# Patient Record
Sex: MALE | Race: WHITE | Employment: FULL TIME | URBAN - METROPOLITAN AREA
[De-identification: names, ages, dates, MRNs, and addresses within clinical notes are randomized per-mention and may not be internally consistent; named-entity substitution may affect disease eponyms.]

---

## 2017-06-26 ENCOUNTER — OFFICE VISIT (OUTPATIENT)
Dept: INTERNAL MEDICINE | Age: 37
End: 2017-06-26

## 2017-06-26 VITALS
WEIGHT: 237 LBS | DIASTOLIC BLOOD PRESSURE: 82 MMHG | SYSTOLIC BLOOD PRESSURE: 120 MMHG | RESPIRATION RATE: 16 BRPM | HEART RATE: 88 BPM | BODY MASS INDEX: 30.42 KG/M2 | HEIGHT: 74 IN

## 2017-06-26 DIAGNOSIS — S76.211D GROIN STRAIN, RIGHT, SUBSEQUENT ENCOUNTER: Primary | ICD-10-CM

## 2017-06-26 PROCEDURE — 99213 OFFICE O/P EST LOW 20 MIN: CPT | Performed by: PHYSICIAN ASSISTANT

## 2017-06-26 RX ORDER — CYCLOBENZAPRINE HCL 10 MG
10 TABLET ORAL 3 TIMES DAILY PRN
Qty: 30 TABLET | Refills: 0 | Status: SHIPPED | OUTPATIENT
Start: 2017-06-26 | End: 2017-07-06

## 2017-06-26 RX ORDER — NAPROXEN 500 MG/1
TABLET ORAL
Refills: 0 | COMMUNITY
Start: 2017-06-19 | End: 2018-05-10

## 2017-11-09 ENCOUNTER — HOSPITAL ENCOUNTER (OUTPATIENT)
Age: 37
Setting detail: SPECIMEN
Discharge: HOME | End: 2017-11-09
Payer: COMMERCIAL

## 2017-11-09 ENCOUNTER — OFFICE VISIT (OUTPATIENT)
Dept: INTERNAL MEDICINE | Age: 37
End: 2017-11-09

## 2017-11-09 VITALS
HEIGHT: 74 IN | HEART RATE: 78 BPM | WEIGHT: 223.6 LBS | DIASTOLIC BLOOD PRESSURE: 84 MMHG | BODY MASS INDEX: 28.7 KG/M2 | SYSTOLIC BLOOD PRESSURE: 136 MMHG

## 2017-11-09 DIAGNOSIS — Z00.00 ANNUAL PHYSICAL EXAM: ICD-10-CM

## 2017-11-09 DIAGNOSIS — Z00.00 ANNUAL PHYSICAL EXAM: Primary | ICD-10-CM

## 2017-11-09 DIAGNOSIS — K21.9 GASTROESOPHAGEAL REFLUX DISEASE WITHOUT ESOPHAGITIS: ICD-10-CM

## 2017-11-09 LAB
ALBUMIN SERPL-MCNC: 4.5 G/DL (ref 3.9–4.9)
ALP BLD-CCNC: 89 U/L (ref 35–104)
ALT SERPL-CCNC: 24 U/L (ref 0–41)
ANION GAP SERPL CALCULATED.3IONS-SCNC: 18 MEQ/L (ref 7–13)
ANISOCYTOSIS: ABNORMAL
AST SERPL-CCNC: 34 U/L (ref 0–40)
BASOPHILS ABSOLUTE: 0 K/UL (ref 0–0.2)
BASOPHILS RELATIVE PERCENT: 0.4 %
BILIRUB SERPL-MCNC: 0.4 MG/DL (ref 0–1.2)
BUN BLDV-MCNC: 9 MG/DL (ref 6–20)
BURR CELLS: ABNORMAL
CALCIUM SERPL-MCNC: 10 MG/DL (ref 8.6–10.2)
CHLORIDE BLD-SCNC: 99 MEQ/L (ref 98–107)
CHOLESTEROL, TOTAL: 157 MG/DL (ref 0–199)
CO2: 24 MEQ/L (ref 22–29)
CREAT SERPL-MCNC: 0.6 MG/DL (ref 0.7–1.2)
EOSINOPHILS ABSOLUTE: 0 K/UL (ref 0–0.7)
EOSINOPHILS RELATIVE PERCENT: 0.4 %
GFR AFRICAN AMERICAN: >60
GFR NON-AFRICAN AMERICAN: >60
GLOBULIN: 3.8 G/DL (ref 2.3–3.5)
GLUCOSE BLD-MCNC: 75 MG/DL (ref 74–109)
HCT VFR BLD CALC: 49.8 % (ref 42–52)
HDLC SERPL-MCNC: 59 MG/DL (ref 40–59)
HEMOGLOBIN: 16.1 G/DL (ref 14–18)
HYPOCHROMIA: ABNORMAL
LDL CHOLESTEROL CALCULATED: 86 MG/DL (ref 0–129)
LYMPHOCYTES ABSOLUTE: 1.2 K/UL (ref 1–4.8)
LYMPHOCYTES RELATIVE PERCENT: 11 %
MCH RBC QN AUTO: 31.1 PG (ref 27–31.3)
MCHC RBC AUTO-ENTMCNC: 32.3 % (ref 33–37)
MCV RBC AUTO: 96.2 FL (ref 80–100)
MONOCYTES ABSOLUTE: 2.2 K/UL (ref 0.2–0.8)
MONOCYTES RELATIVE PERCENT: 19.8 %
NEUTROPHILS ABSOLUTE: 7.7 K/UL (ref 1.4–6.5)
NEUTROPHILS RELATIVE PERCENT: 69 %
PDW BLD-RTO: 16.1 % (ref 11.5–14.5)
PLATELET # BLD: 203 K/UL (ref 130–400)
PLATELET SLIDE REVIEW: ADEQUATE
POIKILOCYTES: ABNORMAL
POTASSIUM SERPL-SCNC: 4.7 MEQ/L (ref 3.5–5.1)
RBC # BLD: 5.18 M/UL (ref 4.7–6.1)
SMUDGE CELLS: 6.3
SODIUM BLD-SCNC: 141 MEQ/L (ref 132–144)
TARGET CELLS: ABNORMAL
TOTAL PROTEIN: 8.3 G/DL (ref 6.4–8.1)
TRIGL SERPL-MCNC: 62 MG/DL (ref 0–200)
WBC # BLD: 11.1 K/UL (ref 4.8–10.8)

## 2017-11-09 PROCEDURE — 99395 PREV VISIT EST AGE 18-39: CPT | Performed by: FAMILY MEDICINE

## 2017-11-09 PROCEDURE — 80061 LIPID PANEL: CPT

## 2017-11-09 PROCEDURE — 80053 COMPREHEN METABOLIC PANEL: CPT

## 2017-11-09 PROCEDURE — 85025 COMPLETE CBC W/AUTO DIFF WBC: CPT

## 2017-11-09 RX ORDER — RANITIDINE 150 MG/1
150 TABLET ORAL 2 TIMES DAILY
Qty: 60 TABLET | Refills: 11 | Status: SHIPPED | OUTPATIENT
Start: 2017-11-09 | End: 2019-09-18 | Stop reason: SDUPTHER

## 2017-11-09 ASSESSMENT — ENCOUNTER SYMPTOMS
EYE DISCHARGE: 0
EYE ITCHING: 0
EYE PAIN: 0
APNEA: 0
CHEST TIGHTNESS: 0
CHOKING: 0

## 2017-11-09 ASSESSMENT — PATIENT HEALTH QUESTIONNAIRE - PHQ9
SUM OF ALL RESPONSES TO PHQ QUESTIONS 1-9: 0
1. LITTLE INTEREST OR PLEASURE IN DOING THINGS: 0
SUM OF ALL RESPONSES TO PHQ9 QUESTIONS 1 & 2: 0
2. FEELING DOWN, DEPRESSED OR HOPELESS: 0

## 2017-11-09 NOTE — PROGRESS NOTES
Patient: Uzma Noe    YOB: 1980    Date: 11/9/17    Patient Active Problem List    Diagnosis Date Noted    Hodgkin lymphoma [facility]) 06/19/2015     Overview Note:     13 + years ago     Becki Watts H/O splenectomy 06/19/2015    Regular astigmatism 05/14/2014    Tear film insufficiency 05/14/2014     Past Medical History:   Diagnosis Date    Cancer ([address]     Hodgkins Lymphoma     Past Surgical History:   Procedure Laterality Date    LYMPH NODE BIOPSY      SPLENECTOMY  1988    VASECTOMY       Family History   Problem Relation Age of Onset    Depression Father      suicide    Depression Sister      suicide    Stroke Maternal Grandfather     Cancer Maternal Grandmother      lung    Heart Attack Maternal Grandmother      Social History     Social History    Marital status:      Spouse name: N/A    Number of children: 2    Years of education: N/A     Occupational History    maintenance       Social History Main Topics    Smoking status: Never Smoker    Smokeless tobacco: Former User     Types: Chew    Alcohol use Yes      Comment: 1 case of beer throughout the week     Drug use: No    Sexual activity: Not Currently     Partners: Female     Other Topics Concern    Not on file     Social History Narrative    No narrative on file     Current Outpatient Prescriptions on File Prior to Visit   Medication Sig Dispense Refill    naproxen (NAPROSYN) 500 MG tablet TAKE 1 TABLET BY MOUTH TWICE DAILY AS NEEDED  0    acetaminophen (TYLENOL) 325 MG tablet Take 325 mg by mouth as needed for Pain      ranitidine (ZANTAC) 150 MG tablet Take 1 tablet by mouth 2 times daily 60 tablet 3     No current facility-administered medications on file prior to visit.       No Known Allergies    BP Readings from Last 4 Encounters:   11/09/17 136/84   06/26/17 120/82   11/02/16 122/84   10/25/16 106/80   ]  Wt Readings from Last 4 Encounters:   11/09/17 223 lb 9.6 oz (101.4 kg)   06/26/17 237 lb (107.5 kg) 11/02/16 257 lb 12.8 oz (116.9 kg)   10/25/16 254 lb (115.2 kg)   ]    No components found for: HBA1C)]  No results found for: CHOL  No results found for: HDL  No components found for: LDL  No components found for: TG]    Chief Complaint   Patient presents with    Annual Exam     wellness for insurance        HPI - Annual Physical Exam      History   Sexual Activity    Sexual activity: Not Currently    Partners: Female     History   Alcohol Use    Yes     Comment: 1 case of beer throughout the week      History   Smoking Status    Never Smoker   Smokeless Tobacco    Former User    Types: Chew     History   Drug Use No         Review of Systems   Constitutional: Negative for activity change, appetite change and chills. HENT: Negative for congestion, dental problem and drooling. Eyes: Negative for pain, discharge and itching. Respiratory: Negative for apnea, choking and chest tightness. Physical Exam  Vitals:    11/09/17 1046   BP: 136/84   Pulse: 78   Body mass index is 28.71 kg/m². Physical Exam   Constitutional: He is oriented to person, place, and time. He appears well-developed and well-nourished. No distress. HENT:   Head: Normocephalic and atraumatic. Right Ear: Hearing, tympanic membrane, external ear and ear canal normal.   Left Ear: Hearing, tympanic membrane, external ear and ear canal normal.   Nose: Nose normal.   Mouth/Throat: Oropharynx is clear and moist. No oropharyngeal exudate. Eyes: Conjunctivae and EOM are normal. Right eye exhibits no discharge. Left eye exhibits no discharge. Neck: Normal range of motion. Neck supple. Cardiovascular: Normal rate, regular rhythm and normal heart sounds. No murmur heard. Pulmonary/Chest: Effort normal and breath sounds normal. No respiratory distress. He has no wheezes. He has no rales. Abdominal: Soft. He exhibits no distension. There is no tenderness. There is no rebound and no guarding.    Musculoskeletal: Normal range of motion. Lymphadenopathy:     He has no cervical adenopathy. Neurological: He is alert and oriented to person, place, and time. Skin: Skin is warm. He is not diaphoretic. Psychiatric: He has a normal mood and affect. His behavior is normal. Judgment and thought content normal.       Assessment:  Lamonte Sun was seen today for annual exam.    Diagnoses and all orders for this visit:    Annual physical exam  -     Lipid Panel; Future  -     Comprehensive Metabolic Panel; Future  -     CBC Auto Differential; Future    Gastroesophageal reflux disease without esophagitis  -     ranitidine (ZANTAC) 150 MG tablet;  Take 1 tablet by mouth 2 times daily          Orders Placed This Encounter   Procedures    Lipid Panel     Standing Status:   Future     Standing Expiration Date:   11/9/2018     Order Specific Question:   Is Patient Fasting?/# of Hours     Answer:   yes 8    Comprehensive Metabolic Panel     Standing Status:   Future     Standing Expiration Date:   11/9/2018    CBC Auto Differential     Standing Status:   Future     Standing Expiration Date:   1/9/2018         Return in about 1 year (around 11/9/2018) for Yearly Exam.    Dr. Valdez Kirkpatrick      11/9/17  11:26 AM

## 2017-11-10 DIAGNOSIS — D72.829 LEUKOCYTOSIS, UNSPECIFIED TYPE: Primary | ICD-10-CM

## 2017-12-04 ENCOUNTER — HOSPITAL ENCOUNTER (OUTPATIENT)
Dept: MRI IMAGING | Age: 37
Discharge: HOME | End: 2017-12-04
Payer: COMMERCIAL

## 2017-12-04 DIAGNOSIS — M54.16 LUMBAR RADICULOPATHY: ICD-10-CM

## 2017-12-04 DIAGNOSIS — G89.29 CHRONIC LOW BACK PAIN WITH SCIATICA, SCIATICA LATERALITY UNSPECIFIED, UNSPECIFIED BACK PAIN LATERALITY: ICD-10-CM

## 2017-12-04 DIAGNOSIS — M54.40 CHRONIC LOW BACK PAIN WITH SCIATICA, SCIATICA LATERALITY UNSPECIFIED, UNSPECIFIED BACK PAIN LATERALITY: ICD-10-CM

## 2017-12-04 PROCEDURE — A9579 GAD-BASE MR CONTRAST NOS,1ML: HCPCS | Performed by: INTERNAL MEDICINE

## 2017-12-04 PROCEDURE — 6360000004 HC RX CONTRAST MEDICATION: Performed by: INTERNAL MEDICINE

## 2017-12-04 PROCEDURE — 72158 MRI LUMBAR SPINE W/O & W/DYE: CPT

## 2017-12-04 RX ORDER — SODIUM CHLORIDE 0.9 % (FLUSH) 0.9 %
10 SYRINGE (ML) INJECTION 2 TIMES DAILY
Status: DISCONTINUED | OUTPATIENT
Start: 2017-12-04 | End: 2017-12-07 | Stop reason: HOSPADM

## 2017-12-04 RX ADMIN — GADOTERIDOL 21 ML: 279.3 INJECTION, SOLUTION INTRAVENOUS at 17:28

## 2017-12-18 ENCOUNTER — HOSPITAL ENCOUNTER (OUTPATIENT)
Dept: MRI IMAGING | Age: 37
Discharge: HOME | End: 2017-12-18
Payer: COMMERCIAL

## 2017-12-18 DIAGNOSIS — M89.8X5 PAIN IN RIGHT FEMUR: ICD-10-CM

## 2017-12-18 PROCEDURE — A9579 GAD-BASE MR CONTRAST NOS,1ML: HCPCS | Performed by: INTERNAL MEDICINE

## 2017-12-18 PROCEDURE — 6360000004 HC RX CONTRAST MEDICATION: Performed by: INTERNAL MEDICINE

## 2017-12-18 PROCEDURE — 73720 MRI LWR EXTREMITY W/O&W/DYE: CPT

## 2017-12-18 RX ADMIN — GADOTERIDOL 20 ML: 279.3 INJECTION, SOLUTION INTRAVENOUS at 18:45

## 2017-12-22 PROBLEM — M16.11 PRIMARY OSTEOARTHRITIS OF RIGHT HIP: Status: ACTIVE | Noted: 2017-12-22

## 2018-04-12 ENCOUNTER — HOSPITAL ENCOUNTER (OUTPATIENT)
Dept: GENERAL RADIOLOGY | Age: 38
Discharge: HOME | End: 2018-04-14
Payer: COMMERCIAL

## 2018-04-12 VITALS — SYSTOLIC BLOOD PRESSURE: 109 MMHG | HEART RATE: 59 BPM | DIASTOLIC BLOOD PRESSURE: 73 MMHG

## 2018-04-12 DIAGNOSIS — M16.11 OSTEOARTHRITIS OF RIGHT HIP, UNSPECIFIED OSTEOARTHRITIS TYPE: ICD-10-CM

## 2018-04-12 PROCEDURE — 6360000002 HC RX W HCPCS: Performed by: ORTHOPAEDIC SURGERY

## 2018-04-12 PROCEDURE — 6360000004 HC RX CONTRAST MEDICATION: Performed by: ORTHOPAEDIC SURGERY

## 2018-04-12 PROCEDURE — 20610 DRAIN/INJ JOINT/BURSA W/O US: CPT

## 2018-04-12 PROCEDURE — 2500000003 HC RX 250 WO HCPCS: Performed by: ORTHOPAEDIC SURGERY

## 2018-04-12 RX ORDER — BUPIVACAINE HYDROCHLORIDE 5 MG/ML
30 INJECTION, SOLUTION EPIDURAL; INTRACAUDAL ONCE
Status: COMPLETED | OUTPATIENT
Start: 2018-04-12 | End: 2018-04-12

## 2018-04-12 RX ORDER — LIDOCAINE HYDROCHLORIDE 20 MG/ML
20 INJECTION, SOLUTION INFILTRATION; PERINEURAL ONCE
Status: COMPLETED | OUTPATIENT
Start: 2018-04-12 | End: 2018-04-12

## 2018-04-12 RX ORDER — TRIAMCINOLONE ACETONIDE 40 MG/ML
80 INJECTION, SUSPENSION INTRA-ARTICULAR; INTRAMUSCULAR ONCE
Status: COMPLETED | OUTPATIENT
Start: 2018-04-12 | End: 2018-04-12

## 2018-04-12 RX ADMIN — LIDOCAINE HYDROCHLORIDE 13 ML: 20 INJECTION, SOLUTION INFILTRATION; PERINEURAL at 09:10

## 2018-04-12 RX ADMIN — IOVERSOL 1 ML: 678 INJECTION INTRA-ARTERIAL; INTRAVENOUS at 09:10

## 2018-04-12 RX ADMIN — BUPIVACAINE HYDROCHLORIDE 3 MG: 5 INJECTION, SOLUTION EPIDURAL; INTRACAUDAL at 09:10

## 2018-04-12 RX ADMIN — TRIAMCINOLONE ACETONIDE 80 MG: 40 INJECTION, SUSPENSION INTRA-ARTICULAR; INTRAMUSCULAR at 09:10

## 2018-05-04 ENCOUNTER — OFFICE VISIT (OUTPATIENT)
Dept: INTERNAL MEDICINE | Age: 38
End: 2018-05-04
Payer: COMMERCIAL

## 2018-05-04 VITALS
DIASTOLIC BLOOD PRESSURE: 78 MMHG | OXYGEN SATURATION: 98 % | WEIGHT: 214 LBS | SYSTOLIC BLOOD PRESSURE: 104 MMHG | HEART RATE: 86 BPM | BODY MASS INDEX: 27.48 KG/M2

## 2018-05-04 DIAGNOSIS — L30.9 DERMATITIS: Primary | ICD-10-CM

## 2018-05-04 PROCEDURE — 99212 OFFICE O/P EST SF 10 MIN: CPT | Performed by: PHYSICIAN ASSISTANT

## 2018-05-04 RX ORDER — GABAPENTIN 300 MG/1
CAPSULE ORAL
Refills: 3 | COMMUNITY
Start: 2018-04-25 | End: 2019-09-18

## 2018-05-04 RX ORDER — METHYLPREDNISOLONE 4 MG/1
TABLET ORAL
Qty: 1 KIT | Refills: 0 | Status: SHIPPED | OUTPATIENT
Start: 2018-05-04 | End: 2018-05-10

## 2018-05-04 RX ORDER — DIPHENHYDRAMINE HCL 50 MG
50 CAPSULE ORAL EVERY 6 HOURS PRN
Qty: 30 CAPSULE | Refills: 0 | Status: SHIPPED | OUTPATIENT
Start: 2018-05-04 | End: 2018-05-14

## 2018-05-04 RX ORDER — TRAMADOL HYDROCHLORIDE 50 MG/1
TABLET ORAL
Refills: 0 | COMMUNITY
Start: 2018-05-02 | End: 2018-05-10

## 2018-05-04 RX ORDER — IBUPROFEN 800 MG/1
TABLET ORAL
Refills: 0 | Status: ON HOLD | COMMUNITY
Start: 2018-04-20 | End: 2018-05-31 | Stop reason: HOSPADM

## 2018-05-09 ASSESSMENT — ENCOUNTER SYMPTOMS
SHORTNESS OF BREATH: 0
SORE THROAT: 0
COUGH: 0

## 2018-05-10 ENCOUNTER — HOSPITAL ENCOUNTER (OUTPATIENT)
Dept: LAB | Age: 38
Discharge: HOME | End: 2018-05-10
Payer: COMMERCIAL

## 2018-05-10 ENCOUNTER — OFFICE VISIT (OUTPATIENT)
Dept: INTERNAL MEDICINE | Age: 38
End: 2018-05-10
Payer: COMMERCIAL

## 2018-05-10 VITALS
HEART RATE: 82 BPM | BODY MASS INDEX: 27.8 KG/M2 | WEIGHT: 216.6 LBS | DIASTOLIC BLOOD PRESSURE: 82 MMHG | HEIGHT: 74 IN | SYSTOLIC BLOOD PRESSURE: 138 MMHG

## 2018-05-10 DIAGNOSIS — T50.905A: Primary | ICD-10-CM

## 2018-05-10 DIAGNOSIS — D69.0: ICD-10-CM

## 2018-05-10 DIAGNOSIS — T50.905A: ICD-10-CM

## 2018-05-10 DIAGNOSIS — D69.0: Primary | ICD-10-CM

## 2018-05-10 LAB
ALBUMIN SERPL-MCNC: 3.9 G/DL (ref 3.9–4.9)
ALP BLD-CCNC: 74 U/L (ref 35–104)
ALT SERPL-CCNC: 18 U/L (ref 0–41)
ANION GAP SERPL CALCULATED.3IONS-SCNC: 16 MEQ/L (ref 7–13)
AST SERPL-CCNC: 21 U/L (ref 0–40)
BASOPHILS ABSOLUTE: 0 K/UL (ref 0–0.2)
BASOPHILS RELATIVE PERCENT: 0.2 %
BILIRUB SERPL-MCNC: 0.3 MG/DL (ref 0–1.2)
BUN BLDV-MCNC: 10 MG/DL (ref 6–20)
C-REACTIVE PROTEIN: 94 MG/L (ref 0–5)
CALCIUM SERPL-MCNC: 10.5 MG/DL (ref 8.6–10.2)
CHLORIDE BLD-SCNC: 96 MEQ/L (ref 98–107)
CO2: 26 MEQ/L (ref 22–29)
CREAT SERPL-MCNC: 0.55 MG/DL (ref 0.7–1.2)
DAT POLYSPECIFIC: NORMAL
EOSINOPHILS ABSOLUTE: 0 K/UL (ref 0–0.7)
EOSINOPHILS RELATIVE PERCENT: 0.1 %
GFR AFRICAN AMERICAN: >60
GFR NON-AFRICAN AMERICAN: >60
GLOBULIN: 3.9 G/DL (ref 2.3–3.5)
GLUCOSE BLD-MCNC: 98 MG/DL (ref 74–109)
HCT VFR BLD CALC: 38.5 % (ref 42–52)
HEMOGLOBIN: 12.6 G/DL (ref 14–18)
LACTATE DEHYDROGENASE: 282 U/L (ref 135–225)
LYMPHOCYTES ABSOLUTE: 1.9 K/UL (ref 1–4.8)
LYMPHOCYTES RELATIVE PERCENT: 15.5 %
MCH RBC QN AUTO: 29.3 PG (ref 27–31.3)
MCHC RBC AUTO-ENTMCNC: 32.8 % (ref 33–37)
MCV RBC AUTO: 89.4 FL (ref 80–100)
MONOCYTES ABSOLUTE: 1.3 K/UL (ref 0.2–0.8)
MONOCYTES RELATIVE PERCENT: 10.4 %
NEUTROPHILS ABSOLUTE: 9.2 K/UL (ref 1.4–6.5)
NEUTROPHILS RELATIVE PERCENT: 73.8 %
PDW BLD-RTO: 15.3 % (ref 11.5–14.5)
PLATELET # BLD: 538 K/UL (ref 130–400)
POTASSIUM SERPL-SCNC: 4.6 MEQ/L (ref 3.5–5.1)
RBC # BLD: 4.31 M/UL (ref 4.7–6.1)
SEDIMENTATION RATE, ERYTHROCYTE: 23 MM (ref 0–10)
SODIUM BLD-SCNC: 138 MEQ/L (ref 132–144)
TOTAL PROTEIN: 7.8 G/DL (ref 6.4–8.1)
WBC # BLD: 12.5 K/UL (ref 4.8–10.8)

## 2018-05-10 PROCEDURE — 86140 C-REACTIVE PROTEIN: CPT

## 2018-05-10 PROCEDURE — 83615 LACTATE (LD) (LDH) ENZYME: CPT

## 2018-05-10 PROCEDURE — 99215 OFFICE O/P EST HI 40 MIN: CPT | Performed by: FAMILY MEDICINE

## 2018-05-10 PROCEDURE — 36415 COLL VENOUS BLD VENIPUNCTURE: CPT

## 2018-05-10 PROCEDURE — 86880 COOMBS TEST DIRECT: CPT

## 2018-05-10 PROCEDURE — 85025 COMPLETE CBC W/AUTO DIFF WBC: CPT

## 2018-05-10 PROCEDURE — 80053 COMPREHEN METABOLIC PANEL: CPT

## 2018-05-10 PROCEDURE — 85652 RBC SED RATE AUTOMATED: CPT

## 2018-05-10 RX ORDER — PREDNISONE 20 MG/1
TABLET ORAL
Qty: 30 TABLET | Refills: 0 | Status: ON HOLD | OUTPATIENT
Start: 2018-05-10 | End: 2018-05-31 | Stop reason: ALTCHOICE

## 2018-05-10 RX ORDER — CYCLOBENZAPRINE HCL 10 MG
TABLET ORAL
COMMUNITY
Start: 2018-05-08 | End: 2018-05-15

## 2018-05-10 RX ORDER — RANITIDINE 150 MG/1
150 TABLET ORAL 2 TIMES DAILY
Qty: 60 TABLET | Refills: 0 | Status: SHIPPED | OUTPATIENT
Start: 2018-05-10 | End: 2018-05-15

## 2018-05-10 RX ORDER — PREDNISONE 20 MG/1
TABLET ORAL
Refills: 0 | Status: ON HOLD | COMMUNITY
Start: 2018-05-07 | End: 2018-05-31 | Stop reason: ALTCHOICE

## 2018-05-15 ENCOUNTER — OFFICE VISIT (OUTPATIENT)
Dept: INTERNAL MEDICINE | Age: 38
End: 2018-05-15
Payer: COMMERCIAL

## 2018-05-15 ENCOUNTER — HOSPITAL ENCOUNTER (OUTPATIENT)
Age: 38
Setting detail: SPECIMEN
Discharge: HOME | End: 2018-05-15
Payer: COMMERCIAL

## 2018-05-15 VITALS
SYSTOLIC BLOOD PRESSURE: 110 MMHG | DIASTOLIC BLOOD PRESSURE: 64 MMHG | BODY MASS INDEX: 27.9 KG/M2 | HEART RATE: 80 BPM | WEIGHT: 217.4 LBS | HEIGHT: 74 IN

## 2018-05-15 DIAGNOSIS — Z01.818 PREOPERATIVE CLEARANCE: Primary | ICD-10-CM

## 2018-05-15 DIAGNOSIS — M16.11 PRIMARY OSTEOARTHRITIS OF RIGHT HIP: ICD-10-CM

## 2018-05-15 DIAGNOSIS — T50.905A: ICD-10-CM

## 2018-05-15 DIAGNOSIS — D69.0: ICD-10-CM

## 2018-05-15 LAB
ALBUMIN SERPL-MCNC: 4 G/DL (ref 3.9–4.9)
ALP BLD-CCNC: 69 U/L (ref 35–104)
ALT SERPL-CCNC: 34 U/L (ref 0–41)
ANION GAP SERPL CALCULATED.3IONS-SCNC: 17 MEQ/L (ref 7–13)
AST SERPL-CCNC: 24 U/L (ref 0–40)
BILIRUB SERPL-MCNC: <0.2 MG/DL (ref 0–1.2)
BUN BLDV-MCNC: 18 MG/DL (ref 6–20)
C-REACTIVE PROTEIN: 9 MG/L (ref 0–5)
CALCIUM SERPL-MCNC: 10 MG/DL (ref 8.6–10.2)
CHLORIDE BLD-SCNC: 96 MEQ/L (ref 98–107)
CO2: 23 MEQ/L (ref 22–29)
CREAT SERPL-MCNC: 0.69 MG/DL (ref 0.7–1.2)
GFR AFRICAN AMERICAN: >60
GFR NON-AFRICAN AMERICAN: >60
GLOBULIN: 3.7 G/DL (ref 2.3–3.5)
GLUCOSE BLD-MCNC: 69 MG/DL (ref 74–109)
LACTATE DEHYDROGENASE: 185 U/L (ref 135–225)
POTASSIUM SERPL-SCNC: 5.2 MEQ/L (ref 3.5–5.1)
SEDIMENTATION RATE, ERYTHROCYTE: 13 MM (ref 0–10)
SODIUM BLD-SCNC: 136 MEQ/L (ref 132–144)
TOTAL PROTEIN: 7.7 G/DL (ref 6.4–8.1)
TSH REFLEX: 1.91 UIU/ML (ref 0.27–4.2)

## 2018-05-15 PROCEDURE — 99214 OFFICE O/P EST MOD 30 MIN: CPT | Performed by: FAMILY MEDICINE

## 2018-05-15 PROCEDURE — 86140 C-REACTIVE PROTEIN: CPT

## 2018-05-15 PROCEDURE — 83615 LACTATE (LD) (LDH) ENZYME: CPT

## 2018-05-15 PROCEDURE — 80053 COMPREHEN METABOLIC PANEL: CPT

## 2018-05-15 PROCEDURE — 85652 RBC SED RATE AUTOMATED: CPT

## 2018-05-15 PROCEDURE — 84443 ASSAY THYROID STIM HORMONE: CPT

## 2018-05-15 RX ORDER — TIZANIDINE HYDROCHLORIDE 6 MG/1
6 CAPSULE, GELATIN COATED ORAL 3 TIMES DAILY
Qty: 30 CAPSULE | Refills: 0 | Status: ON HOLD | OUTPATIENT
Start: 2018-05-15 | End: 2018-05-31 | Stop reason: HOSPADM

## 2018-05-15 ASSESSMENT — ENCOUNTER SYMPTOMS: COLOR CHANGE: 1

## 2018-05-22 ENCOUNTER — HOSPITAL ENCOUNTER (OUTPATIENT)
Dept: PREADMISSION TESTING | Age: 38
Discharge: HOME | End: 2018-05-26
Payer: COMMERCIAL

## 2018-05-22 VITALS
HEIGHT: 74 IN | BODY MASS INDEX: 27.98 KG/M2 | SYSTOLIC BLOOD PRESSURE: 120 MMHG | OXYGEN SATURATION: 98 % | TEMPERATURE: 98.6 F | RESPIRATION RATE: 16 BRPM | HEART RATE: 82 BPM | WEIGHT: 218 LBS | DIASTOLIC BLOOD PRESSURE: 64 MMHG

## 2018-05-22 LAB
ABO/RH: NORMAL
ALBUMIN SERPL-MCNC: 4 G/DL (ref 3.9–4.9)
ALP BLD-CCNC: 93 U/L (ref 35–104)
ALT SERPL-CCNC: 17 U/L (ref 0–41)
ANION GAP SERPL CALCULATED.3IONS-SCNC: 16 MEQ/L (ref 7–13)
ANTIBODY SCREEN: NORMAL
AST SERPL-CCNC: 16 U/L (ref 0–40)
BILIRUB SERPL-MCNC: 0.4 MG/DL (ref 0–1.2)
BILIRUBIN URINE: NEGATIVE
BLOOD, URINE: NEGATIVE
BUN BLDV-MCNC: 16 MG/DL (ref 6–20)
C-REACTIVE PROTEIN, HIGH SENSITIVITY: 215.9 MG/L (ref 0–5)
CALCIUM SERPL-MCNC: 10.2 MG/DL (ref 8.6–10.2)
CHLORIDE BLD-SCNC: 96 MEQ/L (ref 98–107)
CLARITY: CLEAR
CO2: 26 MEQ/L (ref 22–29)
COLOR: YELLOW
CREAT SERPL-MCNC: 0.63 MG/DL (ref 0.7–1.2)
GFR AFRICAN AMERICAN: >60
GFR NON-AFRICAN AMERICAN: >60
GLOBULIN: 3.8 G/DL (ref 2.3–3.5)
GLUCOSE BLD-MCNC: 85 MG/DL (ref 74–109)
GLUCOSE URINE: NEGATIVE MG/DL
HCT VFR BLD CALC: 41.8 % (ref 42–52)
HEMOGLOBIN: 13.6 G/DL (ref 14–18)
KETONES, URINE: NEGATIVE MG/DL
LACTATE DEHYDROGENASE: 174 U/L (ref 135–225)
LEUKOCYTE ESTERASE, URINE: NEGATIVE
MCH RBC QN AUTO: 29.3 PG (ref 27–31.3)
MCHC RBC AUTO-ENTMCNC: 32.5 % (ref 33–37)
MCV RBC AUTO: 90.4 FL (ref 80–100)
NITRITE, URINE: NEGATIVE
PDW BLD-RTO: 16 % (ref 11.5–14.5)
PH UA: 6 (ref 5–9)
PLATELET # BLD: 416 K/UL (ref 130–400)
POTASSIUM SERPL-SCNC: 4.7 MEQ/L (ref 3.5–5.1)
PROTEIN UA: NEGATIVE MG/DL
RBC # BLD: 4.63 M/UL (ref 4.7–6.1)
SEDIMENTATION RATE, ERYTHROCYTE: 68 MM (ref 0–10)
SODIUM BLD-SCNC: 138 MEQ/L (ref 132–144)
SPECIFIC GRAVITY UA: 1.01 (ref 1–1.03)
TOTAL PROTEIN: 7.8 G/DL (ref 6.4–8.1)
TSH REFLEX: 3 UIU/ML (ref 0.27–4.2)
URINE REFLEX TO CULTURE: NORMAL
UROBILINOGEN, URINE: 0.2 E.U./DL
WBC # BLD: 14.2 K/UL (ref 4.8–10.8)

## 2018-05-22 PROCEDURE — 86850 RBC ANTIBODY SCREEN: CPT

## 2018-05-22 PROCEDURE — 84443 ASSAY THYROID STIM HORMONE: CPT

## 2018-05-22 PROCEDURE — 81003 URINALYSIS AUTO W/O SCOPE: CPT

## 2018-05-22 PROCEDURE — 86901 BLOOD TYPING SEROLOGIC RH(D): CPT

## 2018-05-22 PROCEDURE — 85652 RBC SED RATE AUTOMATED: CPT

## 2018-05-22 PROCEDURE — 86141 C-REACTIVE PROTEIN HS: CPT

## 2018-05-22 PROCEDURE — 86900 BLOOD TYPING SEROLOGIC ABO: CPT

## 2018-05-22 PROCEDURE — 83615 LACTATE (LD) (LDH) ENZYME: CPT

## 2018-05-22 PROCEDURE — 85027 COMPLETE CBC AUTOMATED: CPT

## 2018-05-22 PROCEDURE — 80053 COMPREHEN METABOLIC PANEL: CPT

## 2018-05-22 RX ORDER — SODIUM CHLORIDE 0.9 % (FLUSH) 0.9 %
10 SYRINGE (ML) INJECTION PRN
Status: CANCELLED | OUTPATIENT
Start: 2018-05-22

## 2018-05-22 RX ORDER — SODIUM CHLORIDE 0.9 % (FLUSH) 0.9 %
10 SYRINGE (ML) INJECTION EVERY 12 HOURS SCHEDULED
Status: CANCELLED | OUTPATIENT
Start: 2018-05-22

## 2018-05-22 RX ORDER — SODIUM CHLORIDE, SODIUM LACTATE, POTASSIUM CHLORIDE, CALCIUM CHLORIDE 600; 310; 30; 20 MG/100ML; MG/100ML; MG/100ML; MG/100ML
INJECTION, SOLUTION INTRAVENOUS CONTINUOUS
Status: CANCELLED | OUTPATIENT
Start: 2018-05-22

## 2018-05-22 RX ORDER — LIDOCAINE HYDROCHLORIDE 10 MG/ML
1 INJECTION, SOLUTION EPIDURAL; INFILTRATION; INTRACAUDAL; PERINEURAL
Status: CANCELLED | OUTPATIENT
Start: 2018-05-22 | End: 2018-05-22

## 2018-05-22 RX ORDER — HYDROCODONE BITARTRATE AND ACETAMINOPHEN 5; 325 MG/1; MG/1
1 TABLET ORAL EVERY 8 HOURS PRN
Status: ON HOLD | COMMUNITY
End: 2018-05-31 | Stop reason: HOSPADM

## 2018-05-31 ENCOUNTER — PREP FOR PROCEDURE (OUTPATIENT)
Dept: ORTHOPEDIC SURGERY | Age: 38
End: 2018-05-31

## 2018-05-31 ENCOUNTER — APPOINTMENT (OUTPATIENT)
Dept: GENERAL RADIOLOGY | Age: 38
DRG: 470 | End: 2018-05-31
Attending: ORTHOPAEDIC SURGERY
Payer: COMMERCIAL

## 2018-05-31 ENCOUNTER — HOSPITAL ENCOUNTER (INPATIENT)
Age: 38
LOS: 1 days | Discharge: HOME HEALTH | DRG: 470 | End: 2018-06-01
Attending: ORTHOPAEDIC SURGERY | Admitting: ORTHOPAEDIC SURGERY
Payer: COMMERCIAL

## 2018-05-31 ENCOUNTER — ANESTHESIA EVENT (OUTPATIENT)
Dept: OPERATING ROOM | Age: 38
DRG: 470 | End: 2018-05-31
Payer: COMMERCIAL

## 2018-05-31 ENCOUNTER — ANESTHESIA (OUTPATIENT)
Dept: OPERATING ROOM | Age: 38
DRG: 470 | End: 2018-05-31
Payer: COMMERCIAL

## 2018-05-31 VITALS
DIASTOLIC BLOOD PRESSURE: 50 MMHG | SYSTOLIC BLOOD PRESSURE: 81 MMHG | OXYGEN SATURATION: 98 % | RESPIRATION RATE: 14 BRPM | TEMPERATURE: 96.3 F

## 2018-05-31 DIAGNOSIS — M16.11 PRIMARY OSTEOARTHRITIS OF RIGHT HIP: ICD-10-CM

## 2018-05-31 DIAGNOSIS — Z96.641 STATUS POST TOTAL REPLACEMENT OF RIGHT HIP: Primary | ICD-10-CM

## 2018-05-31 LAB
APTT: 35.4 SEC (ref 21.6–35.4)
BASOPHILS ABSOLUTE: 0.1 K/UL (ref 0–0.2)
BASOPHILS RELATIVE PERCENT: 0.7 %
EOSINOPHILS ABSOLUTE: 0.1 K/UL (ref 0–0.7)
EOSINOPHILS RELATIVE PERCENT: 0.7 %
HCT VFR BLD CALC: 37.9 % (ref 42–52)
HEMOGLOBIN: 12.2 G/DL (ref 14–18)
INR BLD: 1.1
LYMPHOCYTES ABSOLUTE: 1.1 K/UL (ref 1–4.8)
LYMPHOCYTES RELATIVE PERCENT: 12.1 %
MCH RBC QN AUTO: 28.8 PG (ref 27–31.3)
MCHC RBC AUTO-ENTMCNC: 32.3 % (ref 33–37)
MCV RBC AUTO: 89.3 FL (ref 80–100)
MONOCYTES ABSOLUTE: 1.1 K/UL (ref 0.2–0.8)
MONOCYTES RELATIVE PERCENT: 11.7 %
NEUTROPHILS ABSOLUTE: 6.9 K/UL (ref 1.4–6.5)
NEUTROPHILS RELATIVE PERCENT: 74.8 %
PDW BLD-RTO: 15.6 % (ref 11.5–14.5)
PLATELET # BLD: 369 K/UL (ref 130–400)
PROTHROMBIN TIME: 11.5 SEC (ref 9.6–12.3)
RBC # BLD: 4.24 M/UL (ref 4.7–6.1)
WBC # BLD: 9.2 K/UL (ref 4.8–10.8)

## 2018-05-31 PROCEDURE — 97162 PT EVAL MOD COMPLEX 30 MIN: CPT

## 2018-05-31 PROCEDURE — 2500000003 HC RX 250 WO HCPCS: Performed by: NURSE PRACTITIONER

## 2018-05-31 PROCEDURE — 88305 TISSUE EXAM BY PATHOLOGIST: CPT

## 2018-05-31 PROCEDURE — 97116 GAIT TRAINING THERAPY: CPT

## 2018-05-31 PROCEDURE — 6370000000 HC RX 637 (ALT 250 FOR IP): Performed by: NURSE PRACTITIONER

## 2018-05-31 PROCEDURE — 2580000003 HC RX 258: Performed by: NURSE PRACTITIONER

## 2018-05-31 PROCEDURE — 73501 X-RAY EXAM HIP UNI 1 VIEW: CPT

## 2018-05-31 PROCEDURE — 86923 COMPATIBILITY TEST ELECTRIC: CPT

## 2018-05-31 PROCEDURE — 2580000003 HC RX 258: Performed by: ORTHOPAEDIC SURGERY

## 2018-05-31 PROCEDURE — 1210000000 HC MED SURG R&B

## 2018-05-31 PROCEDURE — 3700000001 HC ADD 15 MINUTES (ANESTHESIA): Performed by: ORTHOPAEDIC SURGERY

## 2018-05-31 PROCEDURE — 2500000003 HC RX 250 WO HCPCS

## 2018-05-31 PROCEDURE — 85610 PROTHROMBIN TIME: CPT

## 2018-05-31 PROCEDURE — C1713 ANCHOR/SCREW BN/BN,TIS/BN: HCPCS | Performed by: ORTHOPAEDIC SURGERY

## 2018-05-31 PROCEDURE — 3600000014 HC SURGERY LEVEL 4 ADDTL 15MIN: Performed by: ORTHOPAEDIC SURGERY

## 2018-05-31 PROCEDURE — 6370000000 HC RX 637 (ALT 250 FOR IP): Performed by: ORTHOPAEDIC SURGERY

## 2018-05-31 PROCEDURE — G8987 SELF CARE CURRENT STATUS: HCPCS

## 2018-05-31 PROCEDURE — 2580000003 HC RX 258

## 2018-05-31 PROCEDURE — 2500000003 HC RX 250 WO HCPCS: Performed by: ORTHOPAEDIC SURGERY

## 2018-05-31 PROCEDURE — 7100000001 HC PACU RECOVERY - ADDTL 15 MIN: Performed by: ORTHOPAEDIC SURGERY

## 2018-05-31 PROCEDURE — 2500000003 HC RX 250 WO HCPCS: Performed by: NURSE ANESTHETIST, CERTIFIED REGISTERED

## 2018-05-31 PROCEDURE — 88311 DECALCIFY TISSUE: CPT

## 2018-05-31 PROCEDURE — 6370000000 HC RX 637 (ALT 250 FOR IP): Performed by: INTERNAL MEDICINE

## 2018-05-31 PROCEDURE — 85025 COMPLETE CBC W/AUTO DIFF WBC: CPT

## 2018-05-31 PROCEDURE — C1773 RET DEV, INSERTABLE: HCPCS | Performed by: ORTHOPAEDIC SURGERY

## 2018-05-31 PROCEDURE — 85730 THROMBOPLASTIN TIME PARTIAL: CPT

## 2018-05-31 PROCEDURE — G8988 SELF CARE GOAL STATUS: HCPCS

## 2018-05-31 PROCEDURE — 0SR90JA REPLACEMENT OF RIGHT HIP JOINT WITH SYNTHETIC SUBSTITUTE, UNCEMENTED, OPEN APPROACH: ICD-10-PCS | Performed by: ORTHOPAEDIC SURGERY

## 2018-05-31 PROCEDURE — 6360000002 HC RX W HCPCS

## 2018-05-31 PROCEDURE — C1776 JOINT DEVICE (IMPLANTABLE): HCPCS | Performed by: ORTHOPAEDIC SURGERY

## 2018-05-31 PROCEDURE — 7100000000 HC PACU RECOVERY - FIRST 15 MIN: Performed by: ORTHOPAEDIC SURGERY

## 2018-05-31 PROCEDURE — 6360000002 HC RX W HCPCS: Performed by: NURSE ANESTHETIST, CERTIFIED REGISTERED

## 2018-05-31 PROCEDURE — 6360000002 HC RX W HCPCS: Performed by: ORTHOPAEDIC SURGERY

## 2018-05-31 PROCEDURE — 3600000004 HC SURGERY LEVEL 4 BASE: Performed by: ORTHOPAEDIC SURGERY

## 2018-05-31 PROCEDURE — 3700000000 HC ANESTHESIA ATTENDED CARE: Performed by: ORTHOPAEDIC SURGERY

## 2018-05-31 PROCEDURE — 97165 OT EVAL LOW COMPLEX 30 MIN: CPT

## 2018-05-31 DEVICE — SHELL ACET SZ F DIA58MM HIP TRIDENT HA CLUS H HMSPHR MOD 2: Type: IMPLANTABLE DEVICE | Site: HIP | Status: FUNCTIONAL

## 2018-05-31 DEVICE — LINER ACET SZ F ID46MM HIP X3 CO CHROM CEMENTLESS MOD 2: Type: IMPLANTABLE DEVICE | Site: HIP | Status: FUNCTIONAL

## 2018-05-31 DEVICE — HEAD FEM DIA28MM +8MM OFFSET HIP CO CHROM POLYETH TAPR LO: Type: IMPLANTABLE DEVICE | Site: HIP | Status: FUNCTIONAL

## 2018-05-31 DEVICE — SCREW BNE L40MM DIA6.5MM STD CANC HIP TI HMSPHR THRD DRVR: Type: IMPLANTABLE DEVICE | Site: HIP | Status: FUNCTIONAL

## 2018-05-31 DEVICE — COMPONENT TOT HIP CAPPED STD: Type: IMPLANTABLE DEVICE | Site: HIP | Status: FUNCTIONAL

## 2018-05-31 DEVICE — INSERT ACET OD52MM ID28MM HIP X3 2 MOBILITY RESTR MOB BEAR: Type: IMPLANTABLE DEVICE | Site: HIP | Status: FUNCTIONAL

## 2018-05-31 DEVICE — STEM FEM SZ 8 L117MM NK L37MM 47MM OFFSET 132DEG HIP TI: Type: IMPLANTABLE DEVICE | Site: HIP | Status: FUNCTIONAL

## 2018-05-31 RX ORDER — MORPHINE SULFATE 4 MG/ML
4 INJECTION, SOLUTION INTRAMUSCULAR; INTRAVENOUS
Status: DISCONTINUED | OUTPATIENT
Start: 2018-05-31 | End: 2018-05-31 | Stop reason: CLARIF

## 2018-05-31 RX ORDER — SODIUM CHLORIDE 0.9 % (FLUSH) 0.9 %
10 SYRINGE (ML) INJECTION EVERY 12 HOURS SCHEDULED
Status: DISCONTINUED | OUTPATIENT
Start: 2018-05-31 | End: 2018-05-31 | Stop reason: HOSPADM

## 2018-05-31 RX ORDER — DEXAMETHASONE SODIUM PHOSPHATE 10 MG/ML
INJECTION INTRAMUSCULAR; INTRAVENOUS PRN
Status: DISCONTINUED | OUTPATIENT
Start: 2018-05-31 | End: 2018-05-31 | Stop reason: SDUPTHER

## 2018-05-31 RX ORDER — KETOROLAC TROMETHAMINE 30 MG/ML
30 INJECTION, SOLUTION INTRAMUSCULAR; INTRAVENOUS EVERY 6 HOURS
Status: CANCELLED | OUTPATIENT
Start: 2018-05-31 | End: 2018-05-31

## 2018-05-31 RX ORDER — DOCUSATE SODIUM 100 MG/1
100 CAPSULE, LIQUID FILLED ORAL 2 TIMES DAILY
Status: CANCELLED | OUTPATIENT
Start: 2018-05-31

## 2018-05-31 RX ORDER — MORPHINE SULFATE 2 MG/ML
2 INJECTION, SOLUTION INTRAMUSCULAR; INTRAVENOUS
Status: DISCONTINUED | OUTPATIENT
Start: 2018-05-31 | End: 2018-05-31 | Stop reason: CLARIF

## 2018-05-31 RX ORDER — CEFAZOLIN SODIUM 1 G/50ML
INJECTION, SOLUTION INTRAVENOUS PRN
Status: DISCONTINUED | OUTPATIENT
Start: 2018-05-31 | End: 2018-05-31 | Stop reason: SDUPTHER

## 2018-05-31 RX ORDER — LIDOCAINE HYDROCHLORIDE 10 MG/ML
INJECTION, SOLUTION EPIDURAL; INFILTRATION; INTRACAUDAL; PERINEURAL
Status: COMPLETED
Start: 2018-05-31 | End: 2018-05-31

## 2018-05-31 RX ORDER — SENNA AND DOCUSATE SODIUM 50; 8.6 MG/1; MG/1
1 TABLET, FILM COATED ORAL DAILY
Status: CANCELLED | OUTPATIENT
Start: 2018-05-31

## 2018-05-31 RX ORDER — BUPIVACAINE HYDROCHLORIDE 5 MG/ML
INJECTION, SOLUTION EPIDURAL; INTRACAUDAL PRN
Status: DISCONTINUED | OUTPATIENT
Start: 2018-05-31 | End: 2018-05-31 | Stop reason: SDUPTHER

## 2018-05-31 RX ORDER — KETOROLAC TROMETHAMINE 30 MG/ML
30 INJECTION, SOLUTION INTRAMUSCULAR; INTRAVENOUS EVERY 6 HOURS
Status: COMPLETED | OUTPATIENT
Start: 2018-05-31 | End: 2018-05-31

## 2018-05-31 RX ORDER — DIAZEPAM 5 MG/1
5 TABLET ORAL EVERY 8 HOURS PRN
Status: DISCONTINUED | OUTPATIENT
Start: 2018-05-31 | End: 2018-06-01 | Stop reason: HOSPADM

## 2018-05-31 RX ORDER — PROPOFOL 10 MG/ML
INJECTION, EMULSION INTRAVENOUS CONTINUOUS PRN
Status: DISCONTINUED | OUTPATIENT
Start: 2018-05-31 | End: 2018-05-31 | Stop reason: SDUPTHER

## 2018-05-31 RX ORDER — DIAZEPAM 5 MG/1
5 TABLET ORAL EVERY 8 HOURS PRN
Qty: 20 TABLET | Refills: 0 | Status: SHIPPED | OUTPATIENT
Start: 2018-05-31 | End: 2018-06-07

## 2018-05-31 RX ORDER — HYDROCODONE BITARTRATE AND ACETAMINOPHEN 5; 325 MG/1; MG/1
1 TABLET ORAL EVERY 4 HOURS PRN
Status: DISCONTINUED | OUTPATIENT
Start: 2018-05-31 | End: 2018-06-01 | Stop reason: HOSPADM

## 2018-05-31 RX ORDER — LIDOCAINE HYDROCHLORIDE 10 MG/ML
1 INJECTION, SOLUTION EPIDURAL; INFILTRATION; INTRACAUDAL; PERINEURAL
Status: COMPLETED | OUTPATIENT
Start: 2018-05-31 | End: 2018-05-31

## 2018-05-31 RX ORDER — ONDANSETRON 2 MG/ML
4 INJECTION INTRAMUSCULAR; INTRAVENOUS
Status: DISCONTINUED | OUTPATIENT
Start: 2018-05-31 | End: 2018-05-31 | Stop reason: HOSPADM

## 2018-05-31 RX ORDER — GABAPENTIN 300 MG/1
300 CAPSULE ORAL 2 TIMES DAILY
Status: DISCONTINUED | OUTPATIENT
Start: 2018-05-31 | End: 2018-06-01 | Stop reason: HOSPADM

## 2018-05-31 RX ORDER — SODIUM CHLORIDE 9 MG/ML
INJECTION, SOLUTION INTRAVENOUS CONTINUOUS
Status: CANCELLED | OUTPATIENT
Start: 2018-05-31

## 2018-05-31 RX ORDER — ASPIRIN 81 MG/1
81 TABLET ORAL 2 TIMES DAILY
Qty: 60 TABLET | Refills: 0 | Status: SHIPPED | OUTPATIENT
Start: 2018-06-01 | End: 2019-09-18 | Stop reason: ALTCHOICE

## 2018-05-31 RX ORDER — ACETAMINOPHEN 325 MG/1
650 TABLET ORAL EVERY 6 HOURS
Status: DISCONTINUED | OUTPATIENT
Start: 2018-05-31 | End: 2018-06-01 | Stop reason: HOSPADM

## 2018-05-31 RX ORDER — SODIUM CHLORIDE 0.9 % (FLUSH) 0.9 %
10 SYRINGE (ML) INJECTION PRN
Status: CANCELLED | OUTPATIENT
Start: 2018-05-31

## 2018-05-31 RX ORDER — SODIUM CHLORIDE 0.9 % (FLUSH) 0.9 %
10 SYRINGE (ML) INJECTION EVERY 12 HOURS SCHEDULED
Status: CANCELLED | OUTPATIENT
Start: 2018-05-31

## 2018-05-31 RX ORDER — ONDANSETRON 2 MG/ML
4 INJECTION INTRAMUSCULAR; INTRAVENOUS EVERY 6 HOURS PRN
Status: DISCONTINUED | OUTPATIENT
Start: 2018-05-31 | End: 2018-06-01 | Stop reason: HOSPADM

## 2018-05-31 RX ORDER — DOCUSATE SODIUM 100 MG/1
100 CAPSULE, LIQUID FILLED ORAL 2 TIMES DAILY
Status: DISCONTINUED | OUTPATIENT
Start: 2018-05-31 | End: 2018-06-01 | Stop reason: HOSPADM

## 2018-05-31 RX ORDER — SODIUM CHLORIDE, SODIUM LACTATE, POTASSIUM CHLORIDE, CALCIUM CHLORIDE 600; 310; 30; 20 MG/100ML; MG/100ML; MG/100ML; MG/100ML
INJECTION, SOLUTION INTRAVENOUS
Status: COMPLETED
Start: 2018-05-31 | End: 2018-05-31

## 2018-05-31 RX ORDER — CYCLOBENZAPRINE HCL 10 MG
10 TABLET ORAL 3 TIMES DAILY PRN
Status: DISCONTINUED | OUTPATIENT
Start: 2018-05-31 | End: 2018-06-01 | Stop reason: HOSPADM

## 2018-05-31 RX ORDER — CELECOXIB 200 MG/1
400 CAPSULE ORAL ONCE
Status: COMPLETED | OUTPATIENT
Start: 2018-05-31 | End: 2018-05-31

## 2018-05-31 RX ORDER — SODIUM CHLORIDE 0.9 % (FLUSH) 0.9 %
10 SYRINGE (ML) INJECTION PRN
Status: DISCONTINUED | OUTPATIENT
Start: 2018-05-31 | End: 2018-06-01 | Stop reason: HOSPADM

## 2018-05-31 RX ORDER — DIPHENHYDRAMINE HYDROCHLORIDE 50 MG/ML
12.5 INJECTION INTRAMUSCULAR; INTRAVENOUS
Status: DISCONTINUED | OUTPATIENT
Start: 2018-05-31 | End: 2018-05-31 | Stop reason: HOSPADM

## 2018-05-31 RX ORDER — ASPIRIN 81 MG/1
81 TABLET ORAL 2 TIMES DAILY
Status: DISCONTINUED | OUTPATIENT
Start: 2018-06-01 | End: 2018-06-01 | Stop reason: HOSPADM

## 2018-05-31 RX ORDER — METOCLOPRAMIDE HYDROCHLORIDE 5 MG/ML
10 INJECTION INTRAMUSCULAR; INTRAVENOUS
Status: DISCONTINUED | OUTPATIENT
Start: 2018-05-31 | End: 2018-05-31 | Stop reason: HOSPADM

## 2018-05-31 RX ORDER — LIDOCAINE HYDROCHLORIDE 10 MG/ML
INJECTION, SOLUTION INFILTRATION; PERINEURAL PRN
Status: DISCONTINUED | OUTPATIENT
Start: 2018-05-31 | End: 2018-05-31 | Stop reason: SDUPTHER

## 2018-05-31 RX ORDER — ONDANSETRON 2 MG/ML
4 INJECTION INTRAMUSCULAR; INTRAVENOUS EVERY 6 HOURS PRN
Status: CANCELLED | OUTPATIENT
Start: 2018-05-31

## 2018-05-31 RX ORDER — OXYCODONE HYDROCHLORIDE 5 MG/1
5 TABLET ORAL EVERY 4 HOURS PRN
Status: DISCONTINUED | OUTPATIENT
Start: 2018-05-31 | End: 2018-05-31

## 2018-05-31 RX ORDER — SODIUM CHLORIDE 0.9 % (FLUSH) 0.9 %
10 SYRINGE (ML) INJECTION PRN
Status: DISCONTINUED | OUTPATIENT
Start: 2018-05-31 | End: 2018-05-31 | Stop reason: HOSPADM

## 2018-05-31 RX ORDER — HYDROCODONE BITARTRATE AND ACETAMINOPHEN 5; 325 MG/1; MG/1
2 TABLET ORAL EVERY 4 HOURS PRN
Status: DISCONTINUED | OUTPATIENT
Start: 2018-05-31 | End: 2018-06-01 | Stop reason: HOSPADM

## 2018-05-31 RX ORDER — MORPHINE SULFATE 2 MG/ML
2 INJECTION, SOLUTION INTRAMUSCULAR; INTRAVENOUS
Status: CANCELLED | OUTPATIENT
Start: 2018-05-31

## 2018-05-31 RX ORDER — OXYCODONE HYDROCHLORIDE 5 MG/1
5 TABLET ORAL EVERY 4 HOURS PRN
Status: CANCELLED | OUTPATIENT
Start: 2018-05-31

## 2018-05-31 RX ORDER — SODIUM CHLORIDE, SODIUM LACTATE, POTASSIUM CHLORIDE, CALCIUM CHLORIDE 600; 310; 30; 20 MG/100ML; MG/100ML; MG/100ML; MG/100ML
INJECTION, SOLUTION INTRAVENOUS CONTINUOUS
Status: DISCONTINUED | OUTPATIENT
Start: 2018-05-31 | End: 2018-05-31

## 2018-05-31 RX ORDER — HYDROCODONE BITARTRATE AND ACETAMINOPHEN 5; 325 MG/1; MG/1
1 TABLET ORAL EVERY 4 HOURS PRN
Status: DISCONTINUED | OUTPATIENT
Start: 2018-05-31 | End: 2018-05-31

## 2018-05-31 RX ORDER — MIDAZOLAM HYDROCHLORIDE 1 MG/ML
INJECTION INTRAMUSCULAR; INTRAVENOUS PRN
Status: DISCONTINUED | OUTPATIENT
Start: 2018-05-31 | End: 2018-05-31 | Stop reason: SDUPTHER

## 2018-05-31 RX ORDER — ACETAMINOPHEN 500 MG
1000 TABLET ORAL ONCE
Status: COMPLETED | OUTPATIENT
Start: 2018-05-31 | End: 2018-05-31

## 2018-05-31 RX ORDER — MORPHINE SULFATE 2 MG/ML
4 INJECTION, SOLUTION INTRAMUSCULAR; INTRAVENOUS
Status: CANCELLED | OUTPATIENT
Start: 2018-05-31

## 2018-05-31 RX ORDER — SODIUM CHLORIDE 0.9 % (FLUSH) 0.9 %
10 SYRINGE (ML) INJECTION EVERY 12 HOURS SCHEDULED
Status: DISCONTINUED | OUTPATIENT
Start: 2018-05-31 | End: 2018-06-01 | Stop reason: HOSPADM

## 2018-05-31 RX ORDER — SENNA AND DOCUSATE SODIUM 50; 8.6 MG/1; MG/1
1 TABLET, FILM COATED ORAL DAILY
Status: DISCONTINUED | OUTPATIENT
Start: 2018-05-31 | End: 2018-06-01 | Stop reason: HOSPADM

## 2018-05-31 RX ORDER — ACETAMINOPHEN 325 MG/1
650 TABLET ORAL EVERY 6 HOURS
Status: CANCELLED | OUTPATIENT
Start: 2018-05-31

## 2018-05-31 RX ORDER — SODIUM CHLORIDE 9 MG/ML
INJECTION, SOLUTION INTRAVENOUS CONTINUOUS
Status: DISCONTINUED | OUTPATIENT
Start: 2018-05-31 | End: 2018-06-01 | Stop reason: HOSPADM

## 2018-05-31 RX ORDER — MEPERIDINE HYDROCHLORIDE 25 MG/ML
12.5 INJECTION INTRAMUSCULAR; INTRAVENOUS; SUBCUTANEOUS EVERY 5 MIN PRN
Status: DISCONTINUED | OUTPATIENT
Start: 2018-05-31 | End: 2018-05-31 | Stop reason: HOSPADM

## 2018-05-31 RX ORDER — FENTANYL CITRATE 50 UG/ML
50 INJECTION, SOLUTION INTRAMUSCULAR; INTRAVENOUS EVERY 10 MIN PRN
Status: DISCONTINUED | OUTPATIENT
Start: 2018-05-31 | End: 2018-05-31 | Stop reason: HOSPADM

## 2018-05-31 RX ORDER — ONDANSETRON 2 MG/ML
INJECTION INTRAMUSCULAR; INTRAVENOUS PRN
Status: DISCONTINUED | OUTPATIENT
Start: 2018-05-31 | End: 2018-05-31 | Stop reason: SDUPTHER

## 2018-05-31 RX ORDER — HYDROCODONE BITARTRATE AND ACETAMINOPHEN 5; 325 MG/1; MG/1
1 TABLET ORAL EVERY 4 HOURS PRN
Qty: 42 TABLET | Refills: 0 | Status: SHIPPED | OUTPATIENT
Start: 2018-05-31 | End: 2018-06-07

## 2018-05-31 RX ADMIN — LIDOCAINE HYDROCHLORIDE 50 MG: 10 INJECTION, SOLUTION INFILTRATION; PERINEURAL at 10:55

## 2018-05-31 RX ADMIN — PHENYLEPHRINE HYDROCHLORIDE 100 MCG: 10 INJECTION INTRAVENOUS at 11:37

## 2018-05-31 RX ADMIN — LIDOCAINE HYDROCHLORIDE 1 ML: 10 INJECTION, SOLUTION EPIDURAL; INFILTRATION; INTRACAUDAL; PERINEURAL at 09:40

## 2018-05-31 RX ADMIN — DIAZEPAM 5 MG: 5 TABLET ORAL at 18:45

## 2018-05-31 RX ADMIN — SODIUM CHLORIDE: 9 INJECTION, SOLUTION INTRAVENOUS at 22:36

## 2018-05-31 RX ADMIN — MIDAZOLAM HYDROCHLORIDE 2 MG: 1 INJECTION, SOLUTION INTRAMUSCULAR; INTRAVENOUS at 10:35

## 2018-05-31 RX ADMIN — TRANEXAMIC ACID 1 G: 100 INJECTION, SOLUTION INTRAVENOUS at 10:40

## 2018-05-31 RX ADMIN — TRANEXAMIC ACID 1000 MG: 100 INJECTION, SOLUTION INTRAVENOUS at 12:43

## 2018-05-31 RX ADMIN — DEXAMETHASONE SODIUM PHOSPHATE 4 MG: 10 INJECTION INTRAMUSCULAR; INTRAVENOUS at 11:00

## 2018-05-31 RX ADMIN — SODIUM CHLORIDE, POTASSIUM CHLORIDE, SODIUM LACTATE AND CALCIUM CHLORIDE 1000 ML: 600; 310; 30; 20 INJECTION, SOLUTION INTRAVENOUS at 09:40

## 2018-05-31 RX ADMIN — HYDROCODONE BITARTRATE AND ACETAMINOPHEN 1 TABLET: 5; 325 TABLET ORAL at 16:20

## 2018-05-31 RX ADMIN — ONDANSETRON 4 MG: 2 INJECTION INTRAMUSCULAR; INTRAVENOUS at 11:00

## 2018-05-31 RX ADMIN — ACETAMINOPHEN 1000 MG: 500 TABLET ORAL at 09:25

## 2018-05-31 RX ADMIN — CEFAZOLIN SODIUM 2 G: 1 INJECTION, SOLUTION INTRAVENOUS at 10:38

## 2018-05-31 RX ADMIN — CELECOXIB 400 MG: 200 CAPSULE ORAL at 09:24

## 2018-05-31 RX ADMIN — SODIUM CHLORIDE, POTASSIUM CHLORIDE, SODIUM LACTATE AND CALCIUM CHLORIDE: 600; 310; 30; 20 INJECTION, SOLUTION INTRAVENOUS at 11:35

## 2018-05-31 RX ADMIN — Medication 2 G: at 18:45

## 2018-05-31 RX ADMIN — HYDROCODONE BITARTRATE AND ACETAMINOPHEN 1 TABLET: 5; 325 TABLET ORAL at 20:54

## 2018-05-31 RX ADMIN — SODIUM CHLORIDE, POTASSIUM CHLORIDE, SODIUM LACTATE AND CALCIUM CHLORIDE: 600; 310; 30; 20 INJECTION, SOLUTION INTRAVENOUS at 13:52

## 2018-05-31 RX ADMIN — ACETAMINOPHEN 650 MG: 325 TABLET ORAL at 16:20

## 2018-05-31 RX ADMIN — KETOROLAC TROMETHAMINE 30 MG: 30 INJECTION, SOLUTION INTRAMUSCULAR at 16:21

## 2018-05-31 RX ADMIN — SODIUM CHLORIDE: 9 INJECTION, SOLUTION INTRAVENOUS at 16:20

## 2018-05-31 RX ADMIN — SENNOSIDES AND DOCUSATE SODIUM 1 TABLET: 8.6; 5 TABLET ORAL at 16:20

## 2018-05-31 RX ADMIN — PROPOFOL 100 MCG/KG/MIN: 10 INJECTION, EMULSION INTRAVENOUS at 10:55

## 2018-05-31 RX ADMIN — ACETAMINOPHEN 650 MG: 325 TABLET ORAL at 22:35

## 2018-05-31 RX ADMIN — GABAPENTIN 300 MG: 300 CAPSULE ORAL at 21:01

## 2018-05-31 RX ADMIN — KETOROLAC TROMETHAMINE 30 MG: 30 INJECTION, SOLUTION INTRAMUSCULAR at 22:35

## 2018-05-31 RX ADMIN — SODIUM CHLORIDE, POTASSIUM CHLORIDE, SODIUM LACTATE AND CALCIUM CHLORIDE: 600; 310; 30; 20 INJECTION, SOLUTION INTRAVENOUS at 11:00

## 2018-05-31 RX ADMIN — BUPIVACAINE HYDROCHLORIDE 2 ML: 5 INJECTION, SOLUTION EPIDURAL; INTRACAUDAL; PERINEURAL at 10:52

## 2018-05-31 ASSESSMENT — PULMONARY FUNCTION TESTS
PIF_VALUE: 0
PIF_VALUE: 17
PIF_VALUE: 0
PIF_VALUE: 17
PIF_VALUE: 0
PIF_VALUE: 17
PIF_VALUE: 17
PIF_VALUE: 0
PIF_VALUE: 17
PIF_VALUE: 0
PIF_VALUE: 17
PIF_VALUE: 0
PIF_VALUE: 1
PIF_VALUE: 17
PIF_VALUE: 17
PIF_VALUE: 0
PIF_VALUE: 17
PIF_VALUE: 1
PIF_VALUE: 17
PIF_VALUE: 0
PIF_VALUE: 17
PIF_VALUE: 0
PIF_VALUE: 0
PIF_VALUE: 17
PIF_VALUE: 17
PIF_VALUE: 0
PIF_VALUE: 16
PIF_VALUE: 0
PIF_VALUE: 17
PIF_VALUE: 17
PIF_VALUE: 0
PIF_VALUE: 17
PIF_VALUE: 0
PIF_VALUE: 17
PIF_VALUE: 17
PIF_VALUE: 18
PIF_VALUE: 17
PIF_VALUE: 1
PIF_VALUE: 0
PIF_VALUE: 17
PIF_VALUE: 18
PIF_VALUE: 0
PIF_VALUE: 17
PIF_VALUE: 16
PIF_VALUE: 17
PIF_VALUE: 0
PIF_VALUE: 17
PIF_VALUE: 17

## 2018-05-31 ASSESSMENT — PAIN SCALES - GENERAL
PAINLEVEL_OUTOF10: 0
PAINLEVEL_OUTOF10: 5
PAINLEVEL_OUTOF10: 7
PAINLEVEL_OUTOF10: 2
PAINLEVEL_OUTOF10: 4
PAINLEVEL_OUTOF10: 6
PAINLEVEL_OUTOF10: 6
PAINLEVEL_OUTOF10: 0
PAINLEVEL_OUTOF10: 6

## 2018-05-31 ASSESSMENT — PAIN DESCRIPTION - PAIN TYPE: TYPE: SURGICAL PAIN

## 2018-05-31 ASSESSMENT — PAIN DESCRIPTION - LOCATION: LOCATION: HIP

## 2018-05-31 ASSESSMENT — PAIN - FUNCTIONAL ASSESSMENT: PAIN_FUNCTIONAL_ASSESSMENT: 0-10

## 2018-05-31 ASSESSMENT — PAIN DESCRIPTION - FREQUENCY: FREQUENCY: INTERMITTENT

## 2018-05-31 ASSESSMENT — PAIN DESCRIPTION - DESCRIPTORS: DESCRIPTORS: CONSTANT;THROBBING

## 2018-05-31 ASSESSMENT — PAIN DESCRIPTION - ORIENTATION: ORIENTATION: RIGHT

## 2018-06-01 VITALS
TEMPERATURE: 99.1 F | OXYGEN SATURATION: 96 % | HEART RATE: 102 BPM | DIASTOLIC BLOOD PRESSURE: 60 MMHG | SYSTOLIC BLOOD PRESSURE: 106 MMHG | HEIGHT: 74 IN | BODY MASS INDEX: 27.98 KG/M2 | RESPIRATION RATE: 16 BRPM | WEIGHT: 218 LBS

## 2018-06-01 LAB
ANION GAP SERPL CALCULATED.3IONS-SCNC: 15 MEQ/L (ref 7–13)
BUN BLDV-MCNC: 7 MG/DL (ref 6–20)
CALCIUM SERPL-MCNC: 9 MG/DL (ref 8.6–10.2)
CHLORIDE BLD-SCNC: 96 MEQ/L (ref 98–107)
CO2: 24 MEQ/L (ref 22–29)
CREAT SERPL-MCNC: 0.39 MG/DL (ref 0.7–1.2)
GFR AFRICAN AMERICAN: >60
GFR NON-AFRICAN AMERICAN: >60
GLUCOSE BLD-MCNC: 93 MG/DL (ref 74–109)
HCT VFR BLD CALC: 31.3 % (ref 42–52)
HEMOGLOBIN: 10.5 G/DL (ref 14–18)
MCH RBC QN AUTO: 29.6 PG (ref 27–31.3)
MCHC RBC AUTO-ENTMCNC: 33.5 % (ref 33–37)
MCV RBC AUTO: 88.4 FL (ref 80–100)
PDW BLD-RTO: 15.3 % (ref 11.5–14.5)
PLATELET # BLD: 358 K/UL (ref 130–400)
POTASSIUM REFLEX MAGNESIUM: 3.8 MEQ/L (ref 3.5–5.1)
RBC # BLD: 3.54 M/UL (ref 4.7–6.1)
SODIUM BLD-SCNC: 135 MEQ/L (ref 132–144)
WBC # BLD: 10 K/UL (ref 4.8–10.8)

## 2018-06-01 PROCEDURE — 6370000000 HC RX 637 (ALT 250 FOR IP): Performed by: NURSE PRACTITIONER

## 2018-06-01 PROCEDURE — 6360000002 HC RX W HCPCS: Performed by: ORTHOPAEDIC SURGERY

## 2018-06-01 PROCEDURE — 36415 COLL VENOUS BLD VENIPUNCTURE: CPT

## 2018-06-01 PROCEDURE — 80048 BASIC METABOLIC PNL TOTAL CA: CPT

## 2018-06-01 PROCEDURE — 6370000000 HC RX 637 (ALT 250 FOR IP): Performed by: ORTHOPAEDIC SURGERY

## 2018-06-01 PROCEDURE — 97110 THERAPEUTIC EXERCISES: CPT

## 2018-06-01 PROCEDURE — 6370000000 HC RX 637 (ALT 250 FOR IP): Performed by: INTERNAL MEDICINE

## 2018-06-01 PROCEDURE — 2580000003 HC RX 258: Performed by: ORTHOPAEDIC SURGERY

## 2018-06-01 PROCEDURE — 97116 GAIT TRAINING THERAPY: CPT

## 2018-06-01 PROCEDURE — 97535 SELF CARE MNGMENT TRAINING: CPT

## 2018-06-01 PROCEDURE — 85027 COMPLETE CBC AUTOMATED: CPT

## 2018-06-01 RX ADMIN — GABAPENTIN 300 MG: 300 CAPSULE ORAL at 08:01

## 2018-06-01 RX ADMIN — SODIUM CHLORIDE, PRESERVATIVE FREE 10 ML: 5 INJECTION INTRAVENOUS at 08:03

## 2018-06-01 RX ADMIN — Medication 2 G: at 02:36

## 2018-06-01 RX ADMIN — DIAZEPAM 5 MG: 5 TABLET ORAL at 03:48

## 2018-06-01 RX ADMIN — CYCLOBENZAPRINE HYDROCHLORIDE 10 MG: 10 TABLET, FILM COATED ORAL at 08:01

## 2018-06-01 RX ADMIN — ACETAMINOPHEN 650 MG: 325 TABLET ORAL at 11:16

## 2018-06-01 RX ADMIN — DOCUSATE SODIUM 100 MG: 100 CAPSULE, LIQUID FILLED ORAL at 08:01

## 2018-06-01 RX ADMIN — HYDROCODONE BITARTRATE AND ACETAMINOPHEN 2 TABLET: 5; 325 TABLET ORAL at 08:57

## 2018-06-01 RX ADMIN — HYDROCODONE BITARTRATE AND ACETAMINOPHEN 2 TABLET: 5; 325 TABLET ORAL at 17:35

## 2018-06-01 RX ADMIN — HYDROCODONE BITARTRATE AND ACETAMINOPHEN 2 TABLET: 5; 325 TABLET ORAL at 02:36

## 2018-06-01 RX ADMIN — ASPIRIN 81 MG: 81 TABLET, COATED ORAL at 08:01

## 2018-06-01 RX ADMIN — HYDROCODONE BITARTRATE AND ACETAMINOPHEN 2 TABLET: 5; 325 TABLET ORAL at 13:16

## 2018-06-01 ASSESSMENT — PAIN DESCRIPTION - ORIENTATION
ORIENTATION: RIGHT

## 2018-06-01 ASSESSMENT — PAIN DESCRIPTION - PAIN TYPE
TYPE: ACUTE PAIN;SURGICAL PAIN
TYPE: SURGICAL PAIN
TYPE: ACUTE PAIN;SURGICAL PAIN
TYPE: SURGICAL PAIN

## 2018-06-01 ASSESSMENT — PAIN DESCRIPTION - ONSET
ONSET: ON-GOING
ONSET: GRADUAL

## 2018-06-01 ASSESSMENT — PAIN DESCRIPTION - PROGRESSION
CLINICAL_PROGRESSION: NOT CHANGED
CLINICAL_PROGRESSION: GRADUALLY WORSENING
CLINICAL_PROGRESSION: NOT CHANGED

## 2018-06-01 ASSESSMENT — PAIN DESCRIPTION - DESCRIPTORS
DESCRIPTORS: ACHING
DESCRIPTORS: ACHING
DESCRIPTORS: ACHING;BURNING;CONSTANT;TIGHTNESS
DESCRIPTORS: DULL

## 2018-06-01 ASSESSMENT — PAIN DESCRIPTION - LOCATION
LOCATION: HIP;INCISION
LOCATION: HIP
LOCATION: HIP
LOCATION: HIP;INCISION

## 2018-06-01 ASSESSMENT — PAIN SCALES - GENERAL
PAINLEVEL_OUTOF10: 6
PAINLEVEL_OUTOF10: 7
PAINLEVEL_OUTOF10: 4
PAINLEVEL_OUTOF10: 4
PAINLEVEL_OUTOF10: 8
PAINLEVEL_OUTOF10: 7
PAINLEVEL_OUTOF10: 0
PAINLEVEL_OUTOF10: 0
PAINLEVEL_OUTOF10: 6

## 2018-06-01 ASSESSMENT — PAIN DESCRIPTION - FREQUENCY
FREQUENCY: CONTINUOUS
FREQUENCY: INTERMITTENT

## 2018-06-02 LAB
BLOOD BANK DISPENSE STATUS: NORMAL
BLOOD BANK DISPENSE STATUS: NORMAL
BLOOD BANK PRODUCT CODE: NORMAL
BLOOD BANK PRODUCT CODE: NORMAL
BPU ID: NORMAL
BPU ID: NORMAL
DESCRIPTION BLOOD BANK: NORMAL
DESCRIPTION BLOOD BANK: NORMAL

## 2019-09-18 ENCOUNTER — OFFICE VISIT (OUTPATIENT)
Dept: INTERNAL MEDICINE | Age: 39
End: 2019-09-18
Payer: COMMERCIAL

## 2019-09-18 VITALS
OXYGEN SATURATION: 96 % | WEIGHT: 225 LBS | BODY MASS INDEX: 28.88 KG/M2 | DIASTOLIC BLOOD PRESSURE: 72 MMHG | SYSTOLIC BLOOD PRESSURE: 118 MMHG | HEART RATE: 78 BPM | HEIGHT: 74 IN | TEMPERATURE: 98.9 F

## 2019-09-18 DIAGNOSIS — J02.9 SORE THROAT: Primary | ICD-10-CM

## 2019-09-18 DIAGNOSIS — J02.9 SORE THROAT: ICD-10-CM

## 2019-09-18 DIAGNOSIS — K21.9 GASTROESOPHAGEAL REFLUX DISEASE WITHOUT ESOPHAGITIS: ICD-10-CM

## 2019-09-18 DIAGNOSIS — I83.891 VARICOSE VEINS OF RIGHT LOWER EXTREMITY WITH EDEMA: ICD-10-CM

## 2019-09-18 PROCEDURE — 87880 STREP A ASSAY W/OPTIC: CPT | Performed by: FAMILY MEDICINE

## 2019-09-18 PROCEDURE — 99214 OFFICE O/P EST MOD 30 MIN: CPT | Performed by: FAMILY MEDICINE

## 2019-09-18 RX ORDER — RANITIDINE 150 MG/1
150 TABLET ORAL DAILY PRN
Qty: 30 TABLET | Refills: 11 | Status: SHIPPED | OUTPATIENT
Start: 2019-09-18 | End: 2020-02-03 | Stop reason: ALTCHOICE

## 2019-09-18 ASSESSMENT — PATIENT HEALTH QUESTIONNAIRE - PHQ9
2. FEELING DOWN, DEPRESSED OR HOPELESS: 0
SUM OF ALL RESPONSES TO PHQ9 QUESTIONS 1 & 2: 0
SUM OF ALL RESPONSES TO PHQ QUESTIONS 1-9: 0
SUM OF ALL RESPONSES TO PHQ QUESTIONS 1-9: 0
1. LITTLE INTEREST OR PLEASURE IN DOING THINGS: 0

## 2019-09-18 ASSESSMENT — ENCOUNTER SYMPTOMS: SORE THROAT: 1

## 2019-09-18 NOTE — PROGRESS NOTES
cough      Physical Exam  Vitals:    09/18/19 1146   BP: 118/72   Pulse: 78   Temp: 98.9 °F (37.2 °C)   SpO2: 96%   Body mass index is 28.89 kg/m². Physical Exam  Constitutional: appears well-developed and well-nourished. No distress. HENT:   Head: Normocephalic and atraumatic. Right Ear: Tympanic membrane, external ear and ear canal normal.   Left Ear: Tympanic membrane, external ear and ear canal normal.   Nose: Nose normal.   Mouth/Throat: Oropharynx is clear and moist. No oropharyngeal exudate. Eyes: Conjunctivae and EOM are normal. Right eye exhibits no discharge. No scleral icterus. Neck: Normal range of motion. Neck supple. Cardiovascular: Normal rate, regular rhythm and normal heart sounds. Pulmonary/Chest: Effort normal and breath sounds normal. No respiratory distress. no wheezes. no rales. Lymphadenopathy:      no cervical adenopathy. Skin:  not diaphoretic. Nursing note and vitals reviewed. LE: 1+ edema RLE, large varicose veins on RLE  LLE no edema and very mild varicose veins  DP pulses 2+ B/L  No other skin changes noted    Assessment:  Kohi Brooks was seen today for pharyngitis, cough, fever and joint swelling. Diagnoses and all orders for this visit:    Sore throat  -     POCT rapid strep A  -     Throat Culture; Future  Explained viral etiology to patient and advised supportive care, over the counter analgesics for symptomatic therapy, tylenol/motrin for fevers, Fluids, rest  Signs of worsening infection explained, signs of dehydration explained, to seek medical attention if they occur  rtc if not better  If not better at 10-14 day herve advised he/she can call in for antibiotics script  Hand out provided    Gastroesophageal reflux disease without esophagitis  -     ranitidine (ZANTAC) 150 MG tablet;  Take 1 tablet by mouth daily as needed for Heartburn  Stable, controlled  Plan: continue current medications]    Varicose veins of right lower extremity with edema  -     Elastic Bandages & Supports (MEDICAL COMPRESSION STOCKINGS) MISC; 1 each by Does not apply route daily as needed (edema) 15 mmHg  hand out provided, had a lengthy discussion with patient about treatment, it's side effects, the diagnosis & etiology of symptoms, along with management and expectations of outcome with the recommended treatment. Patient verbalized understanding.   Discussed referral to IR after 6 weeks of compression stockings use        Orders Placed This Encounter   Procedures    Throat Culture     Standing Status:   Future     Number of Occurrences:   1     Standing Expiration Date:   9/18/2020    POCT rapid strep Nadeem Andre MD

## 2019-09-19 LAB — S PYO AG THROAT QL: NORMAL

## 2019-09-21 LAB — THROAT CULTURE: NORMAL

## 2020-02-03 ENCOUNTER — HOSPITAL ENCOUNTER (EMERGENCY)
Age: 40
Discharge: ANOTHER HOSPITAL | End: 2020-02-03
Attending: EMERGENCY MEDICINE
Payer: COMMERCIAL

## 2020-02-03 ENCOUNTER — HOSPITAL ENCOUNTER (INPATIENT)
Age: 40
LOS: 7 days | Discharge: HOME HEALTH | DRG: 464 | End: 2020-02-10
Attending: ORTHOPAEDIC SURGERY | Admitting: ORTHOPAEDIC SURGERY
Payer: COMMERCIAL

## 2020-02-03 ENCOUNTER — APPOINTMENT (OUTPATIENT)
Dept: CT IMAGING | Age: 40
End: 2020-02-03
Payer: COMMERCIAL

## 2020-02-03 VITALS
WEIGHT: 230 LBS | RESPIRATION RATE: 18 BRPM | HEIGHT: 74 IN | DIASTOLIC BLOOD PRESSURE: 65 MMHG | BODY MASS INDEX: 29.52 KG/M2 | OXYGEN SATURATION: 98 % | HEART RATE: 79 BPM | SYSTOLIC BLOOD PRESSURE: 137 MMHG | TEMPERATURE: 98.4 F

## 2020-02-03 LAB
ABO/RH: NORMAL
ALBUMIN SERPL-MCNC: 3.9 G/DL (ref 3.5–4.6)
ALP BLD-CCNC: 106 U/L (ref 35–104)
ALT SERPL-CCNC: 12 U/L (ref 0–41)
ANION GAP SERPL CALCULATED.3IONS-SCNC: 13 MEQ/L (ref 9–15)
ANISOCYTOSIS: ABNORMAL
ANTIBODY SCREEN: NORMAL
APTT: 34.8 SEC (ref 24.4–36.8)
AST SERPL-CCNC: 22 U/L (ref 0–40)
ATYPICAL LYMPHOCYTE RELATIVE PERCENT: 2 %
BASOPHILS ABSOLUTE: 0.1 K/UL (ref 0–0.2)
BASOPHILS ABSOLUTE: 0.1 K/UL (ref 0–0.2)
BASOPHILS RELATIVE PERCENT: 0.6 %
BASOPHILS RELATIVE PERCENT: 1 %
BILIRUB SERPL-MCNC: <0.2 MG/DL (ref 0.2–0.7)
BILIRUBIN URINE: NEGATIVE
BLOOD, URINE: NEGATIVE
BUN BLDV-MCNC: 13 MG/DL (ref 6–20)
CALCIUM SERPL-MCNC: 10 MG/DL (ref 8.5–9.9)
CHLORIDE BLD-SCNC: 99 MEQ/L (ref 95–107)
CLARITY: CLEAR
CO2: 28 MEQ/L (ref 20–31)
COLOR: YELLOW
CREAT SERPL-MCNC: <0.17 MG/DL (ref 0.7–1.2)
EOSINOPHILS ABSOLUTE: 0.1 K/UL (ref 0–0.7)
EOSINOPHILS ABSOLUTE: 0.1 K/UL (ref 0–0.7)
EOSINOPHILS RELATIVE PERCENT: 1 %
EOSINOPHILS RELATIVE PERCENT: 1.1 %
GFR AFRICAN AMERICAN: >60
GFR NON-AFRICAN AMERICAN: >60
GLOBULIN: 5 G/DL (ref 2.3–3.5)
GLUCOSE BLD-MCNC: 103 MG/DL (ref 70–99)
GLUCOSE URINE: NEGATIVE MG/DL
HCT VFR BLD CALC: 37.7 % (ref 42–52)
HCT VFR BLD CALC: 42.9 % (ref 42–52)
HEMOGLOBIN: 12.2 G/DL (ref 14–18)
HEMOGLOBIN: 13.6 G/DL (ref 14–18)
INR BLD: 1.1
KETONES, URINE: NEGATIVE MG/DL
LACTIC ACID: 1.2 MMOL/L (ref 0.5–2.2)
LEUKOCYTE ESTERASE, URINE: NEGATIVE
LYMPHOCYTES ABSOLUTE: 1.2 K/UL (ref 1–4.8)
LYMPHOCYTES ABSOLUTE: 2.4 K/UL (ref 1–4.8)
LYMPHOCYTES RELATIVE PERCENT: 12.5 %
LYMPHOCYTES RELATIVE PERCENT: 22 %
MCH RBC QN AUTO: 27.2 PG (ref 27–31.3)
MCH RBC QN AUTO: 27.4 PG (ref 27–31.3)
MCHC RBC AUTO-ENTMCNC: 31.6 % (ref 33–37)
MCHC RBC AUTO-ENTMCNC: 32.3 % (ref 33–37)
MCV RBC AUTO: 84.6 FL (ref 80–100)
MCV RBC AUTO: 86.1 FL (ref 80–100)
MONOCYTES ABSOLUTE: 1.5 K/UL (ref 0.2–0.8)
MONOCYTES ABSOLUTE: 1.6 K/UL (ref 0.2–0.8)
MONOCYTES RELATIVE PERCENT: 15.1 %
MONOCYTES RELATIVE PERCENT: 16.1 %
NEUTROPHILS ABSOLUTE: 5.8 K/UL (ref 1.4–6.5)
NEUTROPHILS ABSOLUTE: 6.8 K/UL (ref 1.4–6.5)
NEUTROPHILS RELATIVE PERCENT: 59 %
NEUTROPHILS RELATIVE PERCENT: 69.7 %
NITRITE, URINE: NEGATIVE
PDW BLD-RTO: 15.1 % (ref 11.5–14.5)
PDW BLD-RTO: 15.3 % (ref 11.5–14.5)
PH UA: 8 (ref 5–9)
PLATELET # BLD: 442 K/UL (ref 130–400)
PLATELET # BLD: 485 K/UL (ref 130–400)
PLATELET SLIDE REVIEW: ABNORMAL
POTASSIUM SERPL-SCNC: 4.6 MEQ/L (ref 3.4–4.9)
PROTEIN UA: NEGATIVE MG/DL
PROTHROMBIN TIME: 15 SEC (ref 12.3–14.9)
RBC # BLD: 4.46 M/UL (ref 4.7–6.1)
RBC # BLD: 4.98 M/UL (ref 4.7–6.1)
SEDIMENTATION RATE, ERYTHROCYTE: 4 MM (ref 0–10)
SODIUM BLD-SCNC: 140 MEQ/L (ref 135–144)
SPECIFIC GRAVITY UA: 1.03 (ref 1–1.03)
TOTAL PROTEIN: 8.9 G/DL (ref 6.3–8)
UROBILINOGEN, URINE: 0.2 E.U./DL
WBC # BLD: 9.8 K/UL (ref 4.8–10.8)
WBC # BLD: 9.9 K/UL (ref 4.8–10.8)

## 2020-02-03 PROCEDURE — 36415 COLL VENOUS BLD VENIPUNCTURE: CPT

## 2020-02-03 PROCEDURE — 83605 ASSAY OF LACTIC ACID: CPT

## 2020-02-03 PROCEDURE — 85610 PROTHROMBIN TIME: CPT

## 2020-02-03 PROCEDURE — 1210000000 HC MED SURG R&B

## 2020-02-03 PROCEDURE — 99284 EMERGENCY DEPT VISIT MOD MDM: CPT

## 2020-02-03 PROCEDURE — 6360000002 HC RX W HCPCS: Performed by: ORTHOPAEDIC SURGERY

## 2020-02-03 PROCEDURE — 85730 THROMBOPLASTIN TIME PARTIAL: CPT

## 2020-02-03 PROCEDURE — 72193 CT PELVIS W/DYE: CPT

## 2020-02-03 PROCEDURE — 81003 URINALYSIS AUTO W/O SCOPE: CPT

## 2020-02-03 PROCEDURE — 6360000004 HC RX CONTRAST MEDICATION: Performed by: EMERGENCY MEDICINE

## 2020-02-03 PROCEDURE — 86850 RBC ANTIBODY SCREEN: CPT

## 2020-02-03 PROCEDURE — 2580000003 HC RX 258: Performed by: ORTHOPAEDIC SURGERY

## 2020-02-03 PROCEDURE — 85652 RBC SED RATE AUTOMATED: CPT

## 2020-02-03 PROCEDURE — 6360000002 HC RX W HCPCS: Performed by: EMERGENCY MEDICINE

## 2020-02-03 PROCEDURE — 96375 TX/PRO/DX INJ NEW DRUG ADDON: CPT

## 2020-02-03 PROCEDURE — 85025 COMPLETE CBC W/AUTO DIFF WBC: CPT

## 2020-02-03 PROCEDURE — 87040 BLOOD CULTURE FOR BACTERIA: CPT

## 2020-02-03 PROCEDURE — 96365 THER/PROPH/DIAG IV INF INIT: CPT

## 2020-02-03 PROCEDURE — 86901 BLOOD TYPING SEROLOGIC RH(D): CPT

## 2020-02-03 PROCEDURE — 80053 COMPREHEN METABOLIC PANEL: CPT

## 2020-02-03 PROCEDURE — 2580000003 HC RX 258: Performed by: EMERGENCY MEDICINE

## 2020-02-03 PROCEDURE — 86900 BLOOD TYPING SEROLOGIC ABO: CPT

## 2020-02-03 RX ORDER — OXYCODONE HYDROCHLORIDE 5 MG/1
5 TABLET ORAL EVERY 4 HOURS PRN
Status: DISCONTINUED | OUTPATIENT
Start: 2020-02-03 | End: 2020-02-10 | Stop reason: HOSPADM

## 2020-02-03 RX ORDER — ACETAMINOPHEN 325 MG/1
650 TABLET ORAL EVERY 4 HOURS PRN
Status: DISCONTINUED | OUTPATIENT
Start: 2020-02-03 | End: 2020-02-10 | Stop reason: HOSPADM

## 2020-02-03 RX ORDER — DIAZEPAM 5 MG/1
5 TABLET ORAL EVERY 6 HOURS PRN
Status: DISCONTINUED | OUTPATIENT
Start: 2020-02-03 | End: 2020-02-10 | Stop reason: HOSPADM

## 2020-02-03 RX ORDER — ONDANSETRON 2 MG/ML
4 INJECTION INTRAMUSCULAR; INTRAVENOUS EVERY 6 HOURS PRN
Status: DISCONTINUED | OUTPATIENT
Start: 2020-02-03 | End: 2020-02-06 | Stop reason: SDUPTHER

## 2020-02-03 RX ORDER — ONDANSETRON 2 MG/ML
4 INJECTION INTRAMUSCULAR; INTRAVENOUS ONCE
Status: COMPLETED | OUTPATIENT
Start: 2020-02-03 | End: 2020-02-03

## 2020-02-03 RX ORDER — MORPHINE SULFATE 4 MG/ML
4 INJECTION, SOLUTION INTRAMUSCULAR; INTRAVENOUS
Status: DISCONTINUED | OUTPATIENT
Start: 2020-02-03 | End: 2020-02-03

## 2020-02-03 RX ORDER — MORPHINE SULFATE 2 MG/ML
2 INJECTION, SOLUTION INTRAMUSCULAR; INTRAVENOUS
Status: DISCONTINUED | OUTPATIENT
Start: 2020-02-03 | End: 2020-02-03

## 2020-02-03 RX ORDER — SODIUM CHLORIDE 9 MG/ML
INJECTION, SOLUTION INTRAVENOUS CONTINUOUS
Status: DISCONTINUED | OUTPATIENT
Start: 2020-02-03 | End: 2020-02-03 | Stop reason: HOSPADM

## 2020-02-03 RX ORDER — MORPHINE SULFATE 4 MG/ML
4 INJECTION, SOLUTION INTRAMUSCULAR; INTRAVENOUS ONCE
Status: COMPLETED | OUTPATIENT
Start: 2020-02-03 | End: 2020-02-03

## 2020-02-03 RX ORDER — SODIUM CHLORIDE 9 MG/ML
INJECTION, SOLUTION INTRAVENOUS CONTINUOUS
Status: DISCONTINUED | OUTPATIENT
Start: 2020-02-03 | End: 2020-02-10 | Stop reason: HOSPADM

## 2020-02-03 RX ADMIN — MORPHINE SULFATE 4 MG: 4 INJECTION, SOLUTION INTRAMUSCULAR; INTRAVENOUS at 12:40

## 2020-02-03 RX ADMIN — SODIUM CHLORIDE: 9 INJECTION, SOLUTION INTRAVENOUS at 12:39

## 2020-02-03 RX ADMIN — SODIUM CHLORIDE: 9 INJECTION, SOLUTION INTRAVENOUS at 21:25

## 2020-02-03 RX ADMIN — MORPHINE SULFATE 2 MG: 2 INJECTION, SOLUTION INTRAMUSCULAR; INTRAVENOUS at 21:25

## 2020-02-03 RX ADMIN — IOPAMIDOL 100 ML: 755 INJECTION, SOLUTION INTRAVENOUS at 14:00

## 2020-02-03 RX ADMIN — VANCOMYCIN HYDROCHLORIDE 1000 MG: 1 INJECTION, POWDER, LYOPHILIZED, FOR SOLUTION INTRAVENOUS at 12:44

## 2020-02-03 RX ADMIN — ONDANSETRON 4 MG: 2 INJECTION INTRAMUSCULAR; INTRAVENOUS at 12:39

## 2020-02-03 ASSESSMENT — ENCOUNTER SYMPTOMS
TROUBLE SWALLOWING: 0
EYE DISCHARGE: 0
COUGH: 0
BLOOD IN STOOL: 0
EYE REDNESS: 0
WHEEZING: 0
SINUS PRESSURE: 0
DIARRHEA: 0
BACK PAIN: 0
STRIDOR: 0
EYE PAIN: 0
CHOKING: 0
CHEST TIGHTNESS: 0
VOMITING: 0
FACIAL SWELLING: 0
ABDOMINAL PAIN: 0
CONSTIPATION: 0
SORE THROAT: 0
VOICE CHANGE: 0
SHORTNESS OF BREATH: 0

## 2020-02-03 ASSESSMENT — PAIN SCALES - GENERAL
PAINLEVEL_OUTOF10: 0
PAINLEVEL_OUTOF10: 2
PAINLEVEL_OUTOF10: 1
PAINLEVEL_OUTOF10: 0
PAINLEVEL_OUTOF10: 6

## 2020-02-03 NOTE — ED NOTES
Called to [facility] Dr. Lottie Fishman is in surgery, paged on call physician, Dr. Kodi Montanez.   Matt Ashraf  02/03/20 [id number]

## 2020-02-03 NOTE — ED PROVIDER NOTES
[facility] ED  eMERGENCY dEPARTMENT eNCOUnter      Pt Name: Ginny Gray  MRN: [id number]  [place] 1980  Date of evaluation: 2/3/2020  Provider: Natasha Carbajal MD    [address] [place] [address]       Chief Complaint   Patient presents with    Hip Pain     had right hip replaced about a year in a half ago, recently became inflammed and red on 01/31/2020       HISTORY OF PRESENT ILLNESS   (Location/Symptom, Timing/Onset,Context/Setting, Quality, Duration, Modifying Factors, Severity)  Note limiting factors. Ginny Gray is a 37 y.o. male who presents to the emergency department patient with a history of Hodgkin lymphoma status post splenectomy replacement of the right hip in 2018 by Dr. Tsering Jacobs patient has no injury no fall has increasing hip pain came here for further evaluation no numbness tingling to the toes or to the leg no fever no chills pain is worse on ambulation, patient problem started to 3 weeks ago as per patient with hip pain and noted to have a abscess which is supposed to be drained by orthopedic patient got increasing pain and swelling so decided to come here to be checked out no drainage no documented fever    HPI    NursingNotes were reviewed. REVIEW OF SYSTEMS    (2-9 systems for level 4, 10 or more for level 5)     Review of Systems   Constitutional: Negative. Negative for activity change and fever. HENT: Negative for congestion, drooling, facial swelling, mouth sores, nosebleeds, sinus pressure, sore throat, trouble swallowing and voice change. Eyes: Negative for pain, discharge, redness and visual disturbance. Respiratory: Negative for cough, choking, chest tightness, shortness of breath, wheezing and stridor. Cardiovascular: Negative for chest pain, palpitations and leg swelling. Gastrointestinal: Negative for abdominal pain, blood in stool, constipation, diarrhea and vomiting. Endocrine: Negative for cold intolerance, polyphagia and polyuria.    Genitourinary: Negative for dysuria, flank pain, frequency, genital sores and urgency. Musculoskeletal: Positive for arthralgias, joint swelling and myalgias. Negative for back pain, neck pain and neck stiffness. Skin: Negative for pallor and rash. Neurological: Negative for tremors, seizures, syncope, weakness, numbness and headaches. Hematological: Negative for adenopathy. Does not bruise/bleed easily. Psychiatric/Behavioral: Negative for agitation, behavioral problems, hallucinations and sleep disturbance. The patient is not hyperactive. All other systems reviewed and are negative. Except as noted above the remainder of the review of systems was reviewed and negative.        PAST MEDICAL HISTORY     Past Medical History:   Diagnosis Date    Cancer [facility]) 1993    Hodgkins Lymphoma         SURGICALHISTORY       Past Surgical History:   Procedure Laterality Date    LYMPH NODE BIOPSY      [place] TOTAL HIP ARTHROPLASTY Right 5/31/2018    RIGHT HIP TOTAL HIP ARTHROPLASTY LITTLE performed by Uri Joe MD at [address]       Previous Medications    No medications on file       ALLERGIES     Tramadol    FAMILY HISTORY       Family History   Problem Relation Age of Onset    Depression Father         suicide    Depression Sister         suicide    Stroke Maternal Grandfather     Cancer Maternal Grandmother         lung    Heart Attack Maternal Grandmother           SOCIAL HISTORY       Social History     Socioeconomic History    Marital status: Single     Spouse name: None    Number of children: 2    Years of education: None    Highest education level: None   Occupational History    Occupation: maintenance    Social Needs    Financial resource strain: None    Food insecurity:     Worry: None     Inability: None    Transportation needs:     Medical: None     Non-medical: None   Tobacco Use    Smoking status: Never Smoker    Smokeless tobacco: Former User     Types: Chew   Substance and Sexual Activity    Alcohol use: Yes     Comment: 1 case of beer throughout the week     Drug use: No    Sexual activity: Not Currently     Partners: Female   Lifestyle    Physical activity:     Days per week: None     Minutes per session: None    Stress: None   Relationships    Social connections:     Talks on phone: None     Gets together: None     Attends Congregation service: None     Active member of club or organization: None     Attends meetings of clubs or organizations: None     Relationship status: None    Intimate partner violence:     Fear of current or ex partner: None     Emotionally abused: None     Physically abused: None     Forced sexual activity: None   Other Topics Concern    None   Social History Narrative    None       SCREENINGS      @FLOW([id number])@      PHYSICAL EXAM    (up to 7 for level 4, 8 or more for level 5)     ED Triage Vitals   BP Temp Temp src Pulse Resp SpO2 Height Weight   -- -- -- -- -- -- -- --       Physical Exam  Vitals signs and nursing note reviewed. Constitutional:       Appearance: Normal appearance. He is well-developed and normal weight. Comments: Active alert cooperative patient nontoxic appearance   HENT:      Head: Normocephalic. Right Ear: Tympanic membrane normal.      Left Ear: Tympanic membrane normal.      Nose: No congestion. Mouth/Throat:      Mouth: Mucous membranes are moist.   Neck:      Musculoskeletal: Normal range of motion and neck supple. Cardiovascular:      Rate and Rhythm: Normal rate. Heart sounds: Normal heart sounds. No murmur. No gallop. Pulmonary:      Effort: No respiratory distress. Breath sounds: Normal breath sounds. No wheezing. Abdominal:      General: Bowel sounds are normal.      Palpations: Abdomen is soft. There is no mass. Tenderness: There is no rebound. Musculoskeletal: Normal range of motion.          General: Swelling and tenderness present. Comments: Patient going to the right hip patient is straight leg raising is negative patient has no sensory deficit large area of fluctuation erythema and warmth of the right hip consistent with a developing abscess   Skin:     General: Skin is warm. Capillary Refill: Capillary refill takes less than 2 seconds. Findings: No erythema or rash. Neurological:      Mental Status: He is alert and oriented to person, place, and time. Cranial Nerves: No cranial nerve deficit. Motor: No abnormal muscle tone. Psychiatric:         Behavior: Behavior normal.         Thought Content: Thought content normal.         DIAGNOSTIC RESULTS     EKG: All EKG's are interpreted by the Emergency Department Physician who either signs or Co-signsthis chart in the absence of a cardiologist.        RADIOLOGY:   Teola Romberg such as CT, Ultrasound and MRI are read by the radiologist. Plain radiographic images are visualized and preliminarily interpreted by the emergency physician with the below findings:      Interpretation per the Radiologist below, if available at the time ofthis note:    CT PELVIS W CONTRAST Additional Contrast? None   Final Result   1. Findings highly suspicious for large multiloculated abscess, measuring approximately 7.2 x 5.8 x 11.0 cm extending in the tissues of the right hemipelvis and hip likely involving the gluteus rylee muscle and musculotendinous junction with extension    into the subcutaneous tissues as well. 2. Moderate left hip osteoarthritis.             ED BEDSIDE ULTRASOUND:   Performed by ED Physician - none    LABS:  Labs Reviewed   COMPREHENSIVE METABOLIC PANEL - Abnormal; Notable for the following components:       Result Value    Glucose 103 (*)     CREATININE <0.17 (*)     Calcium 10.0 (*)     Total Protein 8.9 (*)     Alkaline Phosphatase 106 (*)     Globulin 5.0 (*)     All other components within normal limits   CBC WITH AUTO DIFFERENTIAL - Abnormal;

## 2020-02-03 NOTE — PROGRESS NOTES
Pt admission and assessment complete. Pt states no pain at the abcess site. Call placed to Dr Jhonathan Meza to let him know pt has arrived to floor.

## 2020-02-04 ENCOUNTER — ANESTHESIA (OUTPATIENT)
Dept: OPERATING ROOM | Age: 40
DRG: 464 | End: 2020-02-04
Payer: COMMERCIAL

## 2020-02-04 ENCOUNTER — ANESTHESIA EVENT (OUTPATIENT)
Dept: OPERATING ROOM | Age: 40
DRG: 464 | End: 2020-02-04
Payer: COMMERCIAL

## 2020-02-04 VITALS — DIASTOLIC BLOOD PRESSURE: 51 MMHG | OXYGEN SATURATION: 100 % | TEMPERATURE: 96.3 F | SYSTOLIC BLOOD PRESSURE: 94 MMHG

## 2020-02-04 PROBLEM — M25.551 HIP PAIN, ACUTE, RIGHT: Status: ACTIVE | Noted: 2020-02-04

## 2020-02-04 LAB
ANION GAP SERPL CALCULATED.3IONS-SCNC: 13 MEQ/L (ref 9–15)
ANION GAP SERPL CALCULATED.3IONS-SCNC: 14 MEQ/L (ref 9–15)
BASOPHILS ABSOLUTE: 0.1 K/UL (ref 0–0.2)
BASOPHILS RELATIVE PERCENT: 0.7 %
BUN BLDV-MCNC: 7 MG/DL (ref 6–20)
BUN BLDV-MCNC: 8 MG/DL (ref 6–20)
CALCIUM SERPL-MCNC: 9.2 MG/DL (ref 8.5–9.9)
CALCIUM SERPL-MCNC: 9.2 MG/DL (ref 8.5–9.9)
CHLORIDE BLD-SCNC: 100 MEQ/L (ref 95–107)
CHLORIDE BLD-SCNC: 102 MEQ/L (ref 95–107)
CO2: 25 MEQ/L (ref 20–31)
CO2: 25 MEQ/L (ref 20–31)
CREAT SERPL-MCNC: 0.62 MG/DL (ref 0.7–1.2)
CREAT SERPL-MCNC: 0.63 MG/DL (ref 0.7–1.2)
EKG ATRIAL RATE: 58 BPM
EKG P AXIS: 44 DEGREES
EKG P-R INTERVAL: 146 MS
EKG Q-T INTERVAL: 408 MS
EKG QRS DURATION: 94 MS
EKG QTC CALCULATION (BAZETT): 400 MS
EKG R AXIS: 26 DEGREES
EKG T AXIS: 6 DEGREES
EKG VENTRICULAR RATE: 58 BPM
EOSINOPHILS ABSOLUTE: 0.3 K/UL (ref 0–0.7)
EOSINOPHILS RELATIVE PERCENT: 2.9 %
GFR AFRICAN AMERICAN: >60
GFR AFRICAN AMERICAN: >60
GFR NON-AFRICAN AMERICAN: >60
GFR NON-AFRICAN AMERICAN: >60
GLUCOSE BLD-MCNC: 111 MG/DL (ref 70–99)
GLUCOSE BLD-MCNC: 96 MG/DL (ref 70–99)
HCT VFR BLD CALC: 38.9 % (ref 42–52)
HEMOGLOBIN: 12.9 G/DL (ref 14–18)
LYMPHOCYTES ABSOLUTE: 1.7 K/UL (ref 1–4.8)
LYMPHOCYTES RELATIVE PERCENT: 16.9 %
MCH RBC QN AUTO: 28 PG (ref 27–31.3)
MCHC RBC AUTO-ENTMCNC: 33.1 % (ref 33–37)
MCV RBC AUTO: 84.8 FL (ref 80–100)
MONOCYTES ABSOLUTE: 1.5 K/UL (ref 0.2–0.8)
MONOCYTES RELATIVE PERCENT: 15.5 %
NEUTROPHILS ABSOLUTE: 6.3 K/UL (ref 1.4–6.5)
NEUTROPHILS RELATIVE PERCENT: 64 %
PDW BLD-RTO: 15.4 % (ref 11.5–14.5)
PLATELET # BLD: 488 K/UL (ref 130–400)
POTASSIUM REFLEX MAGNESIUM: 4.5 MEQ/L (ref 3.4–4.9)
POTASSIUM SERPL-SCNC: 4.2 MEQ/L (ref 3.4–4.9)
PROCALCITONIN: 0.06 NG/ML (ref 0–0.15)
RBC # BLD: 4.59 M/UL (ref 4.7–6.1)
SODIUM BLD-SCNC: 139 MEQ/L (ref 135–144)
SODIUM BLD-SCNC: 140 MEQ/L (ref 135–144)
WBC # BLD: 9.8 K/UL (ref 4.8–10.8)

## 2020-02-04 PROCEDURE — 6370000000 HC RX 637 (ALT 250 FOR IP): Performed by: ORTHOPAEDIC SURGERY

## 2020-02-04 PROCEDURE — 3600000014 HC SURGERY LEVEL 4 ADDTL 15MIN: Performed by: ORTHOPAEDIC SURGERY

## 2020-02-04 PROCEDURE — 2580000003 HC RX 258: Performed by: ORTHOPAEDIC SURGERY

## 2020-02-04 PROCEDURE — 87147 CULTURE TYPE IMMUNOLOGIC: CPT

## 2020-02-04 PROCEDURE — 87070 CULTURE OTHR SPECIMN AEROBIC: CPT

## 2020-02-04 PROCEDURE — 6360000002 HC RX W HCPCS: Performed by: INTERNAL MEDICINE

## 2020-02-04 PROCEDURE — 2500000003 HC RX 250 WO HCPCS: Performed by: ANESTHESIOLOGY

## 2020-02-04 PROCEDURE — 3700000000 HC ANESTHESIA ATTENDED CARE: Performed by: ORTHOPAEDIC SURGERY

## 2020-02-04 PROCEDURE — 6360000002 HC RX W HCPCS: Performed by: NURSE ANESTHETIST, CERTIFIED REGISTERED

## 2020-02-04 PROCEDURE — 1210000000 HC MED SURG R&B

## 2020-02-04 PROCEDURE — 87186 SC STD MICRODIL/AGAR DIL: CPT

## 2020-02-04 PROCEDURE — 84145 PROCALCITONIN (PCT): CPT

## 2020-02-04 PROCEDURE — 93005 ELECTROCARDIOGRAM TRACING: CPT

## 2020-02-04 PROCEDURE — 87205 SMEAR GRAM STAIN: CPT

## 2020-02-04 PROCEDURE — 36415 COLL VENOUS BLD VENIPUNCTURE: CPT

## 2020-02-04 PROCEDURE — 6360000002 HC RX W HCPCS: Performed by: ORTHOPAEDIC SURGERY

## 2020-02-04 PROCEDURE — 6360000002 HC RX W HCPCS: Performed by: ANESTHESIOLOGY

## 2020-02-04 PROCEDURE — 85025 COMPLETE CBC W/AUTO DIFF WBC: CPT

## 2020-02-04 PROCEDURE — 2580000003 HC RX 258: Performed by: NURSE ANESTHETIST, CERTIFIED REGISTERED

## 2020-02-04 PROCEDURE — 3700000001 HC ADD 15 MINUTES (ANESTHESIA): Performed by: ORTHOPAEDIC SURGERY

## 2020-02-04 PROCEDURE — 3600000004 HC SURGERY LEVEL 4 BASE: Performed by: ORTHOPAEDIC SURGERY

## 2020-02-04 PROCEDURE — 87075 CULTR BACTERIA EXCEPT BLOOD: CPT

## 2020-02-04 PROCEDURE — 7100000001 HC PACU RECOVERY - ADDTL 15 MIN: Performed by: ORTHOPAEDIC SURGERY

## 2020-02-04 PROCEDURE — 2580000003 HC RX 258: Performed by: INTERNAL MEDICINE

## 2020-02-04 PROCEDURE — 0JBL0ZZ EXCISION OF RIGHT UPPER LEG SUBCUTANEOUS TISSUE AND FASCIA, OPEN APPROACH: ICD-10-PCS | Performed by: ORTHOPAEDIC SURGERY

## 2020-02-04 PROCEDURE — 80048 BASIC METABOLIC PNL TOTAL CA: CPT

## 2020-02-04 PROCEDURE — 76942 ECHO GUIDE FOR BIOPSY: CPT | Performed by: ANESTHESIOLOGY

## 2020-02-04 PROCEDURE — 94150 VITAL CAPACITY TEST: CPT

## 2020-02-04 PROCEDURE — 7100000000 HC PACU RECOVERY - FIRST 15 MIN: Performed by: ORTHOPAEDIC SURGERY

## 2020-02-04 PROCEDURE — 87077 CULTURE AEROBIC IDENTIFY: CPT

## 2020-02-04 PROCEDURE — 2709999900 HC NON-CHARGEABLE SUPPLY: Performed by: ORTHOPAEDIC SURGERY

## 2020-02-04 PROCEDURE — 6370000000 HC RX 637 (ALT 250 FOR IP): Performed by: NURSE PRACTITIONER

## 2020-02-04 RX ORDER — DEXAMETHASONE SODIUM PHOSPHATE 4 MG/ML
INJECTION, SOLUTION INTRA-ARTICULAR; INTRALESIONAL; INTRAMUSCULAR; INTRAVENOUS; SOFT TISSUE PRN
Status: DISCONTINUED | OUTPATIENT
Start: 2020-02-04 | End: 2020-02-04 | Stop reason: SDUPTHER

## 2020-02-04 RX ORDER — ONDANSETRON 2 MG/ML
4 INJECTION INTRAMUSCULAR; INTRAVENOUS
Status: DISCONTINUED | OUTPATIENT
Start: 2020-02-04 | End: 2020-02-04 | Stop reason: HOSPADM

## 2020-02-04 RX ORDER — CEFAZOLIN SODIUM 2 G/50ML
2 SOLUTION INTRAVENOUS
Status: DISCONTINUED | OUTPATIENT
Start: 2020-02-04 | End: 2020-02-04 | Stop reason: HOSPADM

## 2020-02-04 RX ORDER — CEFAZOLIN SODIUM 2 G/50ML
2 SOLUTION INTRAVENOUS
Status: COMPLETED | OUTPATIENT
Start: 2020-02-04 | End: 2020-02-04

## 2020-02-04 RX ORDER — MORPHINE SULFATE 4 MG/ML
4 INJECTION, SOLUTION INTRAMUSCULAR; INTRAVENOUS
Status: DISCONTINUED | OUTPATIENT
Start: 2020-02-04 | End: 2020-02-07

## 2020-02-04 RX ORDER — SODIUM CHLORIDE 0.9 % (FLUSH) 0.9 %
10 SYRINGE (ML) INJECTION EVERY 12 HOURS SCHEDULED
Status: DISCONTINUED | OUTPATIENT
Start: 2020-02-04 | End: 2020-02-10 | Stop reason: HOSPADM

## 2020-02-04 RX ORDER — PROPOFOL 10 MG/ML
INJECTION, EMULSION INTRAVENOUS PRN
Status: DISCONTINUED | OUTPATIENT
Start: 2020-02-04 | End: 2020-02-04 | Stop reason: SDUPTHER

## 2020-02-04 RX ORDER — LIDOCAINE HYDROCHLORIDE 20 MG/ML
INJECTION, SOLUTION EPIDURAL; INFILTRATION; INTRACAUDAL; PERINEURAL PRN
Status: DISCONTINUED | OUTPATIENT
Start: 2020-02-04 | End: 2020-02-04 | Stop reason: SDUPTHER

## 2020-02-04 RX ORDER — SODIUM CHLORIDE, SODIUM LACTATE, POTASSIUM CHLORIDE, CALCIUM CHLORIDE 600; 310; 30; 20 MG/100ML; MG/100ML; MG/100ML; MG/100ML
INJECTION, SOLUTION INTRAVENOUS CONTINUOUS PRN
Status: DISCONTINUED | OUTPATIENT
Start: 2020-02-04 | End: 2020-02-04 | Stop reason: SDUPTHER

## 2020-02-04 RX ORDER — DIPHENHYDRAMINE HYDROCHLORIDE 50 MG/ML
12.5 INJECTION INTRAMUSCULAR; INTRAVENOUS
Status: DISCONTINUED | OUTPATIENT
Start: 2020-02-04 | End: 2020-02-04 | Stop reason: HOSPADM

## 2020-02-04 RX ORDER — SODIUM CHLORIDE 0.9 % (FLUSH) 0.9 %
10 SYRINGE (ML) INJECTION EVERY 12 HOURS SCHEDULED
Status: DISCONTINUED | OUTPATIENT
Start: 2020-02-04 | End: 2020-02-04 | Stop reason: HOSPADM

## 2020-02-04 RX ORDER — DOCUSATE SODIUM 100 MG/1
100 CAPSULE, LIQUID FILLED ORAL 2 TIMES DAILY
Status: DISCONTINUED | OUTPATIENT
Start: 2020-02-04 | End: 2020-02-06

## 2020-02-04 RX ORDER — METOCLOPRAMIDE HYDROCHLORIDE 5 MG/ML
10 INJECTION INTRAMUSCULAR; INTRAVENOUS
Status: DISCONTINUED | OUTPATIENT
Start: 2020-02-04 | End: 2020-02-04 | Stop reason: HOSPADM

## 2020-02-04 RX ORDER — ONDANSETRON 2 MG/ML
INJECTION INTRAMUSCULAR; INTRAVENOUS PRN
Status: DISCONTINUED | OUTPATIENT
Start: 2020-02-04 | End: 2020-02-04 | Stop reason: SDUPTHER

## 2020-02-04 RX ORDER — FENTANYL CITRATE 50 UG/ML
INJECTION, SOLUTION INTRAMUSCULAR; INTRAVENOUS PRN
Status: DISCONTINUED | OUTPATIENT
Start: 2020-02-04 | End: 2020-02-04 | Stop reason: SDUPTHER

## 2020-02-04 RX ORDER — MAGNESIUM HYDROXIDE 1200 MG/15ML
LIQUID ORAL CONTINUOUS PRN
Status: COMPLETED | OUTPATIENT
Start: 2020-02-04 | End: 2020-02-04

## 2020-02-04 RX ORDER — ONDANSETRON 2 MG/ML
4 INJECTION INTRAMUSCULAR; INTRAVENOUS EVERY 6 HOURS PRN
Status: DISCONTINUED | OUTPATIENT
Start: 2020-02-04 | End: 2020-02-06

## 2020-02-04 RX ORDER — CEFAZOLIN SODIUM 2 G/50ML
2 SOLUTION INTRAVENOUS EVERY 8 HOURS
Status: COMPLETED | OUTPATIENT
Start: 2020-02-04 | End: 2020-02-05

## 2020-02-04 RX ORDER — MORPHINE SULFATE 2 MG/ML
2 INJECTION, SOLUTION INTRAMUSCULAR; INTRAVENOUS
Status: DISCONTINUED | OUTPATIENT
Start: 2020-02-04 | End: 2020-02-07

## 2020-02-04 RX ORDER — MIDAZOLAM HYDROCHLORIDE 1 MG/ML
INJECTION INTRAMUSCULAR; INTRAVENOUS PRN
Status: DISCONTINUED | OUTPATIENT
Start: 2020-02-04 | End: 2020-02-04 | Stop reason: SDUPTHER

## 2020-02-04 RX ORDER — SODIUM CHLORIDE 0.9 % (FLUSH) 0.9 %
10 SYRINGE (ML) INJECTION PRN
Status: DISCONTINUED | OUTPATIENT
Start: 2020-02-04 | End: 2020-02-10 | Stop reason: HOSPADM

## 2020-02-04 RX ORDER — ACETAMINOPHEN 500 MG
1000 TABLET ORAL ONCE
Status: COMPLETED | OUTPATIENT
Start: 2020-02-04 | End: 2020-02-04

## 2020-02-04 RX ORDER — FENTANYL CITRATE 50 UG/ML
50 INJECTION, SOLUTION INTRAMUSCULAR; INTRAVENOUS EVERY 10 MIN PRN
Status: DISCONTINUED | OUTPATIENT
Start: 2020-02-04 | End: 2020-02-04 | Stop reason: HOSPADM

## 2020-02-04 RX ORDER — LIDOCAINE HYDROCHLORIDE 20 MG/ML
INJECTION, SOLUTION INTRAVENOUS PRN
Status: DISCONTINUED | OUTPATIENT
Start: 2020-02-04 | End: 2020-02-04 | Stop reason: SDUPTHER

## 2020-02-04 RX ORDER — MEPERIDINE HYDROCHLORIDE 25 MG/ML
12.5 INJECTION INTRAMUSCULAR; INTRAVENOUS; SUBCUTANEOUS EVERY 5 MIN PRN
Status: DISCONTINUED | OUTPATIENT
Start: 2020-02-04 | End: 2020-02-04 | Stop reason: HOSPADM

## 2020-02-04 RX ORDER — SODIUM CHLORIDE 9 MG/ML
INJECTION, SOLUTION INTRAVENOUS CONTINUOUS
Status: DISCONTINUED | OUTPATIENT
Start: 2020-02-04 | End: 2020-02-07

## 2020-02-04 RX ORDER — SODIUM CHLORIDE 0.9 % (FLUSH) 0.9 %
10 SYRINGE (ML) INJECTION PRN
Status: DISCONTINUED | OUTPATIENT
Start: 2020-02-04 | End: 2020-02-04 | Stop reason: HOSPADM

## 2020-02-04 RX ORDER — ROPIVACAINE HYDROCHLORIDE 5 MG/ML
INJECTION, SOLUTION EPIDURAL; INFILTRATION; PERINEURAL PRN
Status: DISCONTINUED | OUTPATIENT
Start: 2020-02-04 | End: 2020-02-04 | Stop reason: SDUPTHER

## 2020-02-04 RX ORDER — OXYCODONE HYDROCHLORIDE 5 MG/1
5 TABLET ORAL EVERY 4 HOURS PRN
Status: DISCONTINUED | OUTPATIENT
Start: 2020-02-04 | End: 2020-02-07

## 2020-02-04 RX ADMIN — SODIUM CHLORIDE, POTASSIUM CHLORIDE, SODIUM LACTATE AND CALCIUM CHLORIDE: 600; 310; 30; 20 INJECTION, SOLUTION INTRAVENOUS at 13:54

## 2020-02-04 RX ADMIN — DIAZEPAM 5 MG: 5 TABLET ORAL at 22:14

## 2020-02-04 RX ADMIN — PROPOFOL 20 MG: 10 INJECTION, EMULSION INTRAVENOUS at 13:28

## 2020-02-04 RX ADMIN — CEFAZOLIN SODIUM 2 G: 2 SOLUTION INTRAVENOUS at 12:55

## 2020-02-04 RX ADMIN — DEXAMETHASONE SODIUM PHOSPHATE 4 MG: 4 INJECTION, SOLUTION INTRA-ARTICULAR; INTRALESIONAL; INTRAMUSCULAR; INTRAVENOUS; SOFT TISSUE at 13:01

## 2020-02-04 RX ADMIN — MIDAZOLAM HYDROCHLORIDE 2 MG: 2 INJECTION, SOLUTION INTRAMUSCULAR; INTRAVENOUS at 12:53

## 2020-02-04 RX ADMIN — LIDOCAINE HYDROCHLORIDE 10 ML: 20 INJECTION, SOLUTION EPIDURAL; INFILTRATION; INTRACAUDAL; PERINEURAL at 15:12

## 2020-02-04 RX ADMIN — FENTANYL CITRATE 100 MCG: 50 INJECTION, SOLUTION INTRAMUSCULAR; INTRAVENOUS at 12:56

## 2020-02-04 RX ADMIN — SODIUM CHLORIDE: 9 INJECTION, SOLUTION INTRAVENOUS at 19:35

## 2020-02-04 RX ADMIN — ACETAMINOPHEN 1000 MG: 500 TABLET ORAL at 08:29

## 2020-02-04 RX ADMIN — VANCOMYCIN HYDROCHLORIDE 1500 MG: 5 INJECTION, POWDER, LYOPHILIZED, FOR SOLUTION INTRAVENOUS at 22:14

## 2020-02-04 RX ADMIN — VANCOMYCIN HYDROCHLORIDE 1500 MG: 5 INJECTION, POWDER, LYOPHILIZED, FOR SOLUTION INTRAVENOUS at 02:52

## 2020-02-04 RX ADMIN — PROPOFOL 200 MG: 10 INJECTION, EMULSION INTRAVENOUS at 13:00

## 2020-02-04 RX ADMIN — PIPERACILLIN AND TAZOBACTAM 3.38 G: 3; .375 INJECTION, POWDER, LYOPHILIZED, FOR SOLUTION INTRAVENOUS at 00:01

## 2020-02-04 RX ADMIN — ROPIVACAINE HYDROCHLORIDE 20 ML: 5 INJECTION, SOLUTION EPIDURAL; INFILTRATION; PERINEURAL at 15:12

## 2020-02-04 RX ADMIN — OXYCODONE HYDROCHLORIDE 5 MG: 5 TABLET ORAL at 22:14

## 2020-02-04 RX ADMIN — LIDOCAINE HYDROCHLORIDE 60 MG: 20 INJECTION, SOLUTION INTRAVENOUS at 13:00

## 2020-02-04 RX ADMIN — FENTANYL CITRATE 25 MCG: 50 INJECTION, SOLUTION INTRAMUSCULAR; INTRAVENOUS at 13:28

## 2020-02-04 RX ADMIN — DOCUSATE SODIUM 100 MG: 100 CAPSULE, LIQUID FILLED ORAL at 22:14

## 2020-02-04 RX ADMIN — ONDANSETRON 4 MG: 2 INJECTION INTRAMUSCULAR; INTRAVENOUS at 13:01

## 2020-02-04 RX ADMIN — MORPHINE SULFATE 2 MG: 2 INJECTION, SOLUTION INTRAMUSCULAR; INTRAVENOUS at 16:32

## 2020-02-04 RX ADMIN — CEFAZOLIN SODIUM 2 G: 2 SOLUTION INTRAVENOUS at 19:35

## 2020-02-04 ASSESSMENT — PULMONARY FUNCTION TESTS
PIF_VALUE: 11
PIF_VALUE: 3
PIF_VALUE: 2
PIF_VALUE: 6
PIF_VALUE: 12
PIF_VALUE: 0
PIF_VALUE: 11
PIF_VALUE: 9
PIF_VALUE: 10
PIF_VALUE: 11
PIF_VALUE: 6
PIF_VALUE: 10
PIF_VALUE: 2
PIF_VALUE: 4
PIF_VALUE: 10
PIF_VALUE: 6
PIF_VALUE: 11
PIF_VALUE: 2
PIF_VALUE: 4
PIF_VALUE: 2
PIF_VALUE: 2
PIF_VALUE: 10
PIF_VALUE: 11
PIF_VALUE: 1
PIF_VALUE: 12
PIF_VALUE: 2
PIF_VALUE: 9
PIF_VALUE: 3
PIF_VALUE: 2
PIF_VALUE: 10
PIF_VALUE: 1
PIF_VALUE: 12
PIF_VALUE: 0
PIF_VALUE: 9
PIF_VALUE: 2
PIF_VALUE: 11
PIF_VALUE: 11
PIF_VALUE: 2
PIF_VALUE: 2
PIF_VALUE: 9
PIF_VALUE: 11
PIF_VALUE: 3
PIF_VALUE: 11
PIF_VALUE: 6
PIF_VALUE: 3
PIF_VALUE: 6
PIF_VALUE: 11
PIF_VALUE: 2
PIF_VALUE: 8
PIF_VALUE: 10
PIF_VALUE: 12
PIF_VALUE: 11
PIF_VALUE: 12
PIF_VALUE: 2
PIF_VALUE: 2
PIF_VALUE: 3
PIF_VALUE: 12
PIF_VALUE: 12
PIF_VALUE: 2
PIF_VALUE: 11
PIF_VALUE: 2
PIF_VALUE: 9
PIF_VALUE: 10
PIF_VALUE: 9
PIF_VALUE: 1
PIF_VALUE: 2
PIF_VALUE: 10
PIF_VALUE: 12
PIF_VALUE: 11
PIF_VALUE: 2
PIF_VALUE: 2
PIF_VALUE: 25
PIF_VALUE: 11
PIF_VALUE: 12
PIF_VALUE: 12
PIF_VALUE: 1
PIF_VALUE: 2
PIF_VALUE: 12
PIF_VALUE: 2
PIF_VALUE: 11
PIF_VALUE: 11
PIF_VALUE: 6
PIF_VALUE: 10
PIF_VALUE: 0
PIF_VALUE: 6
PIF_VALUE: 1
PIF_VALUE: 2
PIF_VALUE: 11
PIF_VALUE: 3
PIF_VALUE: 11
PIF_VALUE: 2
PIF_VALUE: 11
PIF_VALUE: 12
PIF_VALUE: 2

## 2020-02-04 ASSESSMENT — PAIN - FUNCTIONAL ASSESSMENT: PAIN_FUNCTIONAL_ASSESSMENT: 0-10

## 2020-02-04 ASSESSMENT — PAIN SCALES - GENERAL
PAINLEVEL_OUTOF10: 6
PAINLEVEL_OUTOF10: 1
PAINLEVEL_OUTOF10: 0
PAINLEVEL_OUTOF10: 5

## 2020-02-04 NOTE — H&P
[address]                      [facility], [address] [facility]                              HISTORY AND PHYSICAL    PATIENT NAME: Josue Yin                   :        1980  MED REC NO:   [id number]                            ROOM:       [id number]  ACCOUNT NO:   [de-identified]                           ADMIT DATE: 2020  PROVIDER:     Jace Mills MD    CHIEF COMPLAINT:  Right hip pain. HISTORY:  The patient is a 78-year-old male status post previous right  total hip arthroplasty by Dr. Robe Resendiz in 2018. He was noted to have  increased swelling three to four weeks ago around the right hip. He was  felt to have an abscess versus seroma. He was originally scheduled for  an aspiration and radiology; however, he presents to the ER with  increased pain and swelling. He has been admitted and started on  antibiotics by the medicine team.    PAST MEDICAL HISTORY:  Multiple including splenectomy and Hodgkin's  lymphoma. MEDICATIONS:  Multiple. ALLERGIES:  TRAMADOL. Please refer to the intake H and P regarding the patient's review of  systems, family history, surgical history, and social history as was  done today. PHYSICAL EXAMINATION:  HEENT:  Normal.  LUNGS:  Clear. HEART:  Regular. Abdomen:  Soft and nontender. SKIN:  Normal.  NEUROLOGIC:  He is intact. EXTREMITIES:  He is actually able to flex and extend the hip quite well  without any significant discomfort. He has no knee pain. He has a  large palpable fluid mass in the posterolateral aspect of the right hip  in line with his previous hip incision. There is a slight redness  around the distal tip of the incision as well. He states it is much  less tender today. Again, he can move the foot and toes. Denies any  other trauma. He is neurovascularly intact throughout. RADIOGRAPHS:  X-rays of the right hip showed a stable-appearing total  hip arthroplasty.     LABORATORY WORK:  Showed relatively normal labs including BMP and CBC  with a normal white count. Platelets are elevated again due to his  splenectomy. IMPRESSION:  Right hip pain and swelling, status post previous total hip  arthroplasty. PLAN:  At this point, the potential for deep infection was discussed in  length with the patient. This may also be more of a superficial seroma. Regardless, I would recommend proceeding with open irrigation and  debridement to explore the deep fascia to see if this does extend deep  within the joint itself. There did not appear to be _____ fluid beneath  the fascia on CT scan, although it was difficult to assess. He is  already feeling somewhat better, but has been started on antibiotics. As such, cultures may be difficult to interpret. We will proceed today  with open irrigation and debridement as well as gentle drain placement. He understands the potential need for resection arthroplasty if there is  deep infection. Specific risks of surgery were noted including  infection, stiffness, nerve damage, continued pain, as well as need for  subsequent operations. All questions were answered today with the  patient at the bedside.         Jace Young MD    D: 02/04/2020 9:13:21       T: 02/04/2020 9:18:11     [email or web address]  Job#: [id number]     Doc#: [id number]    CC:

## 2020-02-04 NOTE — BRIEF OP NOTE
Brief Postoperative Note  ______________________________________________________________    Patient: Joycelyn Joyce  YOB: 1980  MRN: [id number]  Date of Procedure: 2/4/2020    Pre-Op Diagnosis: right hip fluid collection    Post-Op Diagnosis: Same       Procedure(s):  RIGHT HIP  INCISION AND DRAINAGE    Anesthesia: General    Surgeon(s):  Aubrey Lamb MD    Assistant: Brook Malik, [facility]    Estimated Blood Loss (mL): less than 50     Complications: None    Specimens:   ID Type Source Tests Collected by Time Destination   1 : superifical deep tissue culture Tissue Joint, Hip SURGICAL CULTURE Aubrey Lamb MD 2/4/2020 1351    2 : superficial fluid culture  Swab Joint, Hip SURGICAL CULTURE Aubrey Lamb MD 2/4/2020 1352    3 : deep fluid culture  Swab Joint, Hip SURGICAL CULTURE Aubrey Lamb MD 2/4/2020 1343    4 : deep fluid culture #2 Swab Joint, Hip SURGICAL CULTURE Aubrey Lamb MD 2/4/2020 1344    5 : deep tissue culture  Tissue Joint, Hip SURGICAL CULTURE Aubrey Lamb MD 2/4/2020 1346        Implants:  * No implants in log *      Drains:   Closed/Suction Drain Right;Lateral Hip Accordion 10 Croatian (Active)       Closed/Suction Drain Right;Lateral Hip Accordion 15 Croatian (Active)       Findings: cx x5    Aubrey Lamb MD  Date: 2/4/2020  Time: 2:21 PM

## 2020-02-04 NOTE — PROGRESS NOTES
2010: Dr. Doni Lopez paged to clarify NPO and bedrest orders, Dr. Doni Lopez wants the patient to be NPO at midnight for any potential interventions in the morning, but stated that the patient can be up as tolerated and does not need to be bedrest.     2139: Patient is alert and oriented x4. Patient came in for right hip abscess with increasing pain, redness, and swelling. The patient was following OP with ortho team and was working with insurance for approval for aspiration. However, with the increase in symptoms, patient went to [facility] ER and was transferred to [facility] EMERGENCY [facility] [place]. Patient's right hip in slightly reddened, with swelling and pain exacerbated with touch or movement. Patient was medicated with morphine for the pain. Patient is up independently with steady gait. Skin is intact. Patient is aware of NPO status at midnight, compliant. Patient has no further requests at this time. Will monitor.

## 2020-02-04 NOTE — ANESTHESIA PROCEDURE NOTES
Peripheral Block    Patient location during procedure: post-op  Start time: 2/4/2020 3:10 PM  End time: 2/4/2020 3:15 PM  Staffing  Anesthesiologist: Pedro Luis Hsu MD  Performed: anesthesiologist   Preanesthetic Checklist  Completed: patient identified, site marked, surgical consent, pre-op evaluation, timeout performed, IV checked, risks and benefits discussed, monitors and equipment checked, anesthesia consent given, oxygen available and patient being monitored  Peripheral Block  Patient position: supine  Prep: ChloraPrep  Patient monitoring: cardiac monitor, continuous pulse ox, frequent blood pressure checks and IV access  Block type: Fascia iliaca  Laterality: right  Injection technique: single-shot  Procedures: ultrasound guided and nerve stimulator  Local infiltration: ropivacaine and lidocaine  Infiltration strength: 0.5 %  Dose: 20 mL  Provider prep: mask and sterile gloves (Sterile probe cover)  Local infiltration: ropivacaine and lidocaine  Needle  Needle type: combined needle/nerve stimulator   Needle gauge: 21 G  Needle length: 10 cm  Needle localization: anatomical landmarks and ultrasound guidance  Assessment  Injection assessment: negative aspiration for heme, no paresthesia on injection and local visualized surrounding nerve on ultrasound  Paresthesia pain: immediately resolved  Slow fractionated injection: yes  Hemodynamics: stable  Additional Notes  Ultrasound image printed and saved in patient chart.     Sterile probe cover used    Reason for block: post-op pain management and at surgeon's request

## 2020-02-04 NOTE — PROGRESS NOTES
vancomycin (VANCOCIN) intermittent dosing (placeholder)   Other RX Placeholder    [Held by provider] vancomycin  15 mg/kg Intravenous Q12H    sodium chloride flush  10 mL Intravenous 2 times per day    ceFAZolin (ANCEF) IVPB  2 g Intravenous On Call to OR    [Held by provider] piperacillin-tazobactam  3.375 g Intravenous Q8H     PRN Meds: sodium chloride flush, sodium chloride flush, fentanNYL, diphenhydrAMINE, ondansetron, metoclopramide, meperidine, magnesium hydroxide, ondansetron, oxyCODONE, diazepam, acetaminophen    Labs:   Recent Labs     02/03/20  1311 02/03/20  1918 02/04/20  0519   WBC 9.8 9.9 9.8   HGB 13.6* 12.2* 12.9*   HCT 42.9 37.7* 38.9*   * 442* 488*     Recent Labs     02/03/20  1316 02/04/20  0519 02/04/20  0904    140 139   K 4.6 4.2 4.5   CL 99 102 100   CO2 28 25 25   BUN 13 8 7   CREATININE <0.17* 0.63* 0.62*   CALCIUM 10.0* 9.2 9.2     Recent Labs     02/03/20  1316   AST 22   ALT 12   BILITOT <0.2   ALKPHOS 106*     Recent Labs     02/03/20  1918   INR 1.1     No results for input(s): Verneta Amy in the last 72 hours. Urinalysis:   Lab Results   Component Value Date    NITRU Negative 02/03/2020    BLOODU Negative 02/03/2020    SPECGRAV 1.026 02/03/2020    GLUCOSEU Negative 02/03/2020       Radiology:   Most recent    Chest CT      WITH CONTRAST:No results found for this or any previous visit. WITHOUT CONTRAST: No results found for this or any previous visit. CXR      2-view: No results found for this or any previous visit. Portable: No results found for this or any previous visit. Echo No results found for this or any previous visit. Assessment/Plan:    Active Hospital Problems    Diagnosis Date Noted    Hip pain, acute, right [M25.551] 02/04/2020     Right Hip abscess vs seroma: surgical evaluation by ortho today. ID consulted. Weight beiring, dvt ppx per ortho.      Additional work up or/and treatment plan may be added today or then after based on clinical progression. I am managing a portion of pt care. Some medical issues are handled byother specialists. Additional work up and treatment should be done in out pt setting by pt PCP and other out pt providers. In addition to examining and evaluating pt, I spent additional time explaining care, normaland abnormal findings, and treatment plan. All of pt questions were answered. Counseling, diet and education were provided. Case will be discussed with nursing staff when appropriate. Family will be updated if and whenappropriate.       Electronically signed by Baljeet Kumari DO on 2/4/2020 at 1:24 PM

## 2020-02-04 NOTE — ANESTHESIA POSTPROCEDURE EVALUATION
Department of Anesthesiology  Postprocedure Note    Patient: Conor Joenggm  MRN: [id number]  YOB: 1980  Date of evaluation: 2/4/2020  Time:  2:38 PM     Procedure Summary     Date:  02/04/20 Room / Location:  [address]    Anesthesia Start:  1255 Anesthesia Stop:  7271    Procedure:  RIGHT HIP  INCISION AND DRAINAGE (Right ) Diagnosis:  (right hip fluid collection)    Surgeon:  Arslan Steven MD Responsible Provider:  Tennille Ureña MD    Anesthesia Type:  general ASA Status:  2          Anesthesia Type: general    Fausto Phase I: Fausto Score: 10    Fausto Phase II:      Last vitals: Reviewed and per EMR flowsheets.        Anesthesia Post Evaluation    Patient location during evaluation: PACU  Patient participation: complete - patient participated  Level of consciousness: awake  Pain score: 0  Airway patency: patent  Nausea & Vomiting: no nausea and no vomiting  Complications: no  Cardiovascular status: hemodynamically stable  Respiratory status: acceptable  Hydration status: euvolemic

## 2020-02-04 NOTE — CONSULTS
Hospital Medicine History & Physical      PCP: Stu España MD    Date of Admission: 2/3/2020      Chief Complaint: Hip pain      History Of Present Illness:    37 y.o. male who presents to the emergency department patient with a history of Hodgkin lymphoma status post splenectomy replacement of the right hip in 2018 by Dr. Ring Dose patient has no injury no fall has increasing hip pain came here for further evaluation no numbness tingling to the toes or to the leg no fever no chills pain is worse on ambulation, patient problem started to 3 weeks ago as per patient with hip pain and noted to have a abscess which is supposed to be drained by orthopedic patient got increasing pain and swelling so decided to come here to be checked out no drainage no documented fever. Past Medical History:          Diagnosis Date    Cancer [facility]) 1993    Hodgkins Lymphoma       Past Surgical History:          Procedure Laterality Date    LYMPH NODE BIOPSY      [place] TOTAL HIP ARTHROPLASTY Right 5/31/2018    RIGHT HIP TOTAL HIP ARTHROPLASTY LITTLE performed by Cara Parker MD at [address] [place] [address]         Medications Prior to Admission:      Prior to Admission medications    Not on File       Allergies:  Tramadol    Social History:      The patient currently lives at home. TOBACCO:   reports that he has never smoked. He has quit using smokeless tobacco.  His smokeless tobacco use included chew. ETOH:   reports current alcohol use. Family History:      Reviewed in detail and negative for DM, CAD, Cancer, CVA. Positive as follows:        Problem Relation Age of Onset    Depression Father         suicide    Depression Sister         suicide    Stroke Maternal Grandfather     Cancer Maternal Grandmother         lung    Heart Attack Maternal Grandmother        REVIEW OF SYSTEMS:   Pertinent positives as noted in the HPI. All other systems reviewed and negative.     PHYSICAL EXAM:    Vitals:    02/03/20 1810 02/03/20 1924 02/03/20 2130   BP: 134/83 111/67    Pulse: 99 86    Resp: 18 18    Temp: 98.5 °F (36.9 °C) 98.6 °F (37 °C)    TempSrc: Oral Oral    SpO2: 99% 98%    Weight:   236 lb 1.8 oz (107.1 kg)   Height:   6' 2\" (1.88 m)       General appearance:  No apparent distress, appears stated age and cooperative. HEENT:  Normal cephalic, atraumatic without obvious deformity. Pupils equal, round, and reactive to light. Extra ocular muscles intact. Conjunctivae/corneas clear. Neck: Supple, with full range of motion. No jugular venous distention. Trachea midline. Respiratory:  Normal respiratory effort. Clear to auscultation, bilaterally without Rales/Wheezes/Rhonchi. Cardiovascular:  Regular rate and rhythm with normal S1/S2 without murmurs, rubs or gallops. Abdomen: Soft, non-tender, non-distended with normal bowel sounds. Musculoskeletal: Positive for arthralgias, joint swelling and myalgias. Comments: Patient going to the right hip patient is straight leg raising is negative patient has no sensory deficit large area of fluctuation erythema and warmth of the right hip consistent with a developing abscess    Skin: Skin color, texture, turgor normal.  No rashes or lesions. Neurologic:  Neurovascularly intact without any focal sensory/motor deficits.  Cranial nerves: II-XII intact, grossly non-focal.  Psychiatric:  Alert and oriented, thought content appropriate, normal insight  Capillary Refill: Brisk,< 3 seconds   Peripheral Pulses: +2 palpable, equal bilaterally       Labs:     Recent Labs     02/03/20  1311 02/03/20 1918   WBC 9.8 9.9   HGB 13.6* 12.2*   HCT 42.9 37.7*   * 442*     Recent Labs     02/03/20  1316      K 4.6   CL 99   CO2 28   BUN 13   CREATININE <0.17*   CALCIUM 10.0*     Recent Labs     02/03/20  1316   AST 22   ALT 12   BILITOT <0.2   ALKPHOS 106*     Recent Labs     02/03/20 1918   INR 1.1     No results for input(s): Silke Medici in the last 72 hours. Urinalysis:      Lab Results   Component Value Date    NITRU Negative 05/22/2018    BLOODU Negative 05/22/2018    SPECGRAV 1.010 05/22/2018    GLUCOSEU Negative 05/22/2018       Radiology:     CXR: I have reviewed the CXR with the following interpretation: Per radiologist  EKG:  I have reviewed the EKG with the following interpretation: Please review    No orders to display       ASSESSMENT:    There are no active hospital problems to display for this patient. PLAN:  Right hip pain: orthopedics consulted, will monitor patient for any increase in pain, vital signs every shift and as needed, will follow recommendations of orthopedics. Abscess and cellulitis of gluteal region: Infectious disease consulted, signs every shift and as needed, will follow recommendations of infectious disease, labs to be repeated in a.m. to be reassessed and evaluated. DVT Prophylaxis: Compression stockings  Diet: General diet  Code Status: Full Code    PT/OT Eval Status: To evaluate and treat    Dispo -To be determined       Sherry Welsh, SHRUTHI - CNP    Thank you Yvette Humphreys MD for the opportunity to be involved in this patient's care. If you have any questions or concerns please feel free to contact me.

## 2020-02-04 NOTE — ANESTHESIA PRE PROCEDURE
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    Osteoarthritis of right hip M16.11    Status post total replacement of right hip Z96.641       Past Medical History:        Diagnosis Date    Cancer [facility]) 1993    Hodgkins Lymphoma       Past Surgical History:        Procedure Laterality Date    LYMPH NODE BIOPSY      [place] TOTAL HIP ARTHROPLASTY Right 5/31/2018    RIGHT HIP TOTAL HIP ARTHROPLASTY LITTLE performed by Maricel Mckeon MD at [address] [place] [address]         Social History:    Social History     Tobacco Use    Smoking status: Never Smoker    Smokeless tobacco: Former User     Types: Chew   Substance Use Topics    Alcohol use: Yes     Comment: 1 case of beer throughout the week                                 Counseling given: Not Answered      Vital Signs (Current):   Vitals:    02/03/20 2130 02/04/20 1135 02/04/20 1136 02/04/20 1150   BP:    117/65   Pulse:    63   Resp:    14   Temp:    98.7 °F (37.1 °C)   TempSrc:    Temporal   SpO2:    95%   Weight: 236 lb 1.8 oz (107.1 kg) 108 lb (49 kg) 228 lb 1 oz (103.4 kg)    Height: 6' 2\" (1.88 m)  6' 2\" (1.88 m)                                               BP Readings from Last 3 Encounters:   02/04/20 117/65   02/03/20 137/65   09/18/19 118/72       NPO Status: Time of last liquid consumption: 2200                        Time of last solid consumption: 1500                        Date of last liquid consumption: 02/03/20                        Date of last solid food consumption: 02/03/20    BMI:   Wt Readings from Last 3 Encounters:   02/04/20 228 lb 1 oz (103.4 kg)   02/03/20 230 lb (104.3 kg)   09/18/19 225 lb (102.1 kg)     Body mass index is 29.28 kg/m². CBC:   Lab Results   Component Value Date    WBC 9.8 02/04/2020    RBC 4.59 02/04/2020    RBC 4.55 05/06/2018    HGB 12.9 02/04/2020    HCT 38.9 02/04/2020    MCV 84.8 02/04/2020    RDW 15.4 02/04/2020     02/04/2020       CMP:   Lab Results   Component Value Date     02/04/2020    K 4.5 02/04/2020     02/04/2020    CO2 25 02/04/2020    BUN 7 02/04/2020    CREATININE 0.62 02/04/2020    GFRAA >60.0 02/04/2020    AGRATIO 0.8 05/06/2018    LABGLOM >60.0 02/04/2020    GLUCOSE 96 02/04/2020    GLUCOSE 100 05/06/2018    PROT 8.9 02/03/2020    CALCIUM 9.2 02/04/2020    BILITOT <0.2 02/03/2020    ALKPHOS 106 02/03/2020    AST 22 02/03/2020    ALT 12 02/03/2020       POC Tests: No results for input(s): POCGLU, POCNA, POCK, POCCL, POCBUN, POCHEMO, POCHCT in the last 72 hours.     Coags:   Lab Results   Component Value Date    PROTIME 15.0 02/03/2020    INR 1.1 02/03/2020    APTT 34.8 02/03/2020       HCG (If Applicable): No results found for: PREGTESTUR, PREGSERUM, HCG, HCGQUANT     ABGs: No results found for: PHART, PO2ART, NEI4MLJ, VOY7DSI, BEART, K4VWSJNQ     Type & Screen (If Applicable):  No results found for: CH[facility]Y [facility]    Anesthesia Evaluation  Patient summary reviewed and Nursing notes reviewed no history of anesthetic complications:   Airway: Mallampati: II  TM distance: >3 FB   Neck ROM: full  Mouth opening: > = 3 FB Dental: normal exam         Pulmonary:Negative Pulmonary ROS and normal exam  breath sounds clear to auscultation                             Cardiovascular:Negative CV ROS  Exercise tolerance: good (>4 METS),         ECG reviewed  Rhythm: regular  Rate: normal           Beta Blocker:  Not on Beta Blocker Neuro/Psych:   Negative Neuro/Psych ROS              GI/Hepatic/Renal: Neg GI/Hepatic/Renal ROS            Endo/Other:    (+) : arthritis: OA., malignancy/cancer. Pt had PAT visit. Abdominal:           Vascular: negative vascular ROS. Anesthesia Plan      general     ASA 2     (LMA)  Induction: intravenous. MIPS: Postoperative opioids intended and Prophylactic antiemetics administered. Anesthetic plan and risks discussed with patient. Plan discussed with CRNA.     Attending anesthesiologist reviewed and agrees with Abhinav Michaels DO   2/4/2020

## 2020-02-05 ENCOUNTER — APPOINTMENT (OUTPATIENT)
Dept: INTERVENTIONAL RADIOLOGY/VASCULAR | Age: 40
DRG: 464 | End: 2020-02-05
Attending: ORTHOPAEDIC SURGERY
Payer: COMMERCIAL

## 2020-02-05 ENCOUNTER — ANESTHESIA EVENT (OUTPATIENT)
Dept: OPERATING ROOM | Age: 40
DRG: 464 | End: 2020-02-05
Payer: COMMERCIAL

## 2020-02-05 LAB
ANION GAP SERPL CALCULATED.3IONS-SCNC: 12 MEQ/L (ref 9–15)
BASOPHILS ABSOLUTE: 0.1 K/UL (ref 0–0.2)
BASOPHILS RELATIVE PERCENT: 0.6 %
BUN BLDV-MCNC: 6 MG/DL (ref 6–20)
CALCIUM SERPL-MCNC: 9 MG/DL (ref 8.5–9.9)
CHLORIDE BLD-SCNC: 101 MEQ/L (ref 95–107)
CO2: 25 MEQ/L (ref 20–31)
CREAT SERPL-MCNC: 0.64 MG/DL (ref 0.7–1.2)
EOSINOPHILS ABSOLUTE: 0 K/UL (ref 0–0.7)
EOSINOPHILS RELATIVE PERCENT: 0.2 %
GFR AFRICAN AMERICAN: >60
GFR NON-AFRICAN AMERICAN: >60
GLUCOSE BLD-MCNC: 119 MG/DL (ref 70–99)
HCT VFR BLD CALC: 36.5 % (ref 42–52)
HEMOGLOBIN: 11.8 G/DL (ref 14–18)
LYMPHOCYTES ABSOLUTE: 1.7 K/UL (ref 1–4.8)
LYMPHOCYTES RELATIVE PERCENT: 11.9 %
MCH RBC QN AUTO: 27.3 PG (ref 27–31.3)
MCHC RBC AUTO-ENTMCNC: 32.2 % (ref 33–37)
MCV RBC AUTO: 84.9 FL (ref 80–100)
MONOCYTES ABSOLUTE: 1.4 K/UL (ref 0.2–0.8)
MONOCYTES RELATIVE PERCENT: 9.9 %
NEUTROPHILS ABSOLUTE: 10.7 K/UL (ref 1.4–6.5)
NEUTROPHILS RELATIVE PERCENT: 77.4 %
PDW BLD-RTO: 14.9 % (ref 11.5–14.5)
PLATELET # BLD: 441 K/UL (ref 130–400)
POTASSIUM SERPL-SCNC: 4.3 MEQ/L (ref 3.4–4.9)
RBC # BLD: 4.3 M/UL (ref 4.7–6.1)
SODIUM BLD-SCNC: 138 MEQ/L (ref 135–144)
WBC # BLD: 13.9 K/UL (ref 4.8–10.8)

## 2020-02-05 PROCEDURE — 6360000002 HC RX W HCPCS: Performed by: ORTHOPAEDIC SURGERY

## 2020-02-05 PROCEDURE — 80048 BASIC METABOLIC PNL TOTAL CA: CPT

## 2020-02-05 PROCEDURE — 99222 1ST HOSP IP/OBS MODERATE 55: CPT | Performed by: INTERNAL MEDICINE

## 2020-02-05 PROCEDURE — 2709999900 IR PICC WO SQ PORT/PUMP > 5 YEARS

## 2020-02-05 PROCEDURE — 2580000003 HC RX 258: Performed by: NURSE PRACTITIONER

## 2020-02-05 PROCEDURE — 1210000000 HC MED SURG R&B

## 2020-02-05 PROCEDURE — 36415 COLL VENOUS BLD VENIPUNCTURE: CPT

## 2020-02-05 PROCEDURE — 87040 BLOOD CULTURE FOR BACTERIA: CPT

## 2020-02-05 PROCEDURE — 6360000002 HC RX W HCPCS: Performed by: INTERNAL MEDICINE

## 2020-02-05 PROCEDURE — 2500000003 HC RX 250 WO HCPCS: Performed by: NURSE PRACTITIONER

## 2020-02-05 PROCEDURE — 2580000003 HC RX 258: Performed by: INTERNAL MEDICINE

## 2020-02-05 PROCEDURE — 85025 COMPLETE CBC W/AUTO DIFF WBC: CPT

## 2020-02-05 PROCEDURE — 36584 COMPL RPLCMT PICC RS&I: CPT

## 2020-02-05 PROCEDURE — C1751 CATH, INF, PER/CENT/MIDLINE: HCPCS

## 2020-02-05 PROCEDURE — 2580000003 HC RX 258: Performed by: ORTHOPAEDIC SURGERY

## 2020-02-05 PROCEDURE — 36573 INSJ PICC RS&I 5 YR+: CPT | Performed by: RADIOLOGY

## 2020-02-05 PROCEDURE — 02HV33Z INSERTION OF INFUSION DEVICE INTO SUPERIOR VENA CAVA, PERCUTANEOUS APPROACH: ICD-10-PCS | Performed by: RADIOLOGY

## 2020-02-05 PROCEDURE — 6370000000 HC RX 637 (ALT 250 FOR IP): Performed by: ORTHOPAEDIC SURGERY

## 2020-02-05 RX ORDER — SODIUM CHLORIDE 0.9 % (FLUSH) 0.9 %
10 SYRINGE (ML) INJECTION EVERY 12 HOURS SCHEDULED
Status: DISCONTINUED | OUTPATIENT
Start: 2020-02-05 | End: 2020-02-06

## 2020-02-05 RX ORDER — SODIUM CHLORIDE 0.9 % (FLUSH) 0.9 %
10 SYRINGE (ML) INJECTION PRN
Status: DISCONTINUED | OUTPATIENT
Start: 2020-02-05 | End: 2020-02-06

## 2020-02-05 RX ORDER — SODIUM CHLORIDE 9 MG/ML
250 INJECTION, SOLUTION INTRAVENOUS ONCE
Status: COMPLETED | OUTPATIENT
Start: 2020-02-05 | End: 2020-02-05

## 2020-02-05 RX ORDER — LIDOCAINE HYDROCHLORIDE 20 MG/ML
5 INJECTION, SOLUTION INFILTRATION; PERINEURAL ONCE
Status: COMPLETED | OUTPATIENT
Start: 2020-02-05 | End: 2020-02-05

## 2020-02-05 RX ADMIN — LIDOCAINE HYDROCHLORIDE 5 ML: 20 INJECTION, SOLUTION INFILTRATION; PERINEURAL at 09:14

## 2020-02-05 RX ADMIN — PIPERACILLIN AND TAZOBACTAM 3.38 G: 3; .375 INJECTION, POWDER, LYOPHILIZED, FOR SOLUTION INTRAVENOUS at 18:28

## 2020-02-05 RX ADMIN — Medication 10 ML: at 21:25

## 2020-02-05 RX ADMIN — PIPERACILLIN AND TAZOBACTAM 3.38 G: 3; .375 INJECTION, POWDER, LYOPHILIZED, FOR SOLUTION INTRAVENOUS at 10:00

## 2020-02-05 RX ADMIN — PIPERACILLIN AND TAZOBACTAM 3.38 G: 3; .375 INJECTION, POWDER, LYOPHILIZED, FOR SOLUTION INTRAVENOUS at 00:25

## 2020-02-05 RX ADMIN — SODIUM CHLORIDE: 9 INJECTION, SOLUTION INTRAVENOUS at 09:14

## 2020-02-05 RX ADMIN — DOCUSATE SODIUM 100 MG: 100 CAPSULE, LIQUID FILLED ORAL at 21:17

## 2020-02-05 RX ADMIN — OXYCODONE HYDROCHLORIDE 5 MG: 5 TABLET ORAL at 21:17

## 2020-02-05 RX ADMIN — DOCUSATE SODIUM 100 MG: 100 CAPSULE, LIQUID FILLED ORAL at 14:44

## 2020-02-05 RX ADMIN — VANCOMYCIN HYDROCHLORIDE 1500 MG: 5 INJECTION, POWDER, LYOPHILIZED, FOR SOLUTION INTRAVENOUS at 14:22

## 2020-02-05 RX ADMIN — Medication 10 ML: at 18:29

## 2020-02-05 RX ADMIN — SODIUM CHLORIDE 250 ML: 9 INJECTION, SOLUTION INTRAVENOUS at 17:57

## 2020-02-05 RX ADMIN — LIDOCAINE HYDROCHLORIDE 5 ML: 20 INJECTION, SOLUTION INFILTRATION; PERINEURAL at 17:58

## 2020-02-05 RX ADMIN — CEFAZOLIN SODIUM 2 G: 2 SOLUTION INTRAVENOUS at 03:23

## 2020-02-05 ASSESSMENT — PAIN SCALES - GENERAL
PAINLEVEL_OUTOF10: 0
PAINLEVEL_OUTOF10: 5
PAINLEVEL_OUTOF10: 0
PAINLEVEL_OUTOF10: 2

## 2020-02-05 NOTE — PROGRESS NOTES
Physical Therapy   Facility/Department: [facility] MED SURG [id number]    D/C'd current physical therapy orders- awaiting ortho clearance for OOB activity. New physical therapy Eval and Treat orders are required to resume PT when pt is stable and appropriate for out of bed activity. Sticky note written to physicians.     [address] [place] [address]) Physical Therapy Department    Electronically signed by Yosef Donato on 2/5/20 at 10:47 AM

## 2020-02-05 NOTE — OP NOTE
[address]                      [address], [address] [facility]                                OPERATIVE REPORT    PATIENT NAME: Kali Gray                   :        1980  MED REC NO:   [id number]                            ROOM:       W272  ACCOUNT NO:   [de-identified]                           ADMIT DATE: 2020  PROVIDER:     Jacob Guerrero MD    DATE OF PROCEDURE:  2020    LOCATION:  [facility]. PREOPERATIVE DIAGNOSIS:  Right hip abscess status post previous total  hip arthroplasty. POSTOPERATIVE DIAGNOSIS:  Right hip abscess status post previous total  hip arthroplasty. OPERATIONS PERFORMED:  Irrigation and debridement of deep right hip  abscess. SURGEON:  Yolanda Pérez MD    ASSISTANT:  Sylvia Griffin PA-C was present throughout the entire case. Given the nature of the disease process and the procedure, a skilled  surgical first assistant was necessary during the case. The assistant  was necessary to hold retractors and manipulate the extremity during the  procedure. A certified scrub tech was at the back table managing the  instruments and supplies for the surgical case. ANESTHESIA:  General endotracheal.    COMPLICATIONS:  None. ESTIMATED BLOOD LOSS:  Minimal.    SPECIMENS:  Superficial x2, deep x2. INDICATIONS:  This patient is a 68-year-old male status post previous  right total hip arthroplasty. He developed significant pain and  swelling about the right aspect of the hip. He was admitted to hospital  due to worsening pain and swelling. An abscess was identified on CT  imaging and the decision was made to proceed with open irrigation and  debridement. Risks and benefits of surgery were noted with the patient  and family. These include infection, stiffness, nerve damage, continued  pain as well as need for subsequent operations.   He certainly understood  the potential need for resection arthroplasty if there was deep  infection that is untreatable with antibiotics. Understanding these  options and limitations, he elected to proceed. OPERATIVE PROCEDURE:  Informed consent was obtained prior to arrival on  the operating room. Upon arrival, the patient was identified. He was  transported from a stretcher to operating table and placed in supine  position. A general endotracheal anesthetic and block were administered  by the Anesthesia staff. Prior to starting the case, the patient had  been receiving intravenous antibiotics on the floor. His right hip was  prepped and draped in the usual sterile fashion. An 8 cm incision was  made directly in line of the previous skin incision. There was palpable  abscess beneath the skin. The incision was carried through subcutaneous  tissue and a large amount of purulent material was discharged. Superficial cultures x2 were taken from the subcutaneous layer. There  was a large capsular layer as well, which was opened and appeared to be  encasing the abscess. This was then taken down the fascia. Thorough  debridement of the soft tissue was performed. There were several large  posterior pockets of infection as well. We could not find an apparent  track deep within the wound; however, there was obvious fluid beneath  the wound and tension as well. As such, after thorough irrigation and  debridement of the superficial layer, we opened the fascial layer as  well. This was followed down to the joint where purulent material was  again expressed. The capsule was opened to further allow drainage of  the capsule itself. Thorough debridement of the deep tissue was  performed with cultures x2 taken. The deep portion of the wound was  also copiously irrigated with sterile saline. After thorough  debridement, a deep drain was placed. The fascia was then repaired  using O Vicryl suture.   A superficial drain was then placed and the  subcutaneous tissue and skin were closed with 2-O Vicryl and staples. All sponge and needle counts were correct prior to the closure. A  sterile vacuum dressing was applied. He was awoken from his anesthetic  and taken to the recovery room in stable condition.         Claudia Dwyer MD    D: 02/04/2020 14:23:01       T: 02/04/2020 20:04:11     [id number]  Job#: [id number]     Doc#: [id number]    CC:

## 2020-02-05 NOTE — PROGRESS NOTES
2219: Patient is alert and oriented x4. Patient came in for right hip abscess with increasing pain, redness, and swelling. On 2/4 patient had irrigation and drainage of the right hip abscess. Patient now has Prevena and two hemovacs to the right hip. Prevena dressing is clean, dry, and intact, no drainage. Incision site is covered with foam tape and unable to visualized, no obvious drainage. Dressing is clean, dry, and intact. Patient reports moderate pain to the right hip. Patient does have slight numbness in the right thigh. Cap refill <3 seconds, palpable pulses, and full sensation to the RLE. Patient was medicated for a pain level of 6/10, pain exacerbates with movement. Patient stands at the bedside and uses the urinal. Patient has no further requests at this time. Will monitor.

## 2020-02-05 NOTE — PROGRESS NOTES
Hospitalist Daily Progress Note  Name: Addie Love  Age: 37 y.o. Gender: male  CodeStatus: Full Code  Allergies: Tramadol    Chief Complaint:No chief complaint on file. Primary Care Provider: Yvette Humphreys MD  InpatientTreatment Team: Treatment Team: Attending Provider: Dread Joseph MD; Consulting Physician: Chelle Cox DO; Consulting Physician: Fermin Ramos MD; Surgeon: Farooq Moore MD; Registered Nurse: Sonia Talley RN; : LLUVIA Gagnon; Surgeon: Dread Joseph MD; Patient Care Tech: Latrice Sesay; : Alo Campo RN; : Louis Guzman RN  Admission Date: 2/3/2020      Subjective: Pt is seen and evaluated at bedside. MSSA positive. Pain controlled. Ambulating in halls. Physical Exam  Constitutional:       Appearance: Normal appearance. HENT:      Head: Normocephalic and atraumatic. Mouth/Throat:      Mouth: Mucous membranes are moist.      Pharynx: Oropharynx is clear. Eyes:      Conjunctiva/sclera: Conjunctivae normal.      Pupils: Pupils are equal, round, and reactive to light. Cardiovascular:      Rate and Rhythm: Normal rate and regular rhythm. Pulmonary:      Effort: Pulmonary effort is normal.      Breath sounds: No wheezing, rhonchi or rales. Abdominal:      General: There is no distension. Tenderness: There is no abdominal tenderness. There is no guarding. Musculoskeletal:      Comments: Right hip  Wound vac in place. Neurological:      Mental Status: He is alert. Review of Systems   Reason unable to perform ROS: 14 point ros reviewed and negative except for as above.         Medications:  Reviewed    Infusion Medications:    sodium chloride      sodium chloride Stopped (02/05/20 0916)     Scheduled Medications:    sodium chloride flush  10 mL Intravenous 2 times per day    sodium chloride flush  10 mL Intravenous 2 times per day    vancomycin (VANCOCIN) intermittent dosing (placeholder)   Other RX Placeholder    vancomycin  15 mg/kg Intravenous Q12H    sodium chloride flush  10 mL Intravenous 2 times per day    docusate sodium  100 mg Oral BID    piperacillin-tazobactam  3.375 g Intravenous Q8H     PRN Meds: sodium chloride flush, sodium chloride flush, sodium chloride flush, oxyCODONE, morphine **OR** morphine, ondansetron, magnesium hydroxide, ondansetron, oxyCODONE, diazepam, acetaminophen    Labs:   Recent Labs     02/03/20 1918 02/04/20 0519 02/05/20  0509   WBC 9.9 9.8 13.9*   HGB 12.2* 12.9* 11.8*   HCT 37.7* 38.9* 36.5*   * 488* 441*     Recent Labs     02/04/20 0519 02/04/20  0904 02/05/20  0509    139 138   K 4.2 4.5 4.3    100 101   CO2 25 25 25   BUN 8 7 6   CREATININE 0.63* 0.62* 0.64*   CALCIUM 9.2 9.2 9.0     Recent Labs     02/03/20  1316   AST 22   ALT 12   BILITOT <0.2   ALKPHOS 106*     Recent Labs     02/03/20 1918   INR 1.1     No results for input(s): Davian Morrow in the last 72 hours. Urinalysis:   Lab Results   Component Value Date    NITRU Negative 02/03/2020    BLOODU Negative 02/03/2020    SPECGRAV 1.026 02/03/2020    GLUCOSEU Negative 02/03/2020       Radiology:   Most recent    Chest CT      WITH CONTRAST:No results found for this or any previous visit. WITHOUT CONTRAST: No results found for this or any previous visit. CXR      2-view: No results found for this or any previous visit. Portable: No results found for this or any previous visit. Echo No results found for this or any previous visit. Assessment/Plan:    Active Hospital Problems    Diagnosis Date Noted    Hip pain, acute, right [M25.551] 02/04/2020     Right Hip abscess vs seroma: plan for explantation. Follow final cultures. MSSA pending sensitivity. Continue zosyn. D/c Vanco.     Additional work up or/and treatment plan may be added today or then after based on clinical progression. I am managing a portion of pt care.  Some medical issues are handled byother specialists. Additional work up and treatment should be done in out pt setting by pt PCP and other out pt providers. In addition to examining and evaluating pt, I spent additional time explaining care, normaland abnormal findings, and treatment plan. All of pt questions were answered. Counseling, diet and education were provided. Case will be discussed with nursing staff when appropriate. Family will be updated if and whenappropriate.       Electronically signed by Raul Welch DO on 2/5/2020 at 2:37 PM

## 2020-02-05 NOTE — CARE COORDINATION
IV BENEFIT REQUEST FORM    FAX FROM: [facility]                        [address] [place] [place]    REQUESTED BY: Electronically signed by Masoud Lawrence RN on 2020 at 7:50 AM                                               RN/C3: PHONE: [phone]    DATE:/TIME OF REQUEST: 20  TIME: 7:50 AM      TO: [address]   Payor: [facility] Plan: [address] [place] [address]   Group Number: [id number] Insurance Type: [address] Name: Meenakshi Pennington : 1980   Subscriber ID: [id number]       Pat. Rel. to Subscriber: Self         FAX TO: [phone]    PHONE: [phone]     THIS PATIENT HAS BEEN IDENTIFIED TO POSSIBLY NEED LONG TERM IV'S. PLEASE CHECK INSURANCE COVERAGE FOR THE FOLLOWING PT/DRUGS. [facility]                              ROOM: [place] [address]   PATIENT'S : 1980  PATIENT ADDRESS: [address] [place] [address]      PAYOR NAME:  Payor: Jr Ferrera / Plan: Jr BUTTS PPO / Product Type: *No Product type* /     DRUG: VANCO                         DOSE: 1500mg            FREQUENCY: Q 12 HR    DRUG: ZOSYN                         DOSE: 3.375            FREQUENCY: Q 8 HR        __________ CHECK HERE IF PT HAS NO INSURANCE AND REQUESTING SELF PAY COST. *IF [facility] IS NOT A PROVIDER FOR THIS PATIENT, PLEASE FORWARD INFO VIA FAX TO [facility] @ [phone],(PHONE NUMBER: [phone]) TO RUN BENEFIT VERIFICATION AND NOTIFY THE ABOVE C3 OF THIS PLAN. (FAX FACE SHEET WITH DEMOGRAPHICS AND INSURANCE INFO WITH THIS FORM.)  PLEASE FAX BENEFIT INFO TO: THE [phone]    This message is intended only for the use of the individual or entity to which it is addressed and may contain information that is privileged, confidential, and exempt from disclosure under applicable law.  If the reader of the notice is not intended

## 2020-02-05 NOTE — CONSULTS
Infectious Diseases Inpatient Consult Note      Reason for Consult:   PJ infection  Requesting Physician:   Florentino Cole CNP  Primary Care Physician:  Lisa Oviedo MD  History Obtained From:   Pt, EPIC    Admit Date: 2/3/2020  Hospital Day: 3      HISTORY OF PRESENT ILLNESS:  This is a 37 y.o. male was admitted to [facility]  from home after he noted R hip swelling, redness, worsened over the past 2 weeks, no drainage, no fevers. Was found to have an abscess connecting with prosthetic joint. Had R THR 2 years ago. Has scratches on hands related to job. Going for hip explant in am. On Iv Vanco and Zosyn, well tolerated.      CHIEF COMPLAINT:       Past Medical History:   Diagnosis Date    Cancer [facility]) 1993    Hodgkins Lymphoma       Past Surgical History:   Procedure Laterality Date    HIP SURGERY Right 2/4/2020    RIGHT HIP  INCISION AND DRAINAGE performed by Gerhardt Grapes, MD at [facility] HIP ARTHROPLASTY Right 5/31/2018    RIGHT HIP TOTAL HIP ARTHROPLASTY LITTLE performed by Arjun Quintana MD at [address] [place] [address]         Current Medications:     sodium chloride flush  10 mL Intravenous 2 times per day    sodium chloride flush  10 mL Intravenous 2 times per day    vancomycin (VANCOCIN) intermittent dosing (placeholder)   Other RX Placeholder    vancomycin  15 mg/kg Intravenous Q12H    sodium chloride flush  10 mL Intravenous 2 times per day    docusate sodium  100 mg Oral BID    piperacillin-tazobactam  3.375 g Intravenous Q8H       Allergies:  Tramadol    Social History     Socioeconomic History    Marital status: Single     Spouse name: Not on file    Number of children: 2    Years of education: Not on file    Highest education level: Not on file   Occupational History    Occupation: maintenance    Social Needs    Financial resource strain: Not on file    Food insecurity:     Worry: Not on file     Inability: Not on file   Ruslan Herman with normal mucous membranes. No thrush  Lungs: normal respiratory effort, Clear Lungs, no rhonchi, no crackles, no wheezes  Heart:RRR, nl S1/S2, no murmur  Abdomen: soft, no tenderness, no H-S-megaly, + BS  NEUROLOGICAL: alert and oriented x 3, no focal deficits  No leg edema  No erythema, no warmth, no tenderness  R hip with intact VAC        DATA:    Lab Results   Component Value Date    WBC 13.9 (H) 02/05/2020    HGB 11.8 (L) 02/05/2020    HCT 36.5 (L) 02/05/2020    MCV 84.9 02/05/2020     (H) 02/05/2020     Lab Results   Component Value Date    CREATININE 0.64 (L) 02/05/2020    BUN 6 02/05/2020     02/05/2020    K 4.3 02/05/2020     02/05/2020    CO2 25 02/05/2020       Hepatic Function Panel:   Lab Results   Component Value Date    ALKPHOS 106 02/03/2020    ALT 12 02/03/2020    AST 22 02/03/2020    PROT 8.9 02/03/2020    BILITOT <0.2 02/03/2020    LABALBU 3.9 02/03/2020    LABALBU 3.8 05/06/2018       Microbiology:   Recent Labs     02/03/20  1624   BC No Growth to date. Any change in status will be called.          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

## 2020-02-05 NOTE — DISCHARGE INSTR - COC
Continuity of Care Form    Patient Name: Otoniel Em   :  1980  MRN:  [id number]    Admit date:  2/3/2020  Discharge date:  2/10/2020    Code Status Order: Full Code   Advance Directives:   [facility] Documentation     Date/Time Healthcare Directive Type of Healthcare Directive Copy in [facility] [address] Agent's Name Healthcare Agent's Phone Number    20 1156  No, patient does not have an advance directive for healthcare treatment -- -- -- -- --    20 1753  No, patient does not have an advance directive for healthcare treatment -- -- -- -- --          Admitting Physician:  Uri Joe MD  PCP: Bradley Whitlock MD    Discharging Nurse: PD  [address] Unit/Room#: H892/I398-10  Discharging Unit Phone Number: [phone]    Emergency Contact:   Extended Emergency Contact Information  Primary Emergency Contact: Ofelia Moulton  Address: [address] [place] [address] Phone: [phone]  Work Phone: [phone]  Mobile Phone: [phone]  Relation: Domestic Partner  Secondary Emergency Contact: Sanna Ramsey  Address: [address] Phone: [phone]  Mobile Phone: [phone]  Relation: Parent    Past Surgical History:  Past Surgical History:   Procedure Laterality Date    HIP SURGERY Right 2020    RIGHT HIP  INCISION AND DRAINAGE performed by Gissell Cotto MD at [address] [place] [address] Right 2018    RIGHT HIP TOTAL HIP ARTHROPLASTY LITTLE performed by Uri Joe MD at [facility]         Immunization History:   Immunization History   Administered Date(s) Administered    Tdap (Boostrix, Adacel) 10/23/2016       Active Problems:  Patient Active Problem List   Diagnosis Code    Hodgkin lymphoma ([facility] [address] C81.90    H/O splenectomy Z90.81    Regular IS  Oxygen Therapy:  is not on home oxygen therapy. Ventilator:    - No ventilator support    Rehab Therapies: Physical Therapy and Occupational Therapy  Weight Bearing Status/Restrictions: Partial weight bearing (30-50%) only on leg right leg  Other Medical Equipment (for information only, NOT a DME order):  walker, bath bench and hospital bed  Other Treatments: Pravena wound vac to right hip incision    Patient's personal belongings (please select all that are sent with patient):  None    RN SIGNATURE:  Electronically signed by Karlie Medina RN on 2/10/20 at 5:46 PM    CASE MANAGEMENT/SOCIAL WORK SECTION    Inpatient Status Date: ***    Readmission Risk Assessment Score:  Readmission Risk              Risk of Unplanned Readmission:        10           Discharging to Facility/ [address] [place] [address]  FAX  [phone]        / signature: Electronically signed by Jose Menendez RN on 2/10/20 at 4:39 PM    PHYSICIAN SECTION    Prognosis: {Prognosis:8944431757}    Condition at Discharge: [facility] Patient Condition:972998384}    Rehab Potential (if transferring to Rehab): {Prognosis:2745002214}    Recommended Labs or Other Treatments After Discharge: ***    Physician Certification: I certify the above information and transfer of Jessie Madrigal  is necessary for the continuing treatment of the diagnosis listed and that he requires {Admit to Appropriate Level of Care:53667} for {GREATER/LESS:105234156} 30 days.      Update Admission H&P: {CHP DME Changes in IOPFP:757966697}    PHYSICIAN SIGNATURE:  Electronically signed by Gamal Morel DO on 2/5/20 at 7:03 AM

## 2020-02-05 NOTE — FLOWSHEET NOTE
Ambulating in room, denies discomfort. Awaiting transport to specials for picc line change out vs replacement. Consent signed.

## 2020-02-05 NOTE — PROGRESS NOTES
[facility] Occupational Therapy      Date: 2020  Patient Name: Conor Cote        MRN: [id number]  Account: [de-identified]   : 1980  (37 y.o.)  [facility]    Chart reviewed, attempted OT at 56 for eval. Patient not seen 2° to:    Hold per Mary White CNP due to pending surgery 20. Will await new orders pending ortho clearance.     Electronically signed by SRI Aranda Asa on 2020 at 10:33 AM

## 2020-02-06 ENCOUNTER — ANESTHESIA (OUTPATIENT)
Dept: OPERATING ROOM | Age: 40
DRG: 464 | End: 2020-02-06
Payer: COMMERCIAL

## 2020-02-06 ENCOUNTER — APPOINTMENT (OUTPATIENT)
Dept: GENERAL RADIOLOGY | Age: 40
DRG: 464 | End: 2020-02-06
Attending: ORTHOPAEDIC SURGERY
Payer: COMMERCIAL

## 2020-02-06 VITALS — TEMPERATURE: 63.5 F | DIASTOLIC BLOOD PRESSURE: 48 MMHG | OXYGEN SATURATION: 100 % | SYSTOLIC BLOOD PRESSURE: 77 MMHG

## 2020-02-06 LAB
ANAEROBIC CULTURE: ABNORMAL
ANION GAP SERPL CALCULATED.3IONS-SCNC: 12 MEQ/L (ref 9–15)
BASOPHILS ABSOLUTE: 0.1 K/UL (ref 0–0.2)
BASOPHILS RELATIVE PERCENT: 0.8 %
BUN BLDV-MCNC: 12 MG/DL (ref 6–20)
CALCIUM SERPL-MCNC: 8.7 MG/DL (ref 8.5–9.9)
CHLORIDE BLD-SCNC: 100 MEQ/L (ref 95–107)
CO2: 26 MEQ/L (ref 20–31)
CREAT SERPL-MCNC: 0.67 MG/DL (ref 0.7–1.2)
CULTURE SURGICAL: ABNORMAL
EOSINOPHILS ABSOLUTE: 0.3 K/UL (ref 0–0.7)
EOSINOPHILS RELATIVE PERCENT: 2.6 %
GFR AFRICAN AMERICAN: >60
GFR NON-AFRICAN AMERICAN: >60
GLUCOSE BLD-MCNC: 93 MG/DL (ref 70–99)
GRAM STAIN RESULT: ABNORMAL
HCT VFR BLD CALC: 34.1 % (ref 42–52)
HEMOGLOBIN: 10.9 G/DL (ref 14–18)
LYMPHOCYTES ABSOLUTE: 2.4 K/UL (ref 1–4.8)
LYMPHOCYTES RELATIVE PERCENT: 21.1 %
MCH RBC QN AUTO: 27.1 PG (ref 27–31.3)
MCHC RBC AUTO-ENTMCNC: 32 % (ref 33–37)
MCV RBC AUTO: 84.7 FL (ref 80–100)
MONOCYTES ABSOLUTE: 1.4 K/UL (ref 0.2–0.8)
MONOCYTES RELATIVE PERCENT: 12.5 %
NEUTROPHILS ABSOLUTE: 7 K/UL (ref 1.4–6.5)
NEUTROPHILS RELATIVE PERCENT: 63 %
ORGANISM: ABNORMAL
PDW BLD-RTO: 15.3 % (ref 11.5–14.5)
PLATELET # BLD: 396 K/UL (ref 130–400)
POTASSIUM SERPL-SCNC: 4.2 MEQ/L (ref 3.4–4.9)
RBC # BLD: 4.02 M/UL (ref 4.7–6.1)
SODIUM BLD-SCNC: 138 MEQ/L (ref 135–144)
WBC # BLD: 11.1 K/UL (ref 4.8–10.8)

## 2020-02-06 PROCEDURE — 6370000000 HC RX 637 (ALT 250 FOR IP): Performed by: ORTHOPAEDIC SURGERY

## 2020-02-06 PROCEDURE — 6360000002 HC RX W HCPCS: Performed by: ORTHOPAEDIC SURGERY

## 2020-02-06 PROCEDURE — 2709999900 HC NON-CHARGEABLE SUPPLY: Performed by: ORTHOPAEDIC SURGERY

## 2020-02-06 PROCEDURE — 85025 COMPLETE CBC W/AUTO DIFF WBC: CPT

## 2020-02-06 PROCEDURE — 2580000003 HC RX 258: Performed by: INTERNAL MEDICINE

## 2020-02-06 PROCEDURE — 0QB60ZZ EXCISION OF RIGHT UPPER FEMUR, OPEN APPROACH: ICD-10-PCS | Performed by: ORTHOPAEDIC SURGERY

## 2020-02-06 PROCEDURE — 7100000000 HC PACU RECOVERY - FIRST 15 MIN: Performed by: ORTHOPAEDIC SURGERY

## 2020-02-06 PROCEDURE — 3700000000 HC ANESTHESIA ATTENDED CARE: Performed by: ORTHOPAEDIC SURGERY

## 2020-02-06 PROCEDURE — 3700000001 HC ADD 15 MINUTES (ANESTHESIA): Performed by: ORTHOPAEDIC SURGERY

## 2020-02-06 PROCEDURE — 2580000003 HC RX 258: Performed by: ORTHOPAEDIC SURGERY

## 2020-02-06 PROCEDURE — 0SP90JZ REMOVAL OF SYNTHETIC SUBSTITUTE FROM RIGHT HIP JOINT, OPEN APPROACH: ICD-10-PCS | Performed by: ORTHOPAEDIC SURGERY

## 2020-02-06 PROCEDURE — 6360000002 HC RX W HCPCS: Performed by: NURSE ANESTHETIST, CERTIFIED REGISTERED

## 2020-02-06 PROCEDURE — 6370000000 HC RX 637 (ALT 250 FOR IP): Performed by: NURSE PRACTITIONER

## 2020-02-06 PROCEDURE — 2580000003 HC RX 258: Performed by: ANESTHESIOLOGY

## 2020-02-06 PROCEDURE — 6360000002 HC RX W HCPCS: Performed by: ANESTHESIOLOGY

## 2020-02-06 PROCEDURE — 2500000003 HC RX 250 WO HCPCS: Performed by: ORTHOPAEDIC SURGERY

## 2020-02-06 PROCEDURE — C1776 JOINT DEVICE (IMPLANTABLE): HCPCS | Performed by: ORTHOPAEDIC SURGERY

## 2020-02-06 PROCEDURE — 80048 BASIC METABOLIC PNL TOTAL CA: CPT

## 2020-02-06 PROCEDURE — P9041 ALBUMIN (HUMAN),5%, 50ML: HCPCS | Performed by: NURSE ANESTHETIST, CERTIFIED REGISTERED

## 2020-02-06 PROCEDURE — 2500000003 HC RX 250 WO HCPCS: Performed by: NURSE ANESTHETIST, CERTIFIED REGISTERED

## 2020-02-06 PROCEDURE — 3600000004 HC SURGERY LEVEL 4 BASE: Performed by: ORTHOPAEDIC SURGERY

## 2020-02-06 PROCEDURE — 7100000001 HC PACU RECOVERY - ADDTL 15 MIN: Performed by: ORTHOPAEDIC SURGERY

## 2020-02-06 PROCEDURE — 6360000002 HC RX W HCPCS: Performed by: INTERNAL MEDICINE

## 2020-02-06 PROCEDURE — 73501 X-RAY EXAM HIP UNI 1 VIEW: CPT

## 2020-02-06 PROCEDURE — 0SR90EZ REPLACEMENT OF RIGHT HIP JOINT WITH ARTICULATING SPACER, OPEN APPROACH: ICD-10-PCS | Performed by: ORTHOPAEDIC SURGERY

## 2020-02-06 PROCEDURE — 99232 SBSQ HOSP IP/OBS MODERATE 35: CPT | Performed by: INTERNAL MEDICINE

## 2020-02-06 PROCEDURE — 1210000000 HC MED SURG R&B

## 2020-02-06 PROCEDURE — 3600000014 HC SURGERY LEVEL 4 ADDTL 15MIN: Performed by: ORTHOPAEDIC SURGERY

## 2020-02-06 DEVICE — CUP ACET OD54MM ID46MM GENTMYCN BASE REMEDY: Type: IMPLANTABLE DEVICE | Site: HIP | Status: FUNCTIONAL

## 2020-02-06 DEVICE — IMPLANTABLE DEVICE: Type: IMPLANTABLE DEVICE | Site: HIP | Status: FUNCTIONAL

## 2020-02-06 DEVICE — CEMENT BNE RADIOPAQUE FAST SET ACRYL RESIN HI VISC SIMPLEXHV: Type: IMPLANTABLE DEVICE | Site: HIP | Status: FUNCTIONAL

## 2020-02-06 RX ORDER — ASPIRIN 81 MG/1
81 TABLET ORAL 2 TIMES DAILY
Status: DISCONTINUED | OUTPATIENT
Start: 2020-02-06 | End: 2020-02-10 | Stop reason: HOSPADM

## 2020-02-06 RX ORDER — SODIUM CHLORIDE 0.9 % (FLUSH) 0.9 %
10 SYRINGE (ML) INJECTION EVERY 12 HOURS SCHEDULED
Status: DISCONTINUED | OUTPATIENT
Start: 2020-02-06 | End: 2020-02-06 | Stop reason: HOSPADM

## 2020-02-06 RX ORDER — CEFAZOLIN SODIUM 2 G/50ML
2 SOLUTION INTRAVENOUS EVERY 8 HOURS
Status: DISCONTINUED | OUTPATIENT
Start: 2020-02-06 | End: 2020-02-06 | Stop reason: SDUPTHER

## 2020-02-06 RX ORDER — MORPHINE SULFATE 4 MG/ML
4 INJECTION, SOLUTION INTRAMUSCULAR; INTRAVENOUS
Status: DISCONTINUED | OUTPATIENT
Start: 2020-02-06 | End: 2020-02-07

## 2020-02-06 RX ORDER — ONDANSETRON 2 MG/ML
4 INJECTION INTRAMUSCULAR; INTRAVENOUS
Status: DISCONTINUED | OUTPATIENT
Start: 2020-02-06 | End: 2020-02-06 | Stop reason: HOSPADM

## 2020-02-06 RX ORDER — MEPERIDINE HYDROCHLORIDE 25 MG/ML
12.5 INJECTION INTRAMUSCULAR; INTRAVENOUS; SUBCUTANEOUS EVERY 5 MIN PRN
Status: DISCONTINUED | OUTPATIENT
Start: 2020-02-06 | End: 2020-02-06 | Stop reason: HOSPADM

## 2020-02-06 RX ORDER — MAGNESIUM HYDROXIDE 1200 MG/15ML
LIQUID ORAL CONTINUOUS PRN
Status: COMPLETED | OUTPATIENT
Start: 2020-02-06 | End: 2020-02-06

## 2020-02-06 RX ORDER — SODIUM CHLORIDE 0.9 % (FLUSH) 0.9 %
10 SYRINGE (ML) INJECTION EVERY 12 HOURS SCHEDULED
Status: DISCONTINUED | OUTPATIENT
Start: 2020-02-06 | End: 2020-02-10 | Stop reason: HOSPADM

## 2020-02-06 RX ORDER — MIDAZOLAM HYDROCHLORIDE 1 MG/ML
INJECTION INTRAMUSCULAR; INTRAVENOUS PRN
Status: DISCONTINUED | OUTPATIENT
Start: 2020-02-06 | End: 2020-02-06 | Stop reason: SDUPTHER

## 2020-02-06 RX ORDER — METOCLOPRAMIDE HYDROCHLORIDE 5 MG/ML
10 INJECTION INTRAMUSCULAR; INTRAVENOUS
Status: DISCONTINUED | OUTPATIENT
Start: 2020-02-06 | End: 2020-02-06 | Stop reason: HOSPADM

## 2020-02-06 RX ORDER — OXYCODONE HYDROCHLORIDE 5 MG/1
5 TABLET ORAL EVERY 4 HOURS PRN
Status: DISCONTINUED | OUTPATIENT
Start: 2020-02-06 | End: 2020-02-07

## 2020-02-06 RX ORDER — MORPHINE SULFATE 2 MG/ML
2 INJECTION, SOLUTION INTRAMUSCULAR; INTRAVENOUS
Status: DISCONTINUED | OUTPATIENT
Start: 2020-02-06 | End: 2020-02-07

## 2020-02-06 RX ORDER — PROPOFOL 10 MG/ML
INJECTION, EMULSION INTRAVENOUS CONTINUOUS PRN
Status: DISCONTINUED | OUTPATIENT
Start: 2020-02-06 | End: 2020-02-06 | Stop reason: SDUPTHER

## 2020-02-06 RX ORDER — DOCUSATE SODIUM 100 MG/1
100 CAPSULE, LIQUID FILLED ORAL 2 TIMES DAILY
Status: DISCONTINUED | OUTPATIENT
Start: 2020-02-06 | End: 2020-02-10 | Stop reason: HOSPADM

## 2020-02-06 RX ORDER — SODIUM CHLORIDE 0.9 % (FLUSH) 0.9 %
10 SYRINGE (ML) INJECTION PRN
Status: DISCONTINUED | OUTPATIENT
Start: 2020-02-06 | End: 2020-02-07

## 2020-02-06 RX ORDER — ALBUMIN, HUMAN INJ 5% 5 %
SOLUTION INTRAVENOUS PRN
Status: DISCONTINUED | OUTPATIENT
Start: 2020-02-06 | End: 2020-02-06 | Stop reason: SDUPTHER

## 2020-02-06 RX ORDER — CEFAZOLIN SODIUM 2 G/50ML
2 SOLUTION INTRAVENOUS EVERY 8 HOURS
Status: COMPLETED | OUTPATIENT
Start: 2020-02-06 | End: 2020-02-07

## 2020-02-06 RX ORDER — SODIUM CHLORIDE, SODIUM LACTATE, POTASSIUM CHLORIDE, CALCIUM CHLORIDE 600; 310; 30; 20 MG/100ML; MG/100ML; MG/100ML; MG/100ML
INJECTION, SOLUTION INTRAVENOUS CONTINUOUS
Status: DISCONTINUED | OUTPATIENT
Start: 2020-02-06 | End: 2020-02-06

## 2020-02-06 RX ORDER — SODIUM CHLORIDE 0.9 % (FLUSH) 0.9 %
10 SYRINGE (ML) INJECTION PRN
Status: DISCONTINUED | OUTPATIENT
Start: 2020-02-06 | End: 2020-02-06 | Stop reason: HOSPADM

## 2020-02-06 RX ORDER — SODIUM CHLORIDE, SODIUM LACTATE, POTASSIUM CHLORIDE, CALCIUM CHLORIDE 600; 310; 30; 20 MG/100ML; MG/100ML; MG/100ML; MG/100ML
INJECTION, SOLUTION INTRAVENOUS
Status: DISPENSED
Start: 2020-02-06 | End: 2020-02-07

## 2020-02-06 RX ORDER — ONDANSETRON 2 MG/ML
4 INJECTION INTRAMUSCULAR; INTRAVENOUS EVERY 6 HOURS PRN
Status: DISCONTINUED | OUTPATIENT
Start: 2020-02-06 | End: 2020-02-10 | Stop reason: HOSPADM

## 2020-02-06 RX ORDER — MAGNESIUM HYDROXIDE 1200 MG/15ML
LIQUID ORAL PRN
Status: DISCONTINUED | OUTPATIENT
Start: 2020-02-06 | End: 2020-02-06 | Stop reason: ALTCHOICE

## 2020-02-06 RX ORDER — VANCOMYCIN HYDROCHLORIDE 1 G/20ML
INJECTION, POWDER, LYOPHILIZED, FOR SOLUTION INTRAVENOUS PRN
Status: DISCONTINUED | OUTPATIENT
Start: 2020-02-06 | End: 2020-02-06 | Stop reason: ALTCHOICE

## 2020-02-06 RX ORDER — ACETAMINOPHEN 325 MG/1
650 TABLET ORAL EVERY 6 HOURS
Status: DISCONTINUED | OUTPATIENT
Start: 2020-02-06 | End: 2020-02-10 | Stop reason: HOSPADM

## 2020-02-06 RX ORDER — SODIUM CHLORIDE 9 MG/ML
INJECTION, SOLUTION INTRAVENOUS CONTINUOUS
Status: DISCONTINUED | OUTPATIENT
Start: 2020-02-06 | End: 2020-02-07

## 2020-02-06 RX ORDER — GLYCOPYRROLATE 1 MG/5 ML
SYRINGE (ML) INTRAVENOUS PRN
Status: DISCONTINUED | OUTPATIENT
Start: 2020-02-06 | End: 2020-02-06 | Stop reason: SDUPTHER

## 2020-02-06 RX ORDER — LIDOCAINE HYDROCHLORIDE 10 MG/ML
1 INJECTION, SOLUTION EPIDURAL; INFILTRATION; INTRACAUDAL; PERINEURAL
Status: DISCONTINUED | OUTPATIENT
Start: 2020-02-06 | End: 2020-02-06 | Stop reason: HOSPADM

## 2020-02-06 RX ORDER — TOBRAMYCIN 1.2 G/30ML
INJECTION, POWDER, LYOPHILIZED, FOR SOLUTION INTRAVENOUS PRN
Status: DISCONTINUED | OUTPATIENT
Start: 2020-02-06 | End: 2020-02-06 | Stop reason: ALTCHOICE

## 2020-02-06 RX ORDER — HYDROCODONE BITARTRATE AND ACETAMINOPHEN 5; 325 MG/1; MG/1
2 TABLET ORAL PRN
Status: DISCONTINUED | OUTPATIENT
Start: 2020-02-06 | End: 2020-02-06 | Stop reason: HOSPADM

## 2020-02-06 RX ORDER — HYDROCODONE BITARTRATE AND ACETAMINOPHEN 5; 325 MG/1; MG/1
1 TABLET ORAL PRN
Status: DISCONTINUED | OUTPATIENT
Start: 2020-02-06 | End: 2020-02-06 | Stop reason: HOSPADM

## 2020-02-06 RX ORDER — SENNA AND DOCUSATE SODIUM 50; 8.6 MG/1; MG/1
1 TABLET, FILM COATED ORAL 2 TIMES DAILY
Status: DISCONTINUED | OUTPATIENT
Start: 2020-02-06 | End: 2020-02-10 | Stop reason: HOSPADM

## 2020-02-06 RX ORDER — FENTANYL CITRATE 50 UG/ML
50 INJECTION, SOLUTION INTRAMUSCULAR; INTRAVENOUS EVERY 10 MIN PRN
Status: DISCONTINUED | OUTPATIENT
Start: 2020-02-06 | End: 2020-02-06 | Stop reason: HOSPADM

## 2020-02-06 RX ORDER — WOUND DRESSING ADHESIVE - LIQUID
LIQUID MISCELLANEOUS PRN
Status: DISCONTINUED | OUTPATIENT
Start: 2020-02-06 | End: 2020-02-06 | Stop reason: ALTCHOICE

## 2020-02-06 RX ORDER — LIDOCAINE HYDROCHLORIDE 20 MG/ML
INJECTION, SOLUTION INTRAVENOUS PRN
Status: DISCONTINUED | OUTPATIENT
Start: 2020-02-06 | End: 2020-02-06 | Stop reason: SDUPTHER

## 2020-02-06 RX ORDER — DIPHENHYDRAMINE HYDROCHLORIDE 50 MG/ML
12.5 INJECTION INTRAMUSCULAR; INTRAVENOUS
Status: DISCONTINUED | OUTPATIENT
Start: 2020-02-06 | End: 2020-02-06 | Stop reason: HOSPADM

## 2020-02-06 RX ADMIN — HYDROMORPHONE HYDROCHLORIDE 0.5 MG: 1 INJECTION, SOLUTION INTRAMUSCULAR; INTRAVENOUS; SUBCUTANEOUS at 18:47

## 2020-02-06 RX ADMIN — FENTANYL CITRATE 50 MCG: 50 INJECTION, SOLUTION INTRAMUSCULAR; INTRAVENOUS at 18:01

## 2020-02-06 RX ADMIN — ALBUMIN (HUMAN) 500 ML: 12.5 INJECTION, SOLUTION INTRAVENOUS at 14:40

## 2020-02-06 RX ADMIN — PHENYLEPHRINE HYDROCHLORIDE 100 MCG: 10 INJECTION INTRAVENOUS at 14:28

## 2020-02-06 RX ADMIN — SODIUM CHLORIDE: 9 INJECTION, SOLUTION INTRAVENOUS at 20:26

## 2020-02-06 RX ADMIN — PHENYLEPHRINE HYDROCHLORIDE 100 MCG: 10 INJECTION INTRAVENOUS at 14:16

## 2020-02-06 RX ADMIN — SODIUM CHLORIDE, POTASSIUM CHLORIDE, SODIUM LACTATE AND CALCIUM CHLORIDE: 600; 310; 30; 20 INJECTION, SOLUTION INTRAVENOUS at 12:07

## 2020-02-06 RX ADMIN — MORPHINE SULFATE 4 MG: 4 INJECTION, SOLUTION INTRAMUSCULAR; INTRAVENOUS at 23:26

## 2020-02-06 RX ADMIN — ALBUMIN (HUMAN) 500 ML: 12.5 INJECTION, SOLUTION INTRAVENOUS at 17:49

## 2020-02-06 RX ADMIN — PHENYLEPHRINE HYDROCHLORIDE 100 MCG: 10 INJECTION INTRAVENOUS at 14:52

## 2020-02-06 RX ADMIN — CEFAZOLIN SODIUM 2 G: 2 SOLUTION INTRAVENOUS at 20:26

## 2020-02-06 RX ADMIN — FENTANYL CITRATE 50 MCG: 50 INJECTION, SOLUTION INTRAMUSCULAR; INTRAVENOUS at 18:17

## 2020-02-06 RX ADMIN — DIAZEPAM 5 MG: 5 TABLET ORAL at 21:29

## 2020-02-06 RX ADMIN — PIPERACILLIN AND TAZOBACTAM 3.38 G: 3; .375 INJECTION, POWDER, LYOPHILIZED, FOR SOLUTION INTRAVENOUS at 02:46

## 2020-02-06 RX ADMIN — DOCUSATE SODIUM 100 MG: 100 CAPSULE, LIQUID FILLED ORAL at 20:27

## 2020-02-06 RX ADMIN — ACETAMINOPHEN 650 MG: 325 TABLET ORAL at 20:27

## 2020-02-06 RX ADMIN — LIDOCAINE HYDROCHLORIDE 40 MG: 20 INJECTION, SOLUTION INTRAVENOUS at 13:47

## 2020-02-06 RX ADMIN — ALBUMIN (HUMAN) 500 ML: 12.5 INJECTION, SOLUTION INTRAVENOUS at 14:14

## 2020-02-06 RX ADMIN — Medication 10 ML: at 20:30

## 2020-02-06 RX ADMIN — PHENYLEPHRINE HYDROCHLORIDE 100 MCG: 10 INJECTION INTRAVENOUS at 14:53

## 2020-02-06 RX ADMIN — Medication 0.2 MG: at 14:27

## 2020-02-06 RX ADMIN — MORPHINE SULFATE 4 MG: 4 INJECTION, SOLUTION INTRAMUSCULAR; INTRAVENOUS at 20:20

## 2020-02-06 RX ADMIN — HYDROMORPHONE HYDROCHLORIDE 0.5 MG: 1 INJECTION, SOLUTION INTRAMUSCULAR; INTRAVENOUS; SUBCUTANEOUS at 18:27

## 2020-02-06 RX ADMIN — SODIUM CHLORIDE, POTASSIUM CHLORIDE, SODIUM LACTATE AND CALCIUM CHLORIDE 125 ML/HR: 600; 310; 30; 20 INJECTION, SOLUTION INTRAVENOUS at 18:00

## 2020-02-06 RX ADMIN — PHENYLEPHRINE HYDROCHLORIDE 100 MCG: 10 INJECTION INTRAVENOUS at 14:46

## 2020-02-06 RX ADMIN — SODIUM CHLORIDE, POTASSIUM CHLORIDE, SODIUM LACTATE AND CALCIUM CHLORIDE: 600; 310; 30; 20 INJECTION, SOLUTION INTRAVENOUS at 15:07

## 2020-02-06 RX ADMIN — PHENYLEPHRINE HYDROCHLORIDE 100 MCG: 10 INJECTION INTRAVENOUS at 14:04

## 2020-02-06 RX ADMIN — PHENYLEPHRINE HYDROCHLORIDE 100 MCG: 10 INJECTION INTRAVENOUS at 15:27

## 2020-02-06 RX ADMIN — Medication 10 ML: at 20:29

## 2020-02-06 RX ADMIN — Medication 0.2 MG: at 14:39

## 2020-02-06 RX ADMIN — Medication 10 ML: at 09:39

## 2020-02-06 RX ADMIN — PIPERACILLIN AND TAZOBACTAM 3.38 G: 3; .375 INJECTION, POWDER, LYOPHILIZED, FOR SOLUTION INTRAVENOUS at 09:39

## 2020-02-06 RX ADMIN — SENNOSIDES AND DOCUSATE SODIUM 1 TABLET: 8.6; 5 TABLET ORAL at 20:27

## 2020-02-06 RX ADMIN — SODIUM CHLORIDE, POTASSIUM CHLORIDE, SODIUM LACTATE AND CALCIUM CHLORIDE 250 ML/HR: 600; 310; 30; 20 INJECTION, SOLUTION INTRAVENOUS at 17:24

## 2020-02-06 RX ADMIN — PHENYLEPHRINE HYDROCHLORIDE 200 MCG: 10 INJECTION INTRAVENOUS at 13:52

## 2020-02-06 RX ADMIN — Medication 0.2 MG: at 14:37

## 2020-02-06 RX ADMIN — OXYCODONE HYDROCHLORIDE 5 MG: 5 TABLET ORAL at 21:30

## 2020-02-06 RX ADMIN — FENTANYL CITRATE 50 MCG: 50 INJECTION, SOLUTION INTRAMUSCULAR; INTRAVENOUS at 17:16

## 2020-02-06 RX ADMIN — PHENYLEPHRINE HYDROCHLORIDE 100 MCG: 10 INJECTION INTRAVENOUS at 13:59

## 2020-02-06 RX ADMIN — SODIUM CHLORIDE, POTASSIUM CHLORIDE, SODIUM LACTATE AND CALCIUM CHLORIDE 300 ML/HR: 600; 310; 30; 20 INJECTION, SOLUTION INTRAVENOUS at 17:26

## 2020-02-06 RX ADMIN — MIDAZOLAM HYDROCHLORIDE 2 MG: 2 INJECTION, SOLUTION INTRAMUSCULAR; INTRAVENOUS at 13:35

## 2020-02-06 RX ADMIN — PROPOFOL 120 MCG/KG/MIN: 10 INJECTION, EMULSION INTRAVENOUS at 13:47

## 2020-02-06 RX ADMIN — CEFAZOLIN SODIUM 2 G: 2 SOLUTION INTRAVENOUS at 13:50

## 2020-02-06 ASSESSMENT — PULMONARY FUNCTION TESTS
PIF_VALUE: 0
PIF_VALUE: 1
PIF_VALUE: 0

## 2020-02-06 ASSESSMENT — PAIN SCALES - GENERAL
PAINLEVEL_OUTOF10: 0
PAINLEVEL_OUTOF10: 8
PAINLEVEL_OUTOF10: 0
PAINLEVEL_OUTOF10: 9
PAINLEVEL_OUTOF10: 9
PAINLEVEL_OUTOF10: 8
PAINLEVEL_OUTOF10: 9
PAINLEVEL_OUTOF10: 8
PAINLEVEL_OUTOF10: 8
PAINLEVEL_OUTOF10: 9
PAINLEVEL_OUTOF10: 1
PAINLEVEL_OUTOF10: 1
PAINLEVEL_OUTOF10: 8
PAINLEVEL_OUTOF10: 9

## 2020-02-06 ASSESSMENT — PAIN DESCRIPTION - LOCATION: LOCATION: HIP

## 2020-02-06 ASSESSMENT — PAIN DESCRIPTION - PAIN TYPE
TYPE: SURGICAL PAIN
TYPE: SURGICAL PAIN

## 2020-02-06 ASSESSMENT — PAIN DESCRIPTION - PROGRESSION: CLINICAL_PROGRESSION: NOT CHANGED

## 2020-02-06 ASSESSMENT — PAIN DESCRIPTION - ORIENTATION: ORIENTATION: RIGHT

## 2020-02-06 ASSESSMENT — PAIN DESCRIPTION - DESCRIPTORS: DESCRIPTORS: CONSTANT

## 2020-02-06 NOTE — PROGRESS NOTES
Portable R hip film at bedside as per order. Patient ronal fairly well. Very restless. Much reassurance / support is being provided. Attempting to secure BP readings.

## 2020-02-06 NOTE — PROGRESS NOTES
Infectious Diseases Inpatient Progress Note          HISTORY OF PRESENT ILLNESS:  Follow up R hip abscess, PJI on IV Vanco and Zosyn, well tolerated. Patient has no pain, no fever. For PJ explant. Current Medications:     ceFAZolin  2 g Intravenous Q8H    sodium chloride flush  10 mL Intravenous 2 times per day    alteplase  1 mg Intracatheter Once    alteplase  1 mg Intracatheter Once    sodium chloride flush  10 mL Intravenous 2 times per day    docusate sodium  100 mg Oral BID       Allergies:  Tramadol      Review of Systems  14 system review is negative other than HPI    Physical Exam  Vitals:    02/05/20 0731 02/05/20 2118 02/06/20 0730 02/06/20 1145   BP: 111/60 112/62 105/62 128/66   Pulse: 71 83 84 87   Resp: 18 16  16   Temp: 98.2 °F (36.8 °C) 99.1 °F (37.3 °C) 98.8 °F (37.1 °C) 99.8 °F (37.7 °C)   TempSrc: Oral Oral  Temporal   SpO2: 99% 96% 97% 97%   Weight:       Height:         General Appearance: alert and oriented to person, place and time, well-developed and well-nourished, in no acute distress  Skin: warm and dry, no rash. Head: normocephalic and atraumatic  Eyes: anicteric sclerae  ENT: oropharynx clear and moist with normal mucous membranes.  No oral thrush  Lungs: normal respiratory effort  Abdomen: soft, no tenderness  No leg edema  No erythema, no tenderness  Intact R hip VAC    DATA:    Lab Results   Component Value Date    WBC 11.1 (H) 02/06/2020    HGB 10.9 (L) 02/06/2020    HCT 34.1 (L) 02/06/2020    MCV 84.7 02/06/2020     02/06/2020     Lab Results   Component Value Date    CREATININE 0.67 (L) 02/06/2020    BUN 12 02/06/2020     02/06/2020    K 4.2 02/06/2020     02/06/2020    CO2 26 02/06/2020       Hepatic Function Panel:  Lab Results   Component Value Date    ALKPHOS 106 02/03/2020    ALT 12 02/03/2020    AST 22 02/03/2020    PROT 8.9 02/03/2020    BILITOT <0.2 02/03/2020    LABALBU 3.9 02/03/2020    LABJOAO 3.8 05/06/2018       Microbiology:   Recent Labs     02/03/20  1624   BC No Growth to date. Any change in status will be called.      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

## 2020-02-06 NOTE — ANESTHESIA POSTPROCEDURE EVALUATION
Department of Anesthesiology  Postprocedure Note    Patient: Raghav Cabrera  MRN: [id number]  YOB: 1980  Date of evaluation: 2/6/2020  Time:  4:46 PM     Procedure Summary     Date:  02/06/20 Room / Location:  [facility] OR 10 / [facility]    Anesthesia Start:  1335 Anesthesia Stop:      Procedure:  RIGHT HIP TOTAL ARTHROPLASTY removal of implant  and placement of antibiotic spacer, SUMIT CUP OSTEOTOME, LITTLE ARTICULATED SPACER, SYNTHES CABLES, ROOM 272 (Right ) Diagnosis:  (infected MITZI- right)    Surgeon:  Rohini Sanz MD Responsible Provider:  Shira Medrano MD    Anesthesia Type:  spinal ASA Status:  3          Anesthesia Type: spinal    Fausto Phase I: Fuasto Score: 10    Fausto Phase II:      Last vitals: Reviewed and per EMR flowsheets.        Anesthesia Post Evaluation    Patient location during evaluation: bedside  Patient participation: complete - patient participated  Level of consciousness: awake and awake and alert  Pain score: 0  Airway patency: patent  Nausea & Vomiting: no nausea and no vomiting  Complications: no  Cardiovascular status: blood pressure returned to baseline and hemodynamically stable  Respiratory status: acceptable  Hydration status: euvolemic

## 2020-02-06 NOTE — PLAN OF CARE
Problem: Pain:  Goal: Pain level will decrease  Description  Pain level will decrease  Outcome: Ongoing  Goal: Control of acute pain  Description  Control of acute pain  Outcome: Ongoing  Goal: Control of chronic pain  Description  Control of chronic pain  Outcome: Ongoing     Problem: Mobility - Impaired:  Goal: Mobility will improve  Description  Mobility will improve  Outcome: Ongoing

## 2020-02-06 NOTE — ANESTHESIA PROCEDURE NOTES
Spinal Block    Patient location during procedure: pre-op  Reason for block: primary anesthetic  Staffing  Anesthesiologist: Vin Fonseca MD  Performed: anesthesiologist   Preanesthetic Checklist  Completed: patient identified, site marked, surgical consent, pre-op evaluation, timeout performed, IV checked, risks and benefits discussed, monitors and equipment checked, anesthesia consent given, oxygen available and patient being monitored  Spinal Block  Patient position: sitting  Prep: Betadine  Patient monitoring: cardiac monitor, continuous pulse ox and frequent blood pressure checks  Approach: midline  Location: L3/L4  Provider prep: mask and sterile gloves  Local infiltration: lidocaine  Agent: bupivacaine  Dose: 2  Dose: 2  Needle  Needle type: Pencan   Needle gauge: 24 G  Assessment  Events: cerebrospinal fluid  Swirl obtained: Yes  CSF: clear  Attempts: 1  Hemodynamics: stable

## 2020-02-06 NOTE — PROGRESS NOTES
Dr. Melina Sykes, updated on BP readings; at bedside now administering IV albumin. Patient has remained awake, alert & O throughout this PACU care. Color slightlly pale; cool & dry.

## 2020-02-06 NOTE — PROGRESS NOTES
Albumin fully absorbed & d/c at this time. Wife vs at bedside. Reassurance offered. Dr. Charlene Izaguirre, Jeff, vs.  Update given. Ok to administer hydromorphone; fentanyl has provided no relief of pain.

## 2020-02-06 NOTE — BRIEF OP NOTE
Brief Postoperative Note  ______________________________________________________________    Patient: Alexa Mcdaniels  YOB: 1980  MRN: [id number]  Date of Procedure: 2/6/2020    Pre-Op Diagnosis: infected MITZI- right    Post-Op Diagnosis: Same       Procedure(s):  RIGHT HIP TOTAL ARTHROPLASTY removal of implant  and placement of antibiotic spacer, SUMIT CUP OSTEOTOME, LITTLE ARTICULATED SPACER, SYNTHES CABLES, ROOM 272    Anesthesia: Spinal    Surgeon(s): Arjun Quintana MD    Assistant: Leonetta Hatchet    Estimated Blood Loss (mL): 673    Complications: None    Specimens:   * No specimens in log *    Implants:  Implant Name Type Inv. Item Serial No.  Lot No. LRB No. Used   IMPL CEMENT SIMPLEX HV Shoulder/Arm/Wrist/Hand IMPL CEMENT SIMPLEX HV  LITTLE: ORTHOPAEDICS [id number] Right 2   IMPL HIP REMEDY ACETABULAR CUP Hip IMPL HIP REMEDY ACETABULAR CUP  OSTEOREMEDIES [id number] Right 1   REMEDY SPECTRUM MODULAR FEMORAL LONG STEM     [id number] Right 1         Drains:   Negative Pressure Wound Therapy Hip Left;Posterior (Active)       [REMOVED] Closed/Suction Drain Right;Lateral Hip [address] [place] (Removed)   Site Description Unable to view 2/5/2020  5:17 AM   Dressing Status Clean;Dry; Intact 2/5/2020  5:17 AM   Drainage Appearance None 2/5/2020  5:17 AM   Status Compressed 2/5/2020  5:17 AM   Output (ml) 30 ml 2/5/2020 12:33 PM       [REMOVED] Closed/Suction Drain Right;Lateral Hip [address] 15 Citizen [place] (Removed)   Site Description Unable to view 2/5/2020  5:17 AM   Dressing Status Clean;Dry; Intact 2/5/2020  5:17 AM   Drainage Appearance Bloody 2/5/2020  5:17 AM   Status Compressed 2/5/2020  5:17 AM   Output (ml) 20 ml 2/5/2020 12:33 PM       Findings: infection    Arjun Quintana MD  Date: 2/6/2020  Time: 4:07 PM

## 2020-02-06 NOTE — ANESTHESIA PRE PROCEDURE
Department of Anesthesiology  Preprocedure Note       Name:  Reymundo Mohan   Age:  37 y.o.  :  1980                                          MRN:  [id number]         Date:  2020      Surgeon: Owen Perez):   Jacquelin Murry MD    Procedure: RIGHT HIP TOTAL ARTHROPLASTY removal of implant  and placement of antibiotic spacer, SUMIT CUP OSTEOTOME, LITTLE ARTICULATED SPACER, SYNTHES CABLES, ROOM 272 (Right )    Medications prior to admission:   Prior to Admission medications    Not on File       Current medications:    Current Facility-Administered Medications   Medication Dose Route Frequency Provider Last Rate Last Dose    ceFAZolin (ANCEF) 2 g in dextrose 3 % 50 mL IVPB (duplex)  2 g Intravenous Q8H Catrachito Alaniz MD        lactated ringers infusion   Intravenous Continuous Marciano Ramirez  mL/hr at 20 1207      fentaNYL (SUBLIMAZE) injection 50 mcg  50 mcg Intravenous Q10 Min PRN Marciano Ramirez MD        HYDROmorphone (DILAUDID) injection 0.5 mg  0.5 mg Intravenous Q10 Min PRN Marciano Ramirez MD        HYDROcodone-acetaminophen [facility]) 5-325 MG per tablet 1 tablet  1 tablet Oral PRN Marciano Ramirez MD        Or    HYDROcodone-acetaminophen [facility]) 5-325 MG per tablet 2 tablet  2 tablet Oral PRN Marciano Ramirez MD        diphenhydrAMINE (BENADRYL) injection 12.5 mg  12.5 mg Intravenous Once PRN Marciano Ramirez MD        ondansetron [facility]) injection 4 mg  4 mg Intravenous Once PRN Marciano Ramirez MD        metoclopramide [facility]) injection 10 mg  10 mg Intravenous Once PRN Marciano Ramirez MD        meperidine (DEMEROL) injection 12.5 mg  12.5 mg Intravenous Q5 Min PRN Marciano Ramirez MD        lactated ringers infusion   Intravenous Continuous Marciano Ramirez MD        sodium chloride flush 0.9 % injection 10 mL  10 mL Intravenous 2 times per day Marciano Ramirez MD        sodium chloride flush 0.9 % HCT 34.1 02/06/2020    MCV 84.7 02/06/2020    RDW 15.3 02/06/2020     02/06/2020       CMP:   Lab Results   Component Value Date     02/06/2020    K 4.2 02/06/2020    K 4.5 02/04/2020     02/06/2020    CO2 26 02/06/2020    BUN 12 02/06/2020    CREATININE 0.67 02/06/2020    GFRAA >60.0 02/06/2020    AGRATIO 0.8 05/06/2018    LABGLOM >60.0 02/06/2020    GLUCOSE 93 02/06/2020    GLUCOSE 100 05/06/2018    PROT 8.9 02/03/2020    CALCIUM 8.7 02/06/2020    BILITOT <0.2 02/03/2020    ALKPHOS 106 02/03/2020    AST 22 02/03/2020    ALT 12 02/03/2020       POC Tests: No results for input(s): POCGLU, POCNA, POCK, POCCL, POCBUN, POCHEMO, POCHCT in the last 72 hours. Coags:   Lab Results   Component Value Date    PROTIME 15.0 02/03/2020    INR 1.1 02/03/2020    APTT 34.8 02/03/2020       HCG (If Applicable): No results found for: PREGTESTUR, PREGSERUM, HCG, HCGQUANT     ABGs: No results found for: PHART, PO2ART, TAS6JXN, ZWG2KRX, BEART, D1UHTBSB     Type & Screen (If Applicable):  No results found for: LABABO, [address]    Anesthesia Evaluation  Patient summary reviewed and Nursing notes reviewed no history of anesthetic complications:   Airway: Mallampati: I  TM distance: >3 FB   Neck ROM: full  Mouth opening: > = 3 FB Dental: normal exam         Pulmonary:Negative Pulmonary ROS and normal exam                               Cardiovascular:Negative CV ROS                      Neuro/Psych:   Negative Neuro/Psych ROS              GI/Hepatic/Renal: Neg GI/Hepatic/Renal ROS            Endo/Other: Negative Endo/Other ROS                    Abdominal:           Vascular: negative vascular ROS. Anesthesia Plan      spinal     ASA 3           MIPS: Prophylactic antiemetics administered. Anesthetic plan and risks discussed with patient. Plan discussed with CRNA.     Attending anesthesiologist reviewed and agrees with Salty Cobb MD

## 2020-02-06 NOTE — PROGRESS NOTES
Intravenous Q8H     PRN Meds: sodium chloride flush, oxyCODONE, morphine **OR** morphine, magnesium hydroxide, ondansetron, oxyCODONE, diazepam, acetaminophen    Labs:   Recent Labs     02/04/20  0519 02/05/20  0509 02/06/20  0600   WBC 9.8 13.9* 11.1*   HGB 12.9* 11.8* 10.9*   HCT 38.9* 36.5* 34.1*   * 441* 396     Recent Labs     02/04/20  0904 02/05/20  0509 02/06/20  0600    138 138   K 4.5 4.3 4.2    101 100   CO2 25 25 26   BUN 7 6 12   CREATININE 0.62* 0.64* 0.67*   CALCIUM 9.2 9.0 8.7     Recent Labs     02/03/20  1316   AST 22   ALT 12   BILITOT <0.2   ALKPHOS 106*     Recent Labs     02/03/20  1918   INR 1.1     No results for input(s): Willy Cantrell in the last 72 hours. Urinalysis:   Lab Results   Component Value Date    NITRU Negative 02/03/2020    BLOODU Negative 02/03/2020    SPECGRAV 1.026 02/03/2020    GLUCOSEU Negative 02/03/2020       Radiology:   Most recent    Chest CT      WITH CONTRAST:No results found for this or any previous visit. WITHOUT CONTRAST: No results found for this or any previous visit. CXR      2-view: No results found for this or any previous visit. Portable: No results found for this or any previous visit. Echo No results found for this or any previous visit. Assessment/Plan:    Active Hospital Problems    Diagnosis Date Noted    Hip pain, acute, right [M25.551] 02/04/2020     Right septic hip: plan for explantation today. Zosyn. ID following MSSA following sensitivity    Additional work up or/and treatment plan may be added today or then after based on clinical progression. I am managing a portion of pt care. Some medical issues are handled byother specialists. Additional work up and treatment should be done in out pt setting by pt PCP and other out pt providers. In addition to examining and evaluating pt, I spent additional time explaining care, normaland abnormal findings, and treatment plan.  All of pt questions were answered. Counseling, diet and education were provided. Case will be discussed with nursing staff when appropriate. Family will be updated if and whenappropriate.       Electronically signed by Jaimie Fitzpatrick DO on 2/6/2020 at 11:22 AM

## 2020-02-07 LAB
ABO/RH: NORMAL
ANAEROBIC CULTURE: NORMAL
ANAEROBIC CULTURE: NORMAL
ANION GAP SERPL CALCULATED.3IONS-SCNC: 8 MEQ/L (ref 9–15)
ANISOCYTOSIS: ABNORMAL
ANTIBODY SCREEN: NORMAL
BASOPHILS ABSOLUTE: 0.2 K/UL (ref 0–0.2)
BASOPHILS RELATIVE PERCENT: 2 %
BILIRUBIN URINE: NEGATIVE
BLOOD, URINE: NEGATIVE
BUN BLDV-MCNC: 8 MG/DL (ref 6–20)
BURR CELLS: ABNORMAL
CALCIUM SERPL-MCNC: 8.3 MG/DL (ref 8.5–9.9)
CHLORIDE BLD-SCNC: 93 MEQ/L (ref 95–107)
CLARITY: CLEAR
CO2: 27 MEQ/L (ref 20–31)
COLOR: YELLOW
CREAT SERPL-MCNC: 0.57 MG/DL (ref 0.7–1.2)
CULTURE SURGICAL: NORMAL
CULTURE SURGICAL: NORMAL
EOSINOPHILS ABSOLUTE: 0 K/UL (ref 0–0.7)
EOSINOPHILS RELATIVE PERCENT: 0.5 %
GFR AFRICAN AMERICAN: >60
GFR NON-AFRICAN AMERICAN: >60
GLUCOSE BLD-MCNC: 108 MG/DL (ref 70–99)
GLUCOSE URINE: NEGATIVE MG/DL
GRAM STAIN RESULT: NORMAL
GRAM STAIN RESULT: NORMAL
HCT VFR BLD CALC: 22.2 % (ref 42–52)
HEMOGLOBIN: 7.2 G/DL (ref 14–18)
HYPOCHROMIA: ABNORMAL
KETONES, URINE: NEGATIVE MG/DL
LEUKOCYTE ESTERASE, URINE: NEGATIVE
LYMPHOCYTES ABSOLUTE: 1.1 K/UL (ref 1–4.8)
LYMPHOCYTES RELATIVE PERCENT: 9 %
MCH RBC QN AUTO: 27.5 PG (ref 27–31.3)
MCHC RBC AUTO-ENTMCNC: 32.4 % (ref 33–37)
MCV RBC AUTO: 84.8 FL (ref 80–100)
MICROCYTES: ABNORMAL
MONOCYTES ABSOLUTE: 1.1 K/UL (ref 0.2–0.8)
MONOCYTES RELATIVE PERCENT: 9.4 %
NEUTROPHILS ABSOLUTE: 9.7 K/UL (ref 1.4–6.5)
NEUTROPHILS RELATIVE PERCENT: 79 %
NITRITE, URINE: NEGATIVE
PDW BLD-RTO: 15 % (ref 11.5–14.5)
PH UA: 6.5 (ref 5–9)
PLATELET # BLD: 282 K/UL (ref 130–400)
PLATELET SLIDE REVIEW: NORMAL
POTASSIUM REFLEX MAGNESIUM: 3.7 MEQ/L (ref 3.4–4.9)
PROTEIN UA: NEGATIVE MG/DL
RBC # BLD: 2.61 M/UL (ref 4.7–6.1)
SODIUM BLD-SCNC: 128 MEQ/L (ref 135–144)
SPECIFIC GRAVITY UA: 1 (ref 1–1.03)
URINE REFLEX TO CULTURE: NORMAL
UROBILINOGEN, URINE: 0.2 E.U./DL
WBC # BLD: 12.3 K/UL (ref 4.8–10.8)

## 2020-02-07 PROCEDURE — P9016 RBC LEUKOCYTES REDUCED: HCPCS

## 2020-02-07 PROCEDURE — 36415 COLL VENOUS BLD VENIPUNCTURE: CPT

## 2020-02-07 PROCEDURE — 86850 RBC ANTIBODY SCREEN: CPT

## 2020-02-07 PROCEDURE — 6370000000 HC RX 637 (ALT 250 FOR IP): Performed by: ORTHOPAEDIC SURGERY

## 2020-02-07 PROCEDURE — 97166 OT EVAL MOD COMPLEX 45 MIN: CPT

## 2020-02-07 PROCEDURE — 6370000000 HC RX 637 (ALT 250 FOR IP): Performed by: NURSE PRACTITIONER

## 2020-02-07 PROCEDURE — 97535 SELF CARE MNGMENT TRAINING: CPT

## 2020-02-07 PROCEDURE — 6370000000 HC RX 637 (ALT 250 FOR IP): Performed by: INTERNAL MEDICINE

## 2020-02-07 PROCEDURE — 86901 BLOOD TYPING SEROLOGIC RH(D): CPT

## 2020-02-07 PROCEDURE — 85025 COMPLETE CBC W/AUTO DIFF WBC: CPT

## 2020-02-07 PROCEDURE — 86923 COMPATIBILITY TEST ELECTRIC: CPT

## 2020-02-07 PROCEDURE — 2580000003 HC RX 258: Performed by: ORTHOPAEDIC SURGERY

## 2020-02-07 PROCEDURE — 86900 BLOOD TYPING SEROLOGIC ABO: CPT

## 2020-02-07 PROCEDURE — 6360000002 HC RX W HCPCS: Performed by: ORTHOPAEDIC SURGERY

## 2020-02-07 PROCEDURE — 81003 URINALYSIS AUTO W/O SCOPE: CPT

## 2020-02-07 PROCEDURE — 36430 TRANSFUSION BLD/BLD COMPNT: CPT

## 2020-02-07 PROCEDURE — 97162 PT EVAL MOD COMPLEX 30 MIN: CPT

## 2020-02-07 PROCEDURE — 6360000002 HC RX W HCPCS: Performed by: INTERNAL MEDICINE

## 2020-02-07 PROCEDURE — 2700000000 HC OXYGEN THERAPY PER DAY

## 2020-02-07 PROCEDURE — 99232 SBSQ HOSP IP/OBS MODERATE 35: CPT | Performed by: INTERNAL MEDICINE

## 2020-02-07 PROCEDURE — 1210000000 HC MED SURG R&B

## 2020-02-07 PROCEDURE — 2580000003 HC RX 258: Performed by: NURSE PRACTITIONER

## 2020-02-07 PROCEDURE — 80048 BASIC METABOLIC PNL TOTAL CA: CPT

## 2020-02-07 RX ORDER — BACLOFEN 10 MG/1
10 TABLET ORAL 3 TIMES DAILY
Status: DISCONTINUED | OUTPATIENT
Start: 2020-02-07 | End: 2020-02-10 | Stop reason: HOSPADM

## 2020-02-07 RX ORDER — CEFAZOLIN SODIUM 2 G/50ML
2 SOLUTION INTRAVENOUS EVERY 8 HOURS
Status: DISCONTINUED | OUTPATIENT
Start: 2020-02-07 | End: 2020-02-07

## 2020-02-07 RX ORDER — CEFAZOLIN SODIUM 2 G/50ML
2 SOLUTION INTRAVENOUS EVERY 8 HOURS
Status: DISCONTINUED | OUTPATIENT
Start: 2020-02-07 | End: 2020-02-10 | Stop reason: HOSPADM

## 2020-02-07 RX ORDER — OXYCODONE HYDROCHLORIDE AND ACETAMINOPHEN 5; 325 MG/1; MG/1
1 TABLET ORAL ONCE
Status: COMPLETED | OUTPATIENT
Start: 2020-02-07 | End: 2020-02-07

## 2020-02-07 RX ORDER — L. ACIDOPHILUS/L.BULGARICUS 1MM CELL
1 TABLET ORAL 2 TIMES DAILY
Status: DISCONTINUED | OUTPATIENT
Start: 2020-02-07 | End: 2020-02-10 | Stop reason: HOSPADM

## 2020-02-07 RX ORDER — 0.9 % SODIUM CHLORIDE 0.9 %
20 INTRAVENOUS SOLUTION INTRAVENOUS ONCE
Status: COMPLETED | OUTPATIENT
Start: 2020-02-07 | End: 2020-02-07

## 2020-02-07 RX ADMIN — OXYCODONE HYDROCHLORIDE AND ACETAMINOPHEN 1 TABLET: 5; 325 TABLET ORAL at 06:04

## 2020-02-07 RX ADMIN — DOCUSATE SODIUM 100 MG: 100 CAPSULE, LIQUID FILLED ORAL at 08:50

## 2020-02-07 RX ADMIN — SODIUM CHLORIDE 20 ML: 9 INJECTION, SOLUTION INTRAVENOUS at 16:00

## 2020-02-07 RX ADMIN — BACLOFEN 10 MG: 10 TABLET ORAL at 08:50

## 2020-02-07 RX ADMIN — OXYCODONE HYDROCHLORIDE 5 MG: 5 TABLET ORAL at 17:36

## 2020-02-07 RX ADMIN — OXYCODONE HYDROCHLORIDE 5 MG: 5 TABLET ORAL at 08:50

## 2020-02-07 RX ADMIN — SENNOSIDES AND DOCUSATE SODIUM 1 TABLET: 8.6; 5 TABLET ORAL at 08:50

## 2020-02-07 RX ADMIN — DIAZEPAM 5 MG: 5 TABLET ORAL at 18:38

## 2020-02-07 RX ADMIN — ACETAMINOPHEN 650 MG: 325 TABLET ORAL at 08:53

## 2020-02-07 RX ADMIN — DIAZEPAM 5 MG: 5 TABLET ORAL at 03:48

## 2020-02-07 RX ADMIN — CEFAZOLIN SODIUM 2 G: 2 SOLUTION INTRAVENOUS at 03:52

## 2020-02-07 RX ADMIN — BACLOFEN 10 MG: 10 TABLET ORAL at 21:12

## 2020-02-07 RX ADMIN — CEFAZOLIN SODIUM 2 G: 2 SOLUTION INTRAVENOUS at 17:38

## 2020-02-07 RX ADMIN — ACETAMINOPHEN 650 MG: 325 TABLET ORAL at 13:06

## 2020-02-07 RX ADMIN — MORPHINE SULFATE 4 MG: 4 INJECTION, SOLUTION INTRAMUSCULAR; INTRAVENOUS at 03:49

## 2020-02-07 RX ADMIN — ACETAMINOPHEN 650 MG: 325 TABLET ORAL at 01:44

## 2020-02-07 RX ADMIN — LACTOBACILLUS TAB 1 TABLET: TAB at 21:12

## 2020-02-07 RX ADMIN — ACETAMINOPHEN 650 MG: 325 TABLET ORAL at 18:37

## 2020-02-07 RX ADMIN — ASPIRIN 81 MG: 81 TABLET, COATED ORAL at 21:12

## 2020-02-07 RX ADMIN — ASPIRIN 81 MG: 81 TABLET, COATED ORAL at 08:50

## 2020-02-07 RX ADMIN — OXYCODONE HYDROCHLORIDE 5 MG: 5 TABLET ORAL at 01:44

## 2020-02-07 RX ADMIN — SENNOSIDES AND DOCUSATE SODIUM 1 TABLET: 8.6; 5 TABLET ORAL at 21:12

## 2020-02-07 RX ADMIN — BACLOFEN 10 MG: 10 TABLET ORAL at 13:06

## 2020-02-07 RX ADMIN — OXYCODONE HYDROCHLORIDE 5 MG: 5 TABLET ORAL at 21:39

## 2020-02-07 RX ADMIN — DOCUSATE SODIUM 100 MG: 100 CAPSULE, LIQUID FILLED ORAL at 21:12

## 2020-02-07 RX ADMIN — OXYCODONE HYDROCHLORIDE 5 MG: 5 TABLET ORAL at 13:06

## 2020-02-07 RX ADMIN — Medication 10 ML: at 21:13

## 2020-02-07 RX ADMIN — SODIUM CHLORIDE: 9 INJECTION, SOLUTION INTRAVENOUS at 18:42

## 2020-02-07 ASSESSMENT — PAIN SCALES - GENERAL
PAINLEVEL_OUTOF10: 8
PAINLEVEL_OUTOF10: 6
PAINLEVEL_OUTOF10: 7
PAINLEVEL_OUTOF10: 5
PAINLEVEL_OUTOF10: 0
PAINLEVEL_OUTOF10: 7
PAINLEVEL_OUTOF10: 8
PAINLEVEL_OUTOF10: 5
PAINLEVEL_OUTOF10: 8
PAINLEVEL_OUTOF10: 5
PAINLEVEL_OUTOF10: 7
PAINLEVEL_OUTOF10: 6
PAINLEVEL_OUTOF10: 7

## 2020-02-07 ASSESSMENT — PAIN DESCRIPTION - PROGRESSION: CLINICAL_PROGRESSION: NOT CHANGED

## 2020-02-07 ASSESSMENT — PAIN - FUNCTIONAL ASSESSMENT: PAIN_FUNCTIONAL_ASSESSMENT: PREVENTS OR INTERFERES WITH ALL ACTIVE AND SOME PASSIVE ACTIVITIES

## 2020-02-07 ASSESSMENT — PAIN DESCRIPTION - FREQUENCY: FREQUENCY: INTERMITTENT

## 2020-02-07 ASSESSMENT — PAIN DESCRIPTION - ORIENTATION
ORIENTATION: RIGHT
ORIENTATION: RIGHT

## 2020-02-07 ASSESSMENT — PAIN DESCRIPTION - LOCATION
LOCATION: HIP
LOCATION: HIP

## 2020-02-07 ASSESSMENT — PAIN DESCRIPTION - DESCRIPTORS
DESCRIPTORS: ACHING
DESCRIPTORS: SORE;ACHING

## 2020-02-07 ASSESSMENT — PAIN DESCRIPTION - PAIN TYPE
TYPE: SURGICAL PAIN
TYPE: SURGICAL PAIN

## 2020-02-07 NOTE — OP NOTE
component fit  nicely with a size 54 cup. We opened the cup first, mixed a batch of  cement with vanco and tobra powder carried to the cement, and while the  cement was sticky, I inserted it on the surface of the acetabular  component and then completely seated acetabular component and held it in  position and just the cement dried. All marginal cement was removed. Once this was complete, we inserted trial femur, and just to assess the  stability and felt we would have a stable construct, we removed the  trial femur, irrigated the femoral canal with extensive irrigation and  then opened the long, small femoral component mixed in another batch of  cement with vancomycin and tobramycin powder in the cement solution and  cemented the femoral component in approximately 15 degrees of  anteversion. Once the cemented femoral component had hardened, the  trial head was applied and we assessed the position of the head based on  how many threads were exposed and opened the final head, mixed the  polymer and inserted the head to the appropriate depth, reduced the hip. The hip Stability was assessed and found to be satisfactory. We then  irrigated with copious amount of saline irrigation and closed using O  Vicryl for the fascia, 3-0 Monocryl for subcutaneous tissue and staples  for the skin. Wound VAC was applied. The patient tolerated the  procedure well and was taken to the postanesthesia care unit in stable  condition without complication.         Marlee Galeana MD    D: 02/07/2020 6:37:56       T: 02/07/2020 10:42:30     [email or web address]  Job#: [id number]     Doc#: [id number]    CC:

## 2020-02-07 NOTE — PLAN OF CARE
Therapy evaluation completed. Please see daily notes and/or progress notes for details related to planned treatment interventions, goals and functional abilities. ]

## 2020-02-07 NOTE — PROGRESS NOTES
Pt. AxOx4. Vitals obtained Q4h. Pt. Afebrile. Pt c/o pain at R hip and continuously rating a 8/10. Pt. States he gets little relief from the pain medications. Currently alternating between morphine 4mg and Roxicodone and valium. Ice therapy also in use. Messaged Hospitalist at 0703 concerning pt's pain. One-time order given. See MAR. Pt. Refused wanting to ambulate this evening. Pt. Aware that PT will evaluate in the a.m. order is currently partial weight-bearing on R hip. PT. Denies SOB. Denies nausea. Weaned off nasal cannula. Currently sating at 96% on RA. Abductor pillow in place. Pt. Attempting to reposition self in bed by intermittently elevating legs in the bed. Neuro assessment completed Q4. Pulses palpable. Skin is warm and dry. Edema noted at R hip surrounding the wound vac. Dressing is CDI. Pt. C/o burning with urination via urinal.   Urine is yellow and clear with no odor. No discharge or redness noted on assessment. Hospitalist notified. No new orders at this time. Will continue to monitor.

## 2020-02-07 NOTE — PROGRESS NOTES
[facility] OCCUPATIONAL THERAPY EVALUATION - ACUTE     NAME: Reymundo Mohan  : 1980 (37 y.o.)  MRN: [id number]  CODE STATUS: Full Code  Room: Z745/D677-13    Date of Service: 2020    Patient Diagnosis(es): Hip pain, acute, right [M25.551]   No chief complaint on file. Patient Active Problem List    Diagnosis Date Noted    Hip pain, acute, right 2020    Status post total replacement of right hip 2018    Osteoarthritis of right hip 2017    Hodgkin lymphoma ([facility] [address] 2015    H/O splenectomy 2015    Regular astigmatism 2014    Tear film insufficiency 2014        Past Medical History:   Diagnosis Date    Cancer [facility]) 1993    Hodgkins Lymphoma     Past Surgical History:   Procedure Laterality Date    HIP SURGERY Right 2020    RIGHT HIP  INCISION AND DRAINAGE performed by Jace Mills MD at [address] [facility] HIP ARTHROPLASTY Right 2018    RIGHT HIP TOTAL HIP ARTHROPLASTY LITTLE performed by Jacquelin Murry MD at [address] [place] [address]          Restrictions  Restrictions/Precautions: Weight Bearing, Fall Risk  Lower Extremity Weight Bearing Restrictions  Right Lower Extremity Weight Bearing: Partial Weight Bearing  Partial Weight Bearing Percentage Or Pounds: 50% weightbearing  Position Activity Restriction  Hip Precautions: Posterior hip precautions     Safety Devices: Safety Devices  Safety Devices in place: Yes  Type of devices: All fall risk precautions in place    Subjective  Pre Treatment Pain Screening  Pain at present: 0  Scale Used: Numeric Score  Intervention List: Patient able to continue with treatment, Patient declined any intervention  Comments / Details: Pt. states pain after movement but denies need for medication. Fresh ice pack provided.      Pain Reassessment:   Pain Assessment  Patient Currently in Pain: Yes  Pain Assessment: 0-10  Pain Level: 5  Pain Type: Surgical pain  Pain Location: Hip  Pain Orientation: Right  Pain Descriptors: Aching       Prior Level of Function:  Social/Functional History  Lives With: Significant other(7, 13, 21 children)  Type of Home: House  Home Layout: Two level, 1/2 bath on main level(13-15 with handrail)  Home Access: Stairs to enter with rails  Entrance Stairs - Number of Steps: 3  Entrance Stairs - Rails: Both  Bathroom Shower/Tub: Walk-in shower  Home Equipment: Rolling walker, Cane  ADL Assistance: Independent  Homemaking Assistance: Independent  Homemaking Responsibilities: Yes(shared)  Ambulation Assistance: Independent(no AD)  Transfer Assistance: Independent  Active : Yes  Occupation: Full time employment  Type of occupation: \"On my feet all day\"    OBJECTIVE:     Orientation Status:  Orientation  Overall Orientation Status: Within Normal Limits    Observation:  Observation/Palpation  Posture: Good  Observation: Pt. alert and attentive; wound vac in place    Cognition Status:  Cognition  Overall Cognitive Status: WNL    Perception Status:  Perception  Overall Perceptual Status: WFL    Sensation Status:  Sensation  Overall Sensation Status: WFL    Vision and Hearing Status:  Vision  Vision: Within Functional Limits  Hearing  Hearing: Within functional limits     ROM:   LUE AROM (degrees)  LUE AROM : WFL  Left Hand AROM (degrees)  Left Hand AROM: WFL  RUE AROM (degrees)  RUE AROM : WFL  Right Hand AROM (degrees)  Right Hand AROM: WFL    Strength:  LUE Strength  Gross LUE Strength: WFL  L Hand General: 4/5  LUE Strength Comment: 4/5 all planes  RUE Strength  Gross RUE Strength: WFL  R Hand General: 4/5  RUE Strength Comment: 4/5 all planes    Coordination, Tone, Quality of Movement:    Tone RUE  RUE Tone: Normotonic  Tone LUE  LUE Tone: Normotonic  Coordination  Movements Are Fluid And Coordinated: Yes    Hand Dominance:  Hand Dominance  Hand Dominance: Right    ADL Status:  ADL  Feeding: Independent  Grooming: Setup  UE Bathing: Setup  LE

## 2020-02-07 NOTE — PROGRESS NOTES
Patient got up with PT/OT for the first time post op and felt dizzy weak upon standing. Pt resting again in bed. BP dropped to 91/75 HR 84. Hgb also noted to be lower today, 7.2 compared to 10.9 yesterday. Vero notified.  Electronically signed by Aguust Zhou RN on 2/7/2020 at 10:57 AM

## 2020-02-07 NOTE — PROGRESS NOTES
First unit of RBC infusing. Vital signs stable. Pt tolerating well. Pain managed with prn medications. Wound vac to right hip intact. Scant drainage in canister. Pt resting in bed. Safety maintained. Call light in reach.  Electronically signed by Chey Martino RN on 2/7/2020 at 6:05 PM

## 2020-02-07 NOTE — PROGRESS NOTES
Infectious Diseases Inpatient Progress Note          HISTORY OF PRESENT ILLNESS:  Follow up R hip abscess, PJI on IV Ancef, well tolerated. Patient has no pain, no fever. S/P R hip PJ explant. R hip pain is well controlled, mild dysuria. Current Medications:     baclofen  10 mg Oral TID    sodium chloride  20 mL Intravenous Once    ceFAZolin (ANCEF) IVPB  2 g Intravenous Q8H    sodium chloride flush  10 mL Intravenous 2 times per day    acetaminophen  650 mg Oral Q6H    docusate sodium  100 mg Oral BID    sennosides-docusate sodium  1 tablet Oral BID    aspirin  81 mg Oral BID    alteplase  1 mg Intracatheter Once    alteplase  1 mg Intracatheter Once    sodium chloride flush  10 mL Intravenous 2 times per day       Allergies:  Tramadol      Review of Systems  14 system review is negative other than HPI    Physical Exam  Vitals:    02/07/20 0411 02/07/20 0728 02/07/20 1026 02/07/20 1052   BP: 107/60 (!) 104/54 91/75 (!) 95/46   Pulse: 88 79 87 84   Resp:       Temp: 98.2 °F (36.8 °C) 98.6 °F (37 °C)  98.4 °F (36.9 °C)   TempSrc: Oral Oral     SpO2: 97% 97%     Weight:       Height:         General Appearance: alert and oriented to person, place and time, well-developed and well-nourished, in no acute distress  Skin: warm and dry, no rash. Head: normocephalic and atraumatic  Eyes: anicteric sclerae  ENT: oropharynx clear and moist with normal mucous membranes.  No oral thrush  Lungs: normal respiratory effort  Abdomen: soft, no tenderness  No leg edema  No erythema, no tenderness  Intact R hip VAC    DATA:    Lab Results   Component Value Date    WBC 12.3 (H) 02/07/2020    HGB 7.2 (L) 02/07/2020    HCT 22.2 (L) 02/07/2020    MCV 84.8 02/07/2020     02/07/2020     Lab Results   Component Value Date    CREATININE 0.57 (L) 02/07/2020    BUN 8 02/07/2020     (L) 02/07/2020    K 3.7 02/07/2020    CL 93 (L) 02/07/2020    CO2 27 02/07/2020       Hepatic Function Panel:  Lab Results   Component

## 2020-02-07 NOTE — PROGRESS NOTES
Physical Therapy Missed Treatment   Facility/Department: [facility] MED SURG L863/M028-27    NAME: Martine Snyder    : 1980 (37 y.o.)  MRN: [id number]    Account: [de-identified]  Gender: male    [x] Patient Unavailable: Spoke with nursing. Patient hypotensive. Also to receive 2 units of blood, hold therapy this PM.     Will attempt PT treatment again at earliest convenience.         Electronically signed by Tio Ambrosio PTA on 20 at 1:09 PM

## 2020-02-07 NOTE — PROGRESS NOTES
Hip  Pain Orientation: Right  Pain Descriptors: Sore;Aching  Pain Frequency: Intermittent  Clinical Progression: Not changed  Functional Pain Assessment: Prevents or interferes with all active and some passive activities  Non-Pharmaceutical Pain Intervention(s): Rest;Repositioned  Response to Pain Intervention: Patient Satisfied    Prior Level of Function:  Social/Functional History  Lives With: Significant other(7, 13, 21 children)  Type of Home: House  Home Layout: Two level, 1/2 bath on main level, Bed/Bath upstairs(13-15 with handrail)  Home Access: Stairs to enter with rails  Entrance Stairs - Number of Steps: 3  Entrance Stairs - Rails: Both  Bathroom Shower/Tub: Walk-in shower  Home Equipment: Rolling walker, Cane  ADL Assistance: Independent  Homemaking Assistance: Independent  Homemaking Responsibilities: Yes(shared)  Ambulation Assistance: Independent(no AD)  Transfer Assistance: Independent  Active : Yes  Occupation: Full time employment  Type of occupation: \"On my feet all day\"    OBJECTIVE:   Vision: Within Functional Limits  Hearing: Within functional limits    Cognition:  Follows Commands: Within Functional Limits    Observation/Palpation  Posture: Good  Observation: wound vax to R hip incision, no acute distress, pt hypotensive in chair 77/53mmHg (pt returned to bed, RN notified)    ROM:  RLE General PROM: posterior hip precautions, pt able to sit 90/90 and lay supine though with increased pain   LLE PROM: WFL    Strength:  Strength RLE  Comment: NT d/t 50% WB and antibiotic spacer, functionally hip 3-/5 observed with bed mobility, knee 3+/5  Strength LLE  Strength LLE: WFL     Motor Control  Gross Motor?: WFL  Sensation  Overall Sensation Status: WFL    Bed mobility  Supine to Sit: Stand by assistance  Sit to Supine: Minimal assistance  Comment: increased time and effort noted, HOB mostly flat.  Pt ed in posterior hip precautions and WB prior to mobility    Transfers  Sit to Stand: Stand by

## 2020-02-07 NOTE — PROGRESS NOTES
Hospitalist Daily Progress Note  Name: Angel Lindsay  Age: 37 y.o. Gender: male  CodeStatus: Full Code  Allergies: Tramadol    Chief Complaint:No chief complaint on file. Primary Care Provider: Nhi Ramirez MD  InpatientTreatment Team: Treatment Team: Attending Provider: Jessie Osorio MD; Consulting Physician: Prerna Mtz DO; Consulting Physician: Lisa Ann MD; Surgeon: Marlon Sanches MD; Surgeon: Jessie Osorio MD; Patient Care Tech: Leitha Plain; : Vega Neal [facility]; : LLUVIA Martinez; Registered Nurse: Hermelinda Hernández RN; Patient Care Tech: Mirian Hawkins; : Tram Dumont RN  Admission Date: 2/3/2020      Subjective: Pt is seen and evaluated at bedside. MSSA positive. Pan-sensitive. Post operative without complications. He did have dysuria, however UA is benign. Physical Exam  Constitutional:       Appearance: Normal appearance. HENT:      Head: Normocephalic and atraumatic. Mouth/Throat:      Mouth: Mucous membranes are moist.      Pharynx: Oropharynx is clear. Eyes:      Conjunctiva/sclera: Conjunctivae normal.      Pupils: Pupils are equal, round, and reactive to light. Cardiovascular:      Rate and Rhythm: Normal rate and regular rhythm. Pulmonary:      Effort: Pulmonary effort is normal.      Breath sounds: No wheezing, rhonchi or rales. Abdominal:      General: There is no distension. Tenderness: There is no abdominal tenderness. There is no guarding. Musculoskeletal:      Comments: Right hip  Wound vac in place. Neurological:      Mental Status: He is alert. Review of Systems   Reason unable to perform ROS: 14 point ros reviewed and negative except for as above.         Medications:  Reviewed    Infusion Medications:    sodium chloride Stopped (02/05/20 0916)     Scheduled Medications:    baclofen  10 mg Oral TID    ceFAZolin (ANCEF) IVPB  2 g Intravenous Q8H    sodium chloride  20 mL Intravenous Once    sodium chloride flush  10 mL Intravenous 2 times per day    acetaminophen  650 mg Oral Q6H    docusate sodium  100 mg Oral BID    sennosides-docusate sodium  1 tablet Oral BID    aspirin  81 mg Oral BID    alteplase  1 mg Intracatheter Once    alteplase  1 mg Intracatheter Once    sodium chloride flush  10 mL Intravenous 2 times per day     PRN Meds: magnesium hydroxide, ondansetron, sodium chloride flush, oxyCODONE, diazepam, acetaminophen    Labs:   Recent Labs     02/05/20  0509 02/06/20 0600 02/07/20 0600   WBC 13.9* 11.1* 12.3*   HGB 11.8* 10.9* 7.2*   HCT 36.5* 34.1* 22.2*   * 396 282     Recent Labs     02/05/20 0509 02/06/20 0600 02/07/20 0600    138 128*   K 4.3 4.2 3.7    100 93*   CO2 25 26 27   BUN 6 12 8   CREATININE 0.64* 0.67* 0.57*   CALCIUM 9.0 8.7 8.3*     No results for input(s): AST, ALT, BILIDIR, BILITOT, ALKPHOS in the last 72 hours. No results for input(s): INR in the last 72 hours. No results for input(s): Silke Medici in the last 72 hours. Urinalysis:   Lab Results   Component Value Date    NITRU Negative 02/07/2020    BLOODU Negative 02/07/2020    SPECGRAV 1.004 02/07/2020    GLUCOSEU Negative 02/07/2020       Radiology:   Most recent    Chest CT      WITH CONTRAST:No results found for this or any previous visit. WITHOUT CONTRAST: No results found for this or any previous visit. CXR      2-view: No results found for this or any previous visit. Portable: No results found for this or any previous visit. Echo No results found for this or any previous visit. Assessment/Plan:    Active Hospital Problems    Diagnosis Date Noted    Hip pain, acute, right [M25.551] 02/04/2020     Right septic hip: POD1 s/p explantation. MSSA, pan-sensitive. Continue ancef. Follow ID recommendations. Additional work up or/and treatment plan may be added today or then after based on clinical progression.  I am managing a

## 2020-02-07 NOTE — PROGRESS NOTES
Hip surgery    Progress Note    Subjective:     Post-Operative Day: 1 Status Post right Hip Arthroplasty-removal and antibiotic spacer placement  Systemic or Specific Complaints:No Complaints    Objective:     CURRENT VITALS:  /60   Pulse 88   Temp 98.2 °F (36.8 °C) (Oral)   Resp 16   Ht 6' 2\" (1.88 m)   Wt 228 lb 1 oz (103.4 kg)   SpO2 97%   BMI 29.28 kg/m²     General: alert, appears stated age and cooperative   Wound: Wound clean and dry no evidence of infection. previna in place   Motion: Pt is to be out of bed today   DVT Exam: No evidence of DVT seen on physical exam.       NVI in lower extremity. Thigh swollen but soft. Moving foot and ankle. Data Review  Recent Labs     02/05/20  0509 02/06/20  0600   WBC 13.9* 11.1*   RBC 4.30* 4.02*   HGB 11.8* 10.9*   HCT 36.5* 34.1*   MCV 84.9 84.7   MCH 27.3 27.1   MCHC 32.2* 32.0*   RDW 14.9* 15.3*   * 396       Assessment:     Status Post right Hip Arthroplasty. Doing well postoperatively.  Pt does have some spasms- we will add baclofen and he is complaining of burning with urination- we will send a ua-    antibiotics per ID    Plan:      1: Continues current post-op course :  2:  Continue Deep venous thrombosis prophylaxis  3:  Continue physical therapy  4:  Continue Pain Control

## 2020-02-08 LAB
ANION GAP SERPL CALCULATED.3IONS-SCNC: 12 MEQ/L (ref 9–15)
BASOPHILS ABSOLUTE: 0.1 K/UL (ref 0–0.2)
BASOPHILS RELATIVE PERCENT: 0.7 %
BLOOD CULTURE, ROUTINE: NORMAL
BUN BLDV-MCNC: 6 MG/DL (ref 6–20)
CALCIUM SERPL-MCNC: 8.7 MG/DL (ref 8.5–9.9)
CHLORIDE BLD-SCNC: 99 MEQ/L (ref 95–107)
CO2: 24 MEQ/L (ref 20–31)
CREAT SERPL-MCNC: 0.54 MG/DL (ref 0.7–1.2)
EOSINOPHILS ABSOLUTE: 0.3 K/UL (ref 0–0.7)
EOSINOPHILS RELATIVE PERCENT: 2.5 %
GFR AFRICAN AMERICAN: >60
GFR NON-AFRICAN AMERICAN: >60
GLUCOSE BLD-MCNC: 91 MG/DL (ref 70–99)
HCT VFR BLD CALC: 25.7 % (ref 42–52)
HEMOGLOBIN: 8.4 G/DL (ref 14–18)
LYMPHOCYTES ABSOLUTE: 1.9 K/UL (ref 1–4.8)
LYMPHOCYTES RELATIVE PERCENT: 15.2 %
MCH RBC QN AUTO: 27.7 PG (ref 27–31.3)
MCHC RBC AUTO-ENTMCNC: 32.8 % (ref 33–37)
MCV RBC AUTO: 84.4 FL (ref 80–100)
MONOCYTES ABSOLUTE: 1.9 K/UL (ref 0.2–0.8)
MONOCYTES RELATIVE PERCENT: 15.2 %
NEUTROPHILS ABSOLUTE: 8.3 K/UL (ref 1.4–6.5)
NEUTROPHILS RELATIVE PERCENT: 66.4 %
PDW BLD-RTO: 15 % (ref 11.5–14.5)
PLATELET # BLD: 298 K/UL (ref 130–400)
POTASSIUM SERPL-SCNC: 3.8 MEQ/L (ref 3.4–4.9)
RBC # BLD: 3.04 M/UL (ref 4.7–6.1)
REJECTED TEST: NORMAL
SODIUM BLD-SCNC: 135 MEQ/L (ref 135–144)
WBC # BLD: 12.5 K/UL (ref 4.8–10.8)

## 2020-02-08 PROCEDURE — 6370000000 HC RX 637 (ALT 250 FOR IP): Performed by: ORTHOPAEDIC SURGERY

## 2020-02-08 PROCEDURE — 2580000003 HC RX 258: Performed by: ORTHOPAEDIC SURGERY

## 2020-02-08 PROCEDURE — 6370000000 HC RX 637 (ALT 250 FOR IP): Performed by: INTERNAL MEDICINE

## 2020-02-08 PROCEDURE — 36415 COLL VENOUS BLD VENIPUNCTURE: CPT

## 2020-02-08 PROCEDURE — 80048 BASIC METABOLIC PNL TOTAL CA: CPT

## 2020-02-08 PROCEDURE — 36592 COLLECT BLOOD FROM PICC: CPT

## 2020-02-08 PROCEDURE — 6370000000 HC RX 637 (ALT 250 FOR IP): Performed by: NURSE PRACTITIONER

## 2020-02-08 PROCEDURE — 6360000002 HC RX W HCPCS: Performed by: INTERNAL MEDICINE

## 2020-02-08 PROCEDURE — 2580000003 HC RX 258

## 2020-02-08 PROCEDURE — 1210000000 HC MED SURG R&B

## 2020-02-08 PROCEDURE — 97535 SELF CARE MNGMENT TRAINING: CPT

## 2020-02-08 PROCEDURE — 97110 THERAPEUTIC EXERCISES: CPT

## 2020-02-08 PROCEDURE — 97116 GAIT TRAINING THERAPY: CPT

## 2020-02-08 PROCEDURE — 85025 COMPLETE CBC W/AUTO DIFF WBC: CPT

## 2020-02-08 RX ORDER — SODIUM CHLORIDE 9 MG/ML
INJECTION, SOLUTION INTRAVENOUS
Status: COMPLETED
Start: 2020-02-08 | End: 2020-02-08

## 2020-02-08 RX ADMIN — LACTOBACILLUS TAB 1 TABLET: TAB at 20:18

## 2020-02-08 RX ADMIN — BACLOFEN 10 MG: 10 TABLET ORAL at 14:06

## 2020-02-08 RX ADMIN — ASPIRIN 81 MG: 81 TABLET, COATED ORAL at 08:48

## 2020-02-08 RX ADMIN — LACTOBACILLUS TAB 1 TABLET: TAB at 08:50

## 2020-02-08 RX ADMIN — OXYCODONE HYDROCHLORIDE 5 MG: 5 TABLET ORAL at 03:01

## 2020-02-08 RX ADMIN — ACETAMINOPHEN 650 MG: 325 TABLET ORAL at 01:53

## 2020-02-08 RX ADMIN — BACLOFEN 10 MG: 10 TABLET ORAL at 08:49

## 2020-02-08 RX ADMIN — SENNOSIDES AND DOCUSATE SODIUM 1 TABLET: 8.6; 5 TABLET ORAL at 08:48

## 2020-02-08 RX ADMIN — SODIUM CHLORIDE: 9 INJECTION, SOLUTION INTRAVENOUS at 00:30

## 2020-02-08 RX ADMIN — CEFAZOLIN SODIUM 2 G: 2 SOLUTION INTRAVENOUS at 14:07

## 2020-02-08 RX ADMIN — Medication 10 ML: at 01:53

## 2020-02-08 RX ADMIN — DIAZEPAM 5 MG: 5 TABLET ORAL at 17:19

## 2020-02-08 RX ADMIN — DOCUSATE SODIUM 100 MG: 100 CAPSULE, LIQUID FILLED ORAL at 20:18

## 2020-02-08 RX ADMIN — OXYCODONE HYDROCHLORIDE 5 MG: 5 TABLET ORAL at 20:17

## 2020-02-08 RX ADMIN — Medication 10 ML: at 20:20

## 2020-02-08 RX ADMIN — ACETAMINOPHEN 650 MG: 325 TABLET ORAL at 20:18

## 2020-02-08 RX ADMIN — ASPIRIN 81 MG: 81 TABLET, COATED ORAL at 20:18

## 2020-02-08 RX ADMIN — CEFAZOLIN SODIUM 2 G: 2 SOLUTION INTRAVENOUS at 01:55

## 2020-02-08 RX ADMIN — ACETAMINOPHEN 650 MG: 325 TABLET ORAL at 08:49

## 2020-02-08 RX ADMIN — OXYCODONE HYDROCHLORIDE 5 MG: 5 TABLET ORAL at 14:06

## 2020-02-08 RX ADMIN — Medication 10 ML: at 08:50

## 2020-02-08 RX ADMIN — OXYCODONE HYDROCHLORIDE 5 MG: 5 TABLET ORAL at 08:48

## 2020-02-08 RX ADMIN — CEFAZOLIN SODIUM 2 G: 2 SOLUTION INTRAVENOUS at 22:17

## 2020-02-08 RX ADMIN — DIAZEPAM 5 MG: 5 TABLET ORAL at 03:02

## 2020-02-08 RX ADMIN — SODIUM CHLORIDE: 9 INJECTION, SOLUTION INTRAVENOUS at 22:17

## 2020-02-08 RX ADMIN — DOCUSATE SODIUM 100 MG: 100 CAPSULE, LIQUID FILLED ORAL at 08:48

## 2020-02-08 RX ADMIN — BACLOFEN 10 MG: 10 TABLET ORAL at 20:18

## 2020-02-08 RX ADMIN — SENNOSIDES AND DOCUSATE SODIUM 1 TABLET: 8.6; 5 TABLET ORAL at 20:18

## 2020-02-08 ASSESSMENT — PAIN SCALES - GENERAL
PAINLEVEL_OUTOF10: 5
PAINLEVEL_OUTOF10: 7

## 2020-02-08 ASSESSMENT — PAIN DESCRIPTION - ORIENTATION: ORIENTATION: RIGHT

## 2020-02-08 ASSESSMENT — PAIN DESCRIPTION - LOCATION: LOCATION: HIP

## 2020-02-08 NOTE — PROGRESS NOTES
Better today, sore but ok    avss    provena in place, c/d/i  NVI    OOB with PT  Check labs  ID for antibx  ?  rehab

## 2020-02-08 NOTE — PROGRESS NOTES
Bed mobility  Supine to Sit: Stand by assistance  Sit to Supine: (DNT pt into chair.)  Comment: vc's for maintaining hip precautions pt tolerates well, increased time and effort needed. Transfers  Sit to Stand: Stand by assistance  Stand to sit: Stand by assistance  Bed to Chair: Stand by assistance  Car Transfer: Stand by assistance  Comment: pt does good job maintaining hip precautions and PWB during transfers, vc's for improved hand placement during car transfers. Ambulation  Ambulation?: Yes  WB Status: R LE 50 % WB, posterior hip precautions  Ambulation 1  Surface: level tile  Device: Rolling Walker  Assistance: Stand by assistance  Quality of Gait: step to pattern, absent heel strike  Gait Deviations: Slow Brissa;Decreased step length  Distance: 28' x2   Comments: pt with improved ability and tolerance. maintains PWB and hip precautions throughout. Stairs/Curb  Stairs?: Yes  Stairs  # Steps : 4  Rails: Bilateral  Assistance: Stand by assistance  Comment: vc's for sequencing and maintaing PWB, pt tolerates well and is safe with B rails. Exercises  Hip Abduction: sitting x 10  Knee Long Arc Quad: x 10  Ankle Pumps: x 10   Comments: pt given written HEP for supine/seated therex, instruction on technique dosage and frequency of all exercises, pt maintains hip precautions without issue. Activity Tolerance  Activity Tolerance: Patient Tolerated treatment well          ASSESSMENT   Assessment: pt with improved mobility, safe on stairs and car, maintains PWB and hip precautions throughout tx.       Discharge Recommendations:  Continue to assess pending progress    Goals  Long term goals  Long term goal 1: indep with bed mobility   Long term goal 2: functional transfers with supervision   Long term goal 3: amb 50ft with LRAD and supervision   Long term goal 4: 4 steps with B handrails to enter/exit home and SBA  Long term goal 5: pt will maintain posterior hip precautions with all mobility tasks with <10% VC  Patient Goals   Patient goals : \"go home\"    PLAN    Safety Devices  Type of devices: All fall risk precautions in place;Call light within reach; Chair alarm in place; Left in chair     AMPAC (6 CLICK) BASIC MOBILITY  AM-PAC Inpatient Mobility Raw Score : 18      Therapy Time   Individual   Time In 1016   Time Out 1049   Minutes 33      Gait: 18  BM/Trsf: 10   Therex: 5        Zita Jung PTA, 02/08/20 at 10:59 AM

## 2020-02-08 NOTE — PROGRESS NOTES
Patient received 2 units of RBC, vitals signs remained stable throughout infusion, patient tolerated well. Prevena to the Right hip intact, scant drainage. Lung sounds are clear, heart sound normal. Neurovascular checks are normal, he reports a 7/10 pain in the R leg and knee area. Patient has a good urine output 2425 mL this shift, urine is clear, no odor, he said the burning sensation is still present but not as bad and sharp as it was 2/7/20 in the AM. Patient is resting in bed, safety maintained, call light within reach.

## 2020-02-08 NOTE — CARE COORDINATION
MET WITH PT TO DISCUSS DC PLAN, PT STATES HE DOES NOT WANT TO GO TO A SNF FOR IV ATB, HE IS AWARE OF THE COST OF ATB AND STATES HE WILL USE CHARGE CARD TO PAY,  LOOKING FOR C IN [facility], CALL MADE TO [facility] WITH NO CALL BACK, [facility] AND [facility] DONT GOTO JULES, AWAITING CALL BACK FROM [facility] MAKE REFERRAL, PER DR YEPEZ- DR ACEVEDO MAY CONSIDER CHANGING TO DAILY [facility] IF PT WANTS TO COME TO OUTPT INFUSION, PT STATES HE WILL CONSIDER OUTPT INFUSION, BUT  MAY NOT BE ABLE TO DUE TO DISTANCE FROM HOME, PT TO DISCUSS WITH FAMILY, AWAITING  ORDER FOR ATB, AND PT DECISION, [facility]

## 2020-02-08 NOTE — PROGRESS NOTES
Exercises  Quad Sets: x 10  Heelslides: x 10  Gluteal Sets: x 10   Hip Abduction: x 10 AAROM  Ankle Pumps: x 10   Comments: reviewed written HEP for supine/seated therex. pt verbalizes understanding. vc's for maintaining hip precautions during. Activity Tolerance  Activity Tolerance: Patient Tolerated treatment well          ASSESSMENT   Assessment: reviewed written HEP for supine/seated therex, pt verbalizes understanding. Pt able to recall 2/3 hip precautions but recalls PWB status. Discharge Recommendations:  Continue to assess pending progress    Goals  Long term goals  Long term goal 1: indep with bed mobility   Long term goal 2: functional transfers with supervision   Long term goal 3: amb 50ft with LRAD and supervision   Long term goal 4: 4 steps with B handrails to enter/exit home and SBA  Long term goal 5: pt will maintain posterior hip precautions with all mobility tasks with <10% VC  Patient Goals   Patient goals : \"go home\"    PLAN    Safety Devices  Type of devices: All fall risk precautions in place;Call light within reach; Chair alarm in place; Left in chair     AMPA (6 CLICK) Cordell Morris 28 Inpatient Mobility Raw Score : 18      Therapy Time   Individual   Time In 1615   Time Out 1625   Minutes 10      Therex: [address], 02/08/20 at 4:54 PM

## 2020-02-09 LAB
ANION GAP SERPL CALCULATED.3IONS-SCNC: 12 MEQ/L (ref 9–15)
BASOPHILS ABSOLUTE: 0.1 K/UL (ref 0–0.2)
BASOPHILS RELATIVE PERCENT: 0.7 %
BLOOD BANK DISPENSE STATUS: NORMAL
BLOOD BANK PRODUCT CODE: NORMAL
BPU ID: NORMAL
BUN BLDV-MCNC: 6 MG/DL (ref 6–20)
CALCIUM SERPL-MCNC: 9 MG/DL (ref 8.5–9.9)
CHLORIDE BLD-SCNC: 100 MEQ/L (ref 95–107)
CO2: 26 MEQ/L (ref 20–31)
CREAT SERPL-MCNC: 0.56 MG/DL (ref 0.7–1.2)
DESCRIPTION BLOOD BANK: NORMAL
EOSINOPHILS ABSOLUTE: 0.6 K/UL (ref 0–0.7)
EOSINOPHILS RELATIVE PERCENT: 4.2 %
GFR AFRICAN AMERICAN: >60
GFR NON-AFRICAN AMERICAN: >60
GLUCOSE BLD-MCNC: 95 MG/DL (ref 70–99)
HCT VFR BLD CALC: 22.4 % (ref 42–52)
HEMOGLOBIN: 7.3 G/DL (ref 14–18)
LYMPHOCYTES ABSOLUTE: 2.1 K/UL (ref 1–4.8)
LYMPHOCYTES RELATIVE PERCENT: 15.5 %
MCH RBC QN AUTO: 27.8 PG (ref 27–31.3)
MCHC RBC AUTO-ENTMCNC: 32.7 % (ref 33–37)
MCV RBC AUTO: 85 FL (ref 80–100)
MONOCYTES ABSOLUTE: 2.2 K/UL (ref 0.2–0.8)
MONOCYTES RELATIVE PERCENT: 16.7 %
NEUTROPHILS ABSOLUTE: 8.4 K/UL (ref 1.4–6.5)
NEUTROPHILS RELATIVE PERCENT: 62.9 %
PDW BLD-RTO: 15 % (ref 11.5–14.5)
PLATELET # BLD: 333 K/UL (ref 130–400)
POTASSIUM SERPL-SCNC: 4.1 MEQ/L (ref 3.4–4.9)
RBC # BLD: 2.64 M/UL (ref 4.7–6.1)
SODIUM BLD-SCNC: 138 MEQ/L (ref 135–144)
WBC # BLD: 13.4 K/UL (ref 4.8–10.8)

## 2020-02-09 PROCEDURE — 6370000000 HC RX 637 (ALT 250 FOR IP): Performed by: INTERNAL MEDICINE

## 2020-02-09 PROCEDURE — 6370000000 HC RX 637 (ALT 250 FOR IP): Performed by: NURSE PRACTITIONER

## 2020-02-09 PROCEDURE — 6370000000 HC RX 637 (ALT 250 FOR IP): Performed by: ORTHOPAEDIC SURGERY

## 2020-02-09 PROCEDURE — 2580000003 HC RX 258: Performed by: ORTHOPAEDIC SURGERY

## 2020-02-09 PROCEDURE — 85025 COMPLETE CBC W/AUTO DIFF WBC: CPT

## 2020-02-09 PROCEDURE — 36592 COLLECT BLOOD FROM PICC: CPT

## 2020-02-09 PROCEDURE — 99232 SBSQ HOSP IP/OBS MODERATE 35: CPT | Performed by: INTERNAL MEDICINE

## 2020-02-09 PROCEDURE — 6360000002 HC RX W HCPCS: Performed by: INTERNAL MEDICINE

## 2020-02-09 PROCEDURE — 97110 THERAPEUTIC EXERCISES: CPT

## 2020-02-09 PROCEDURE — 1210000000 HC MED SURG R&B

## 2020-02-09 PROCEDURE — 80048 BASIC METABOLIC PNL TOTAL CA: CPT

## 2020-02-09 PROCEDURE — 97116 GAIT TRAINING THERAPY: CPT

## 2020-02-09 PROCEDURE — 2580000003 HC RX 258: Performed by: NURSE PRACTITIONER

## 2020-02-09 RX ORDER — 0.9 % SODIUM CHLORIDE 0.9 %
20 INTRAVENOUS SOLUTION INTRAVENOUS ONCE
Status: COMPLETED | OUTPATIENT
Start: 2020-02-09 | End: 2020-02-09

## 2020-02-09 RX ADMIN — ACETAMINOPHEN 650 MG: 325 TABLET ORAL at 14:55

## 2020-02-09 RX ADMIN — ASPIRIN 81 MG: 81 TABLET, COATED ORAL at 08:14

## 2020-02-09 RX ADMIN — SENNOSIDES AND DOCUSATE SODIUM 1 TABLET: 8.6; 5 TABLET ORAL at 08:13

## 2020-02-09 RX ADMIN — CEFAZOLIN SODIUM 2 G: 2 SOLUTION INTRAVENOUS at 18:02

## 2020-02-09 RX ADMIN — SENNOSIDES AND DOCUSATE SODIUM 1 TABLET: 8.6; 5 TABLET ORAL at 20:06

## 2020-02-09 RX ADMIN — Medication 10 ML: at 08:15

## 2020-02-09 RX ADMIN — ASPIRIN 81 MG: 81 TABLET, COATED ORAL at 20:06

## 2020-02-09 RX ADMIN — LACTOBACILLUS TAB 1 TABLET: TAB at 08:13

## 2020-02-09 RX ADMIN — MAGNESIUM HYDROXIDE 30 ML: 400 SUSPENSION ORAL at 16:01

## 2020-02-09 RX ADMIN — Medication 10 ML: at 08:17

## 2020-02-09 RX ADMIN — DOCUSATE SODIUM 100 MG: 100 CAPSULE, LIQUID FILLED ORAL at 20:04

## 2020-02-09 RX ADMIN — OXYCODONE HYDROCHLORIDE 5 MG: 5 TABLET ORAL at 14:55

## 2020-02-09 RX ADMIN — OXYCODONE HYDROCHLORIDE 5 MG: 5 TABLET ORAL at 10:43

## 2020-02-09 RX ADMIN — SODIUM CHLORIDE 250 ML: 9 INJECTION, SOLUTION INTRAVENOUS at 10:48

## 2020-02-09 RX ADMIN — ACETAMINOPHEN 650 MG: 325 TABLET ORAL at 14:56

## 2020-02-09 RX ADMIN — OXYCODONE HYDROCHLORIDE 5 MG: 5 TABLET ORAL at 00:17

## 2020-02-09 RX ADMIN — Medication 10 ML: at 20:07

## 2020-02-09 RX ADMIN — OXYCODONE HYDROCHLORIDE 5 MG: 5 TABLET ORAL at 04:51

## 2020-02-09 RX ADMIN — DIAZEPAM 5 MG: 5 TABLET ORAL at 06:14

## 2020-02-09 RX ADMIN — CEFAZOLIN SODIUM 2 G: 2 SOLUTION INTRAVENOUS at 06:14

## 2020-02-09 RX ADMIN — Medication 10 ML: at 20:05

## 2020-02-09 RX ADMIN — LACTOBACILLUS TAB 1 TABLET: TAB at 20:06

## 2020-02-09 RX ADMIN — ACETAMINOPHEN 650 MG: 325 TABLET ORAL at 08:14

## 2020-02-09 RX ADMIN — BACLOFEN 10 MG: 10 TABLET ORAL at 08:13

## 2020-02-09 RX ADMIN — ACETAMINOPHEN 650 MG: 325 TABLET ORAL at 20:05

## 2020-02-09 RX ADMIN — BACLOFEN 10 MG: 10 TABLET ORAL at 14:55

## 2020-02-09 RX ADMIN — CEFAZOLIN SODIUM 2 G: 2 SOLUTION INTRAVENOUS at 10:44

## 2020-02-09 RX ADMIN — DOCUSATE SODIUM 100 MG: 100 CAPSULE, LIQUID FILLED ORAL at 08:13

## 2020-02-09 RX ADMIN — BACLOFEN 10 MG: 10 TABLET ORAL at 20:05

## 2020-02-09 RX ADMIN — OXYCODONE HYDROCHLORIDE 5 MG: 5 TABLET ORAL at 20:04

## 2020-02-09 RX ADMIN — ACETAMINOPHEN 650 MG: 325 TABLET ORAL at 01:42

## 2020-02-09 RX ADMIN — DIAZEPAM 5 MG: 5 TABLET ORAL at 00:06

## 2020-02-09 RX ADMIN — DIAZEPAM 5 MG: 5 TABLET ORAL at 18:17

## 2020-02-09 ASSESSMENT — PAIN DESCRIPTION - PAIN TYPE: TYPE: SURGICAL PAIN

## 2020-02-09 ASSESSMENT — PAIN SCALES - GENERAL
PAINLEVEL_OUTOF10: 7
PAINLEVEL_OUTOF10: 0
PAINLEVEL_OUTOF10: 7
PAINLEVEL_OUTOF10: 5
PAINLEVEL_OUTOF10: 7
PAINLEVEL_OUTOF10: 3
PAINLEVEL_OUTOF10: 0
PAINLEVEL_OUTOF10: 4
PAINLEVEL_OUTOF10: 7
PAINLEVEL_OUTOF10: 6

## 2020-02-09 ASSESSMENT — PAIN DESCRIPTION - LOCATION: LOCATION: HIP;KNEE

## 2020-02-09 ASSESSMENT — PAIN DESCRIPTION - DESCRIPTORS: DESCRIPTORS: ACHING;SORE

## 2020-02-09 NOTE — PROGRESS NOTES
Hospitalist Daily Progress Note  Name: Martine Snyder  Age: 37 y.o. Gender: male  CodeStatus: Full Code  Allergies: Tramadol    Chief Complaint:No chief complaint on file. Primary Care Provider: Nadege Briseno MD  InpatientTreatment Team: Treatment Team: Attending Provider: Malika Gonzalez MD; Consulting Physician: John Brasher DO; Consulting Physician: Viktoria Donaldson MD; Surgeon: Kayleen Hall MD; Surgeon: Malika Gonzalez MD; : Elisabeth Bustos, RAYMUNDO; Registered Nurse: Arvis Sandhoff, RN; Patient Care Tech: Devi Buchanan  Admission Date: 2/3/2020      Subjective: Pt is seen and evaluated at bedside. MSSA positive. Sensitive to all tested abx. Pending d/c antibiotic plan. Current plans are for home with home health to run abx. 2U RBC today for post operative anemia no S&S of bleeding    Physical Exam  Constitutional:       Appearance: Normal appearance. HENT:      Head: Normocephalic and atraumatic. Mouth/Throat:      Mouth: Mucous membranes are moist.      Pharynx: Oropharynx is clear. Eyes:      Conjunctiva/sclera: Conjunctivae normal.      Pupils: Pupils are equal, round, and reactive to light. Cardiovascular:      Rate and Rhythm: Normal rate and regular rhythm. Pulmonary:      Effort: Pulmonary effort is normal.      Breath sounds: No wheezing, rhonchi or rales. Abdominal:      General: There is no distension. Tenderness: There is no abdominal tenderness. There is no guarding. Musculoskeletal:      Comments: Right hip  Wound vac in place. Neurological:      Mental Status: He is alert. Review of Systems   Reason unable to perform ROS: 14 point ros reviewed and negative except for as above.         Medications:  Reviewed    Infusion Medications:    sodium chloride 50 mL/hr at 02/08/20 3708     Scheduled Medications:    baclofen  10 mg Oral TID    ceFAZolin (ANCEF) IVPB  2 g Intravenous Q8H    lactobacillus acidophilus  1 tablet Oral BID    sodium chloride flush  10 mL Intravenous 2 times per day    acetaminophen  650 mg Oral Q6H    docusate sodium  100 mg Oral BID    sennosides-docusate sodium  1 tablet Oral BID    aspirin  81 mg Oral BID    alteplase  1 mg Intracatheter Once    alteplase  1 mg Intracatheter Once    sodium chloride flush  10 mL Intravenous 2 times per day     PRN Meds: magnesium hydroxide, ondansetron, sodium chloride flush, oxyCODONE, diazepam, acetaminophen    Labs:   Recent Labs     02/07/20  0600 02/08/20  0703 02/09/20 0513   WBC 12.3* 12.5* 13.4*   HGB 7.2* 8.4* 7.3*   HCT 22.2* 25.7* 22.4*    298 333     Recent Labs     02/07/20 0600 02/08/20 0703 02/09/20 0513   * 135 138   K 3.7 3.8 4.1   CL 93* 99 100   CO2 27 24 26   BUN 8 6 6   CREATININE 0.57* 0.54* 0.56*   CALCIUM 8.3* 8.7 9.0     No results for input(s): AST, ALT, BILIDIR, BILITOT, ALKPHOS in the last 72 hours. No results for input(s): INR in the last 72 hours. No results for input(s): Tank Scherer in the last 72 hours. Urinalysis:   Lab Results   Component Value Date    NITRU Negative 02/07/2020    BLOODU Negative 02/07/2020    SPECGRAV 1.004 02/07/2020    GLUCOSEU Negative 02/07/2020       Radiology:   Most recent    Chest CT      WITH CONTRAST:No results found for this or any previous visit. WITHOUT CONTRAST: No results found for this or any previous visit. CXR      2-view: No results found for this or any previous visit. Portable: No results found for this or any previous visit. Echo No results found for this or any previous visit. Assessment/Plan:    Active Hospital Problems    Diagnosis Date Noted    Hip pain, acute, right [M25.551] 02/04/2020     Right septic hip: POD2 s/p explantation. MSSA, pan-sensitive. Continue ancef. Follow ID recommendations. Constipation: PRN bowel agents. Additional work up or/and treatment plan may be added today or then after based on clinical progression.  I am managing

## 2020-02-09 NOTE — PROGRESS NOTES
Pt is Alert and oriented X4,2/7/2020 Hbg 7.2 Hct 22.2, pt received two unit red blood cells 2/8/2020 and labs are as follows; 2/8/2020 hbg  8.4 , hct 25. 7. pt is calm and follows commands. He has been in 7/10 pain that is treated with roxicodone 5 mg and valium 5 mg, patient states relief from pain. Lung sounds are clear throughout, heart sound normal, bowel sounds in all four quadrants. The patient is drinking a lot of water and urine output is good. Patient denies any pain in his calf's, bilateral SCDs in place, capillary refills less than three bilateral lower extremities.

## 2020-02-09 NOTE — PROGRESS NOTES
assistance  Stand to sit: Stand by assistance  Comment: Good safety awareness    Ambulation  Ambulation?: Yes  WB Status: R LE 50 % WB, posterior hip precautions  Ambulation 1  Surface: level tile  Device: Rolling Walker  Assistance: Stand by assistance  Quality of Gait: Step to pattern, appropriate WBing throughout gait  Gait Deviations: Slow Brissa;Decreased step length  Distance: 50ftx2    Stairs/Curb  Stairs?: No         Neuromuscular Education  Neuromuscular Comments: Single stepping with Rt LE x10 at [employer]     Exercises  Hip Flexion: x10 in standing within safe range  Hip Abduction: x 10 AAROM  Ankle Pumps: Standing HR x10 at [employer]                               Post-Pain  Pain rating (0-10 scale):6/10  Location and pain description same as pre-treatment unless indicated. Action: [] N/A  [x] Nursing notified  [] Pt declined intervention    ASSESSMENT   Body structures, Functions, Activity limitations: Decreased functional mobility ; Decreased strength;Decreased coordination;Decreased high-level IADLs;Decreased ADL status; Decreased balance; Increased pain;Decreased ROM  Assessment: Pt able to increase ambulation distance this AM with good tolerance. Able to maintain WBing status throughout session. Educated pt on scenarios of maintaining hip precautions such as ADLs and hygiene.  Pt to receive blood transfusion in PM.   Prognosis: Good  REQUIRES PT FOLLOW UP: Yes     Discharge Recommendations:  Continue to assess pending progress    Goals  Long term goals  Long term goal 1: indep with bed mobility   Long term goal 2: functional transfers with supervision   Long term goal 3: amb 50ft with LRAD and supervision   Long term goal 4: 4 steps with B handrails to enter/exit home and SBA  Long term goal 5: pt will maintain posterior hip precautions with all mobility tasks with <10% VC  Patient Goals   Patient goals : \"go home\"    PLAN          [facility] (6 CLICK) BASIC MOBILITY  AM-PAC Inpatient Mobility Raw Score : 18     Therapy Time   Individual   Time In 0952   Time Out 1022   Minutes 30     Timed Code Treatment Minutes: 30 Minutes   Gait: 10  Trans: 10  There ex: [id number] Charly, J LUIS, 02/09/20 at 10:31 AM    Electronically signed by Divina Wilson PTA on 2/9/2020 at 10:31 AM

## 2020-02-09 NOTE — PROGRESS NOTES
Doing ok, pain better    avss    C/d/i  NVI    Anemia noted, prbc today  OOB with PT  Rehab planning  IV antibx per ID

## 2020-02-09 NOTE — PROGRESS NOTES
Infectious Disease Progress Note       2/9/2020    Patient is a hospital followup regarding R hip PJI with hx of abscess and MSSA infection in the setting of a patient with hx of splenectomy. We had a long discussion over the events that led to this. He showed me all the pictures of the hip on his phone. General: Patient in no acute distress, cooperative  Skin: no new rashes   HEENT: EOMI, MMM, Neck is supple  Heart: S1 S2  Lungs:bilaterally clear  Abdomen: soft, ND, +BS, NTTP  R hip vac in place. Extrem:+pulses, no calf pain, icing his knee. Neuro exam: CN II-XII intact      Lab Results   Component Value Date    WBC 13.4 (H) 02/09/2020    HGB 7.3 (L) 02/09/2020    HCT 22.4 (L) 02/09/2020    MCV 85.0 02/09/2020     02/09/2020     Lab Results   Component Value Date     02/09/2020    K 4.1 02/09/2020    K 3.7 02/07/2020     02/09/2020    CO2 26 02/09/2020    BUN 6 02/09/2020    CREATININE 0.56 02/09/2020    GLUCOSE 95 02/09/2020    GLUCOSE 100 05/06/2018    CALCIUM 9.0 02/09/2020        WBC trends are being monitored. Antibiotic doses are being adjusted per most recent renal labs. Vitals:    02/09/20 1052   BP: 121/71   Pulse: 95   Resp: 18   Temp: 97.9 °F (36.6 °C)   SpO2:            Patient Active Problem List   Diagnosis    Hodgkin lymphoma ([facility] [address]    H/O splenectomy    Regular astigmatism    Tear film insufficiency    Osteoarthritis of right hip    Status post total replacement of right hip    Hip pain, acute, right           ASSESSMENT:  R hip abscess and PJI  MSSA infection  H/o splenectomy      PLAN:  Patient has decided on [facility] instead of outpatient infusions. No dc today due to transfusion. Currently on Ancef and tolerating well. Discussed with primary yesterday    Imaging and labs were reviewed per medical records and any ID pertinent labs were addressed with the patient.        Rohini Hernandez DO

## 2020-02-10 VITALS
SYSTOLIC BLOOD PRESSURE: 124 MMHG | HEIGHT: 74 IN | TEMPERATURE: 98.6 F | DIASTOLIC BLOOD PRESSURE: 65 MMHG | BODY MASS INDEX: 30.25 KG/M2 | HEART RATE: 84 BPM | WEIGHT: 235.7 LBS | OXYGEN SATURATION: 100 % | RESPIRATION RATE: 18 BRPM

## 2020-02-10 LAB
ANION GAP SERPL CALCULATED.3IONS-SCNC: 14 MEQ/L (ref 9–15)
BASOPHILS ABSOLUTE: 0.1 K/UL (ref 0–0.2)
BASOPHILS RELATIVE PERCENT: 1 %
BLOOD CULTURE, ROUTINE: NORMAL
BUN BLDV-MCNC: 7 MG/DL (ref 6–20)
CALCIUM SERPL-MCNC: 9.2 MG/DL (ref 8.5–9.9)
CHLORIDE BLD-SCNC: 99 MEQ/L (ref 95–107)
CO2: 27 MEQ/L (ref 20–31)
CREAT SERPL-MCNC: 0.56 MG/DL (ref 0.7–1.2)
CULTURE, BLOOD 2: NORMAL
EOSINOPHILS ABSOLUTE: 0.7 K/UL (ref 0–0.7)
EOSINOPHILS RELATIVE PERCENT: 6.3 %
GFR AFRICAN AMERICAN: >60
GFR NON-AFRICAN AMERICAN: >60
GLUCOSE BLD-MCNC: 88 MG/DL (ref 70–99)
HCT VFR BLD CALC: 29 % (ref 42–52)
HEMOGLOBIN: 9.8 G/DL (ref 14–18)
LYMPHOCYTES ABSOLUTE: 1.7 K/UL (ref 1–4.8)
LYMPHOCYTES RELATIVE PERCENT: 16.2 %
MCH RBC QN AUTO: 29.1 PG (ref 27–31.3)
MCHC RBC AUTO-ENTMCNC: 33.7 % (ref 33–37)
MCV RBC AUTO: 86.2 FL (ref 80–100)
MONOCYTES ABSOLUTE: 1.7 K/UL (ref 0.2–0.8)
MONOCYTES RELATIVE PERCENT: 15.5 %
NEUTROPHILS ABSOLUTE: 6.6 K/UL (ref 1.4–6.5)
NEUTROPHILS RELATIVE PERCENT: 61 %
PDW BLD-RTO: 15.2 % (ref 11.5–14.5)
PLATELET # BLD: 385 K/UL (ref 130–400)
POTASSIUM SERPL-SCNC: 4.2 MEQ/L (ref 3.4–4.9)
RBC # BLD: 3.36 M/UL (ref 4.7–6.1)
SODIUM BLD-SCNC: 140 MEQ/L (ref 135–144)
WBC # BLD: 10.7 K/UL (ref 4.8–10.8)

## 2020-02-10 PROCEDURE — 97535 SELF CARE MNGMENT TRAINING: CPT

## 2020-02-10 PROCEDURE — 6360000002 HC RX W HCPCS: Performed by: INTERNAL MEDICINE

## 2020-02-10 PROCEDURE — 99232 SBSQ HOSP IP/OBS MODERATE 35: CPT | Performed by: INTERNAL MEDICINE

## 2020-02-10 PROCEDURE — 80048 BASIC METABOLIC PNL TOTAL CA: CPT

## 2020-02-10 PROCEDURE — 97116 GAIT TRAINING THERAPY: CPT

## 2020-02-10 PROCEDURE — 6370000000 HC RX 637 (ALT 250 FOR IP): Performed by: NURSE PRACTITIONER

## 2020-02-10 PROCEDURE — 85025 COMPLETE CBC W/AUTO DIFF WBC: CPT

## 2020-02-10 PROCEDURE — 6370000000 HC RX 637 (ALT 250 FOR IP): Performed by: INTERNAL MEDICINE

## 2020-02-10 PROCEDURE — 2580000003 HC RX 258: Performed by: ORTHOPAEDIC SURGERY

## 2020-02-10 PROCEDURE — 6370000000 HC RX 637 (ALT 250 FOR IP): Performed by: ORTHOPAEDIC SURGERY

## 2020-02-10 RX ORDER — SENNA AND DOCUSATE SODIUM 50; 8.6 MG/1; MG/1
1 TABLET, FILM COATED ORAL 2 TIMES DAILY
Qty: 60 TABLET | Refills: 0 | Status: SHIPPED | OUTPATIENT
Start: 2020-02-10 | End: 2020-02-20 | Stop reason: SDUPTHER

## 2020-02-10 RX ORDER — L. ACIDOPHILUS/L.BULGARICUS 1MM CELL
1 TABLET ORAL 2 TIMES DAILY
Qty: 60 TABLET | Refills: 0 | Status: SHIPPED | OUTPATIENT
Start: 2020-02-10 | End: 2020-03-11

## 2020-02-10 RX ORDER — BACLOFEN 10 MG/1
10 TABLET ORAL 3 TIMES DAILY
Qty: 30 TABLET | Refills: 0 | Status: SHIPPED | OUTPATIENT
Start: 2020-02-10 | End: 2020-02-20 | Stop reason: SDUPTHER

## 2020-02-10 RX ORDER — ASPIRIN 81 MG/1
81 TABLET ORAL 2 TIMES DAILY
Qty: 60 TABLET | Refills: 0 | Status: SHIPPED | OUTPATIENT
Start: 2020-02-10 | End: 2020-11-10

## 2020-02-10 RX ORDER — OXYCODONE HYDROCHLORIDE 5 MG/1
5 TABLET ORAL EVERY 4 HOURS PRN
Qty: 40 TABLET | Refills: 0 | Status: SHIPPED | OUTPATIENT
Start: 2020-02-10 | End: 2020-02-17

## 2020-02-10 RX ORDER — CEFAZOLIN SODIUM 2 G/50ML
2 SOLUTION INTRAVENOUS EVERY 8 HOURS
Qty: 30 EACH | Refills: 0 | Status: SHIPPED | OUTPATIENT
Start: 2020-02-10 | End: 2020-03-26

## 2020-02-10 RX ORDER — DIAZEPAM 5 MG/1
5 TABLET ORAL EVERY 6 HOURS PRN
Qty: 28 TABLET | Refills: 0 | Status: SHIPPED | OUTPATIENT
Start: 2020-02-10 | End: 2020-02-17

## 2020-02-10 RX ADMIN — DOCUSATE SODIUM 100 MG: 100 CAPSULE, LIQUID FILLED ORAL at 08:30

## 2020-02-10 RX ADMIN — ACETAMINOPHEN 650 MG: 325 TABLET ORAL at 08:32

## 2020-02-10 RX ADMIN — CEFAZOLIN SODIUM 2 G: 2 SOLUTION INTRAVENOUS at 01:51

## 2020-02-10 RX ADMIN — OXYCODONE HYDROCHLORIDE 5 MG: 5 TABLET ORAL at 14:21

## 2020-02-10 RX ADMIN — LACTOBACILLUS TAB 1 TABLET: TAB at 08:30

## 2020-02-10 RX ADMIN — ACETAMINOPHEN 650 MG: 325 TABLET ORAL at 14:18

## 2020-02-10 RX ADMIN — ACETAMINOPHEN 650 MG: 325 TABLET ORAL at 01:51

## 2020-02-10 RX ADMIN — DIAZEPAM 5 MG: 5 TABLET ORAL at 01:51

## 2020-02-10 RX ADMIN — OXYCODONE HYDROCHLORIDE 5 MG: 5 TABLET ORAL at 05:37

## 2020-02-10 RX ADMIN — OXYCODONE HYDROCHLORIDE 5 MG: 5 TABLET ORAL at 18:39

## 2020-02-10 RX ADMIN — BACLOFEN 10 MG: 10 TABLET ORAL at 14:19

## 2020-02-10 RX ADMIN — ASPIRIN 81 MG: 81 TABLET, COATED ORAL at 08:30

## 2020-02-10 RX ADMIN — BACLOFEN 10 MG: 10 TABLET ORAL at 08:30

## 2020-02-10 RX ADMIN — CEFAZOLIN SODIUM 2 G: 2 SOLUTION INTRAVENOUS at 09:54

## 2020-02-10 RX ADMIN — SODIUM CHLORIDE: 9 INJECTION, SOLUTION INTRAVENOUS at 00:05

## 2020-02-10 RX ADMIN — MAGNESIUM HYDROXIDE 30 ML: 400 SUSPENSION ORAL at 09:54

## 2020-02-10 RX ADMIN — CEFAZOLIN SODIUM 2 G: 2 SOLUTION INTRAVENOUS at 17:55

## 2020-02-10 RX ADMIN — OXYCODONE HYDROCHLORIDE 5 MG: 5 TABLET ORAL at 00:05

## 2020-02-10 RX ADMIN — SENNOSIDES AND DOCUSATE SODIUM 1 TABLET: 8.6; 5 TABLET ORAL at 08:32

## 2020-02-10 ASSESSMENT — PAIN DESCRIPTION - PAIN TYPE: TYPE: ACUTE PAIN

## 2020-02-10 ASSESSMENT — PAIN SCALES - GENERAL
PAINLEVEL_OUTOF10: 5
PAINLEVEL_OUTOF10: 7
PAINLEVEL_OUTOF10: 7
PAINLEVEL_OUTOF10: 5
PAINLEVEL_OUTOF10: 3
PAINLEVEL_OUTOF10: 7
PAINLEVEL_OUTOF10: 2
PAINLEVEL_OUTOF10: 6
PAINLEVEL_OUTOF10: 2
PAINLEVEL_OUTOF10: 5
PAINLEVEL_OUTOF10: 6
PAINLEVEL_OUTOF10: 4

## 2020-02-10 ASSESSMENT — PAIN DESCRIPTION - DESCRIPTORS: DESCRIPTORS: SORE

## 2020-02-10 NOTE — PROGRESS NOTES
Hip surgery    Progress Note    Subjective:     Post-Operative Day: 4 Status Post right Hip Arthroplasty- removal and revision into antibiotic spacer  Systemic or Specific Complaints:No Complaints    Objective:     CURRENT VITALS:  /65   Pulse 84   Temp 98.6 °F (37 °C) (Oral)   Resp 18   Ht 6' 2\" (1.88 m)   Wt 235 lb 11.2 oz (106.9 kg)   SpO2 100%   BMI 30.26 kg/m²     General: alert, appears stated age and cooperative   Wound: Wound clean and dry no evidence of infection. Previna on   Motion: Painful range of Motion   DVT Exam: No evidence of DVT seen on physical exam.       NVI in lower extremity. Thigh swollen but soft. Moving foot and ankle. Data Review  Recent Labs     02/08/20  0703 02/09/20  0513 02/10/20  0536   WBC 12.5* 13.4* 10.7   RBC 3.04* 2.64* 3.36*   HGB 8.4* 7.3* 9.8*   HCT 25.7* 22.4* 29.0*   MCV 84.4 85.0 86.2   MCH 27.7 27.8 29.1   MCHC 32.8* 32.7* 33.7   RDW 15.0* 15.0* 15.2*    333 385       Assessment:     Status Post right Hip Arthroplasty removal and revision into antibiotic spacer. Doing well postoperatively. Pt wa transfused multiple times- but his hgb is stable now.  No visible drainage form wound/ ok for d/ c home with home health   antibiotics per ID    Plan:      1: Continues current post-op course, d/c today if possible :  2:  Continue Deep venous thrombosis prophylaxis  3:  Continue physical therapy  4:  Continue Pain Control

## 2020-02-10 NOTE — FLOWSHEET NOTE
Dr Langford March aware that patient will miss his 2am dose of Ancef tonight while transitioning to [facility].

## 2020-02-10 NOTE — CARE COORDINATION
Call placed to Ravi Valdovinos of [facility] to check on status. Electronically signed by Vance Wilder RN on 2/10/2020 at 2:31 PM      Faxed info to [facility]. Discharge pkt and dressing change orders. Call placed to multiple agencies without success. [facility] does service the area and will call back if able to accept due to limitied staffing in that area. Electronically signed by Vance Wilder RN on 2/10/2020 at 10:34 AM    IV benefit ck for Ancef on chart. 127.19/wk. Will need [place] [id number] agency that will cover [facility].  Case Management will continue to follow   Electronically signed by Vance Wilder RN on 2/10/2020 at 7:13 AM

## 2020-02-10 NOTE — PROGRESS NOTES
Infectious Diseases Inpatient Progress Note          HISTORY OF PRESENT ILLNESS:  Follow up R hip abscess, PJI on IV Ancef, well tolerated. Patient has no pain, no fever. S/P R hip PJ explant. R hip pain is well controlled, resolved dysuria. Current Medications:     baclofen  10 mg Oral TID    ceFAZolin (ANCEF) IVPB  2 g Intravenous Q8H    lactobacillus acidophilus  1 tablet Oral BID    sodium chloride flush  10 mL Intravenous 2 times per day    acetaminophen  650 mg Oral Q6H    docusate sodium  100 mg Oral BID    sennosides-docusate sodium  1 tablet Oral BID    aspirin  81 mg Oral BID    alteplase  1 mg Intracatheter Once    alteplase  1 mg Intracatheter Once    sodium chloride flush  10 mL Intravenous 2 times per day       Allergies:  Tramadol      Review of Systems  14 system review is negative other than HPI    Physical Exam  Vitals:    02/09/20 1500 02/09/20 1605 02/09/20 1909 02/10/20 0732   BP: 125/66 118/65 124/65    Pulse: 87 85 84    Resp:   18    Temp: 97.3 °F (36.3 °C) 97.7 °F (36.5 °C) 98.6 °F (37 °C)    TempSrc:   Oral Oral   SpO2: 100% 98% 100%    Weight:       Height:         General Appearance: alert and oriented to person, place and time, well-developed and well-nourished, in no acute distress  Skin: warm and dry, no rash. Head: normocephalic and atraumatic  Eyes: anicteric sclerae  ENT: oropharynx clear and moist with normal mucous membranes.  No oral thrush  Lungs: normal respiratory effort  Abdomen: soft, no tenderness  No leg edema  No erythema, no tenderness  Intact R hip portable VAC    DATA:    Lab Results   Component Value Date    WBC 10.7 02/10/2020    HGB 9.8 (L) 02/10/2020    HCT 29.0 (L) 02/10/2020    MCV 86.2 02/10/2020     02/10/2020     Lab Results   Component Value Date    CREATININE 0.56 (L) 02/10/2020    BUN 7 02/10/2020     02/10/2020    K 4.2 02/10/2020    CL 99 02/10/2020    CO2 27 02/10/2020       Hepatic Function Panel:  Lab Results   Component Value Date    ALKPHOS 106 02/03/2020    ALT 12 02/03/2020    AST 22 02/03/2020    PROT 8.9 02/03/2020    BILITOT <0.2 02/03/2020    LABALBU 3.9 02/03/2020    LABALBU 3.8 05/06/2018       Microbiology:   No results for input(s): BC in the last 72 hours.   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

## 2020-02-10 NOTE — PROGRESS NOTES
Physical Therapy Med Surg Daily Treatment Note  [facility]       NAME: Martine Snyder  : 1980 (37 y.o.)  MRN: [id number]  CODE STATUS: Full Code    Date of Service: 2/10/2020    Patient Diagnosis(es): Hip pain, acute, right [M25.551]   No chief complaint on file. Patient Active Problem List    Diagnosis Date Noted    Hip pain, acute, right 2020    Status post total replacement of right hip 2018    Osteoarthritis of right hip 2017    Hodgkin lymphoma ([facility] [address] 2015    H/O splenectomy 2015    Regular astigmatism 2014    Tear film insufficiency 2014        Past Medical History:   Diagnosis Date    Cancer [facility]) 1993    Hodgkins Lymphoma     Past Surgical History:   Procedure Laterality Date    HIP SURGERY Right 2020    RIGHT HIP  INCISION AND DRAINAGE performed by Kayleen Hall MD at [address] [facility] Right 2018    RIGHT HIP TOTAL HIP ARTHROPLASTY LITTLE performed by Malika Gonzalez MD at [address] [facility]            Restrictions  Restrictions/Precautions: Weight Bearing; Fall Risk  Lower Extremity Weight Bearing Restrictions  Right Lower Extremity Weight Bearing: Partial Weight Bearing  Partial Weight Bearing Percentage Or Pounds: 50% weightbearing  Position Activity Restriction  Hip Precautions: Posterior hip precautions    SUBJECTIVE   Subjective  Subjective: I'm doing pretty good. I'm afraid to put too much weight on this leg. General Comment  Comments: POD #3 R Hip Arthroplasty-removal and antibiotic spacer placement;  Post Hip Prec.; 50% WB    Pre-Session Pain Report  Pre Treatment Pain Screening  Pain at present: 4  Intervention List: Patient able to continue with treatment          Post-Session Pain Report  Pain Assessment  Pain Level: 2  Pain Type: Acute pain  Pain Location: (Heels)  Pain Descriptors: Sore OBJECTIVE        Bed mobility  Supine to Sit: Stand by assistance;Supervision  Comment: increased time to complete; Able to maintain hip prec. Transfers  Sit to Stand: Stand by assistance  Stand to sit: Stand by assistance  Bed to Chair: Stand by assistance  Comment: vc's for hip precautions; Maintains PWB appropriately    Ambulation 1  Surface: level tile  Device: Rolling Walker  Assistance: Stand by assistance  Quality of Gait: Step-to patter with no heel down and slightly FF posture; vc's to normalize gait pattern with increased heel strike and knee flexion on RLE  Gait Deviations: Slow Brissa  Distance: 50'x2              Neuromuscular Education  Neuromuscular Comments: Standing balance activity - reaching, rotation - vc's for hip precautions and safety     Exercises  Comments: Reviewed HEP and educated on hip precaution while performing                    Activity Tolerance  Activity Tolerance: Patient Tolerated treatment well          ASSESSMENT   Assessment: Pt able to maintain PWB with ambulation; cueing required to normalize pattern especially with heel strike and kne flexion; discussed seated hip precautions at length (90* bending)     Discharge Recommendations:  Continue to assess pending progress    Goals  Long term goals  Long term goal 1: indep with bed mobility   Long term goal 2: functional transfers with supervision   Long term goal 3: amb 50ft with LRAD and supervision   Long term goal 4: 4 steps with B handrails to enter/exit home and SBA  Long term goal 5: pt will maintain posterior hip precautions with all mobility tasks with <10% VC  Patient Goals   Patient goals : \"go home\"    PLAN    Plan  Plan Comment: Cont. POC  Safety Devices  Type of devices: All fall risk precautions in place;Call light within reach; Chair alarm in place; Left in chair     [facility] (6 CLICK) BASIC MOBILITY  AM-PAC Inpatient Mobility Raw Score : 18      Therapy Time   Individual   Time In 1015   Time Out 1049

## 2020-02-10 NOTE — PROGRESS NOTES
Comprehension, Alert,awake and oriented. NL CN. Symetrical tone and reflexes. Psychiatric: Alert and oriented, thought content appropriate, normal insight  Capillary Refill: Brisk,< 3 seconds   Peripheral Pulses: +2 palpable, equal bilaterally       Labs:   Recent Labs     02/08/20  0703 02/09/20  0513 02/10/20  0536   WBC 12.5* 13.4* 10.7   HGB 8.4* 7.3* 9.8*   HCT 25.7* 22.4* 29.0*    333 385     Recent Labs     02/08/20  0703 02/09/20  0513 02/10/20  0536    138 140   K 3.8 4.1 4.2   CL 99 100 99   CO2 24 26 27   BUN 6 6 7   CREATININE 0.54* 0.56* 0.56*   CALCIUM 8.7 9.0 9.2     No results for input(s): AST, ALT, BILIDIR, BILITOT, ALKPHOS in the last 72 hours. No results for input(s): INR in the last 72 hours. No results for input(s): Murleen Iba in the last 72 hours. Urinalysis:      Lab Results   Component Value Date    NITRU Negative 02/07/2020    BLOODU Negative 02/07/2020    SPECGRAV 1.004 02/07/2020    GLUCOSEU Negative 02/07/2020       Radiology:  XR HIP RIGHT (1 VIEW)   Final Result      Interval removal of right total hip arthroplasty and placement of right hip antibiotic spacer. IR FLUORO GUIDED CVA DEVICE REPLACEMENT   Final Result      IR PICC WO SQ PORT/PUMP > 5 YEARS   Final Result              Assessment/Plan:    Active Hospital Problems    Diagnosis Date Noted    Hip pain, acute, right [M25.551] 02/04/2020      1) Septic right hip joint: S/p explantation POD#3. MSSA pan sensitive    2) Constipation    3) Post operative anemia    Plan:  - ID on consult  - Continue Ancef   - Continue current bowel regimen  - Monitor H/H, transfuse for Hb < 7      Additional work up or/and treatment plan may be added today or then after based on clinical progression. I am managing a portion of pt care. Some medical issues are handled by other specialists. Additional work up and treatment should be done in out pt setting by pt PCP and other out pt providers.      In addition to examining

## 2020-02-11 NOTE — DISCHARGE SUMMARY
Discharge summary  This patient Filipe Cedeño was admitted to the hospital on 2/3/2020  after being sent form dr office with hip swelling and redness. Pt's hip was washed pout and culturs were obtained by Dr KAM Hill Listen- cx were positive and pt was taken back to surgery by Dr Lady Santoyo for  Procedure(s) (LRB):  RIGHT HIP TOTAL ARTHROPLASTY removal of implant  and placement of antibiotic spacer, SUMIT CUP OSTEOTOME, LITTLE ARTICULATED SPACER, SYNTHES CABLES, ROOM 272 (Right) without complications that morning. During the postoperative period,while in hospital, patient was medically managed by the hospitalist. Please see medial notes and H&P for patients additional diagnoses. Ortho agrees with all medical diagnoses and treatments while patient in hospital.  No significant or unexpected findings or abnormal ortho imaging were noted during the hospital stay. Pt also had ID on for antibiotic management     Hospital course  Patient tolerated surgical procedure well and there was no complications. Patient progressed adequtly through their recovery during hospital stay including PT and rehabilitation. DVT prophylaxis was implemented POD#1  Patient was then D/C on 2/10/2020 to Home- home with [place] [id number]  in stable condition. Patient was instructed on the use of pain medications, the signs and symptoms of infection, and was given our number to call should they have any questions or concerns following discharge.

## 2020-02-19 ENCOUNTER — TELEPHONE (OUTPATIENT)
Dept: INFECTIOUS DISEASES | Age: 40
End: 2020-02-19

## 2020-02-19 NOTE — TELEPHONE ENCOUNTER
Received potassium 5.8  Reviewed with Dr. Scott Villegas. Per Dr. Scott Villegas, repeat lab. Call placed to pt. Advised of abnormal lab and need to repeat lab. Pt states he is following up with PCP tomorrow. Lab order placed and pt will get lab while at his PCP appt. Tomorrow. Explained to pt if any issues to call immediately. Pt verbalized understanding. Pt denies any other needs at this time.

## 2020-02-20 ENCOUNTER — OFFICE VISIT (OUTPATIENT)
Dept: INTERNAL MEDICINE | Age: 40
End: 2020-02-20
Payer: COMMERCIAL

## 2020-02-20 VITALS
HEART RATE: 80 BPM | BODY MASS INDEX: 29.13 KG/M2 | DIASTOLIC BLOOD PRESSURE: 70 MMHG | WEIGHT: 227 LBS | HEIGHT: 74 IN | OXYGEN SATURATION: 98 % | SYSTOLIC BLOOD PRESSURE: 120 MMHG

## 2020-02-20 DIAGNOSIS — E87.5 SERUM POTASSIUM ELEVATED: ICD-10-CM

## 2020-02-20 LAB
ANION GAP SERPL CALCULATED.3IONS-SCNC: 13 MEQ/L (ref 9–15)
BUN BLDV-MCNC: 13 MG/DL (ref 6–20)
CALCIUM SERPL-MCNC: 10.3 MG/DL (ref 8.5–9.9)
CHLORIDE BLD-SCNC: 98 MEQ/L (ref 95–107)
CO2: 27 MEQ/L (ref 20–31)
CREAT SERPL-MCNC: 0.68 MG/DL (ref 0.7–1.2)
GFR AFRICAN AMERICAN: >60
GFR NON-AFRICAN AMERICAN: >60
GLUCOSE BLD-MCNC: 74 MG/DL (ref 70–99)
POTASSIUM SERPL-SCNC: 4.5 MEQ/L (ref 3.4–4.9)
SODIUM BLD-SCNC: 138 MEQ/L (ref 135–144)

## 2020-02-20 PROCEDURE — 99214 OFFICE O/P EST MOD 30 MIN: CPT | Performed by: FAMILY MEDICINE

## 2020-02-20 RX ORDER — OXYCODONE HYDROCHLORIDE 5 MG/1
5 TABLET ORAL 2 TIMES DAILY PRN
Qty: 60 TABLET | Refills: 0 | Status: SHIPPED | OUTPATIENT
Start: 2020-02-20 | End: 2020-03-16

## 2020-02-20 RX ORDER — BACLOFEN 10 MG/1
10 TABLET ORAL 3 TIMES DAILY
Qty: 90 TABLET | Refills: 0 | Status: SHIPPED | OUTPATIENT
Start: 2020-02-20 | End: 2020-03-01

## 2020-02-20 RX ORDER — OXYCODONE HYDROCHLORIDE 5 MG/1
5 TABLET ORAL EVERY 4 HOURS PRN
COMMUNITY
End: 2020-02-20 | Stop reason: SDUPTHER

## 2020-02-20 RX ORDER — DIAZEPAM 5 MG/1
5 TABLET ORAL EVERY 6 HOURS PRN
COMMUNITY
End: 2020-02-20 | Stop reason: SDUPTHER

## 2020-02-20 RX ORDER — DIAZEPAM 5 MG/1
5 TABLET ORAL NIGHTLY
Qty: 30 TABLET | Refills: 0 | Status: SHIPPED | OUTPATIENT
Start: 2020-02-20 | End: 2020-03-21

## 2020-02-20 RX ORDER — SENNA AND DOCUSATE SODIUM 50; 8.6 MG/1; MG/1
1 TABLET, FILM COATED ORAL 2 TIMES DAILY
Qty: 60 TABLET | Refills: 0 | Status: SHIPPED | OUTPATIENT
Start: 2020-02-20 | End: 2020-03-21

## 2020-02-20 ASSESSMENT — PATIENT HEALTH QUESTIONNAIRE - PHQ9
1. LITTLE INTEREST OR PLEASURE IN DOING THINGS: 0
2. FEELING DOWN, DEPRESSED OR HOPELESS: 0
SUM OF ALL RESPONSES TO PHQ9 QUESTIONS 1 & 2: 0
SUM OF ALL RESPONSES TO PHQ QUESTIONS 1-9: 0
SUM OF ALL RESPONSES TO PHQ QUESTIONS 1-9: 0

## 2020-02-20 ASSESSMENT — ENCOUNTER SYMPTOMS
EYE PAIN: 0
EYE ITCHING: 0
EYE DISCHARGE: 0

## 2020-03-16 ENCOUNTER — OFFICE VISIT (OUTPATIENT)
Dept: INFECTIOUS DISEASES | Age: 40
End: 2020-03-16
Payer: COMMERCIAL

## 2020-03-16 VITALS
SYSTOLIC BLOOD PRESSURE: 125 MMHG | HEIGHT: 74 IN | WEIGHT: 237 LBS | TEMPERATURE: 98.4 F | RESPIRATION RATE: 18 BRPM | HEART RATE: 74 BPM | DIASTOLIC BLOOD PRESSURE: 82 MMHG | BODY MASS INDEX: 30.42 KG/M2

## 2020-03-16 PROCEDURE — 99213 OFFICE O/P EST LOW 20 MIN: CPT | Performed by: INTERNAL MEDICINE

## 2020-03-16 RX ORDER — METAXALONE 800 MG/1
TABLET ORAL
COMMUNITY
Start: 2020-03-10 | End: 2020-11-10

## 2020-03-16 RX ORDER — FERROUS SULFATE 325(65) MG
325 TABLET ORAL 2 TIMES DAILY
Qty: 60 TABLET | Refills: 0 | Status: SHIPPED | OUTPATIENT
Start: 2020-03-16 | End: 2020-11-10

## 2020-03-16 RX ORDER — MELOXICAM 15 MG/1
TABLET ORAL
COMMUNITY
Start: 2020-03-10 | End: 2020-11-10

## 2020-03-26 ENCOUNTER — OFFICE VISIT (OUTPATIENT)
Dept: INTERNAL MEDICINE | Age: 40
End: 2020-03-26
Payer: COMMERCIAL

## 2020-03-26 VITALS
HEART RATE: 74 BPM | BODY MASS INDEX: 29.82 KG/M2 | DIASTOLIC BLOOD PRESSURE: 80 MMHG | OXYGEN SATURATION: 97 % | WEIGHT: 232.4 LBS | SYSTOLIC BLOOD PRESSURE: 116 MMHG | HEIGHT: 74 IN | TEMPERATURE: 98.5 F

## 2020-03-26 DIAGNOSIS — M00.9 PYOGENIC ARTHRITIS OF RIGHT HIP, DUE TO UNSPECIFIED ORGANISM (HCC): ICD-10-CM

## 2020-03-26 DIAGNOSIS — Z01.818 PREOP EXAMINATION: ICD-10-CM

## 2020-03-26 DIAGNOSIS — M16.11 PRIMARY OSTEOARTHRITIS OF RIGHT HIP: ICD-10-CM

## 2020-03-26 LAB
ALBUMIN SERPL-MCNC: 4.3 G/DL (ref 3.5–4.6)
ALP BLD-CCNC: 60 U/L (ref 35–104)
ALT SERPL-CCNC: 11 U/L (ref 0–41)
ANION GAP SERPL CALCULATED.3IONS-SCNC: 13 MEQ/L (ref 9–15)
APTT: 32.9 SEC (ref 24.4–36.8)
AST SERPL-CCNC: 26 U/L (ref 0–40)
BASOPHILS ABSOLUTE: 0 K/UL (ref 0–0.2)
BASOPHILS RELATIVE PERCENT: 0.8 %
BILIRUB SERPL-MCNC: 0.4 MG/DL (ref 0.2–0.7)
BILIRUBIN URINE: NEGATIVE
BLOOD, URINE: NEGATIVE
BUN BLDV-MCNC: 21 MG/DL (ref 6–20)
CALCIUM SERPL-MCNC: 9.6 MG/DL (ref 8.5–9.9)
CHLORIDE BLD-SCNC: 100 MEQ/L (ref 95–107)
CLARITY: CLEAR
CO2: 24 MEQ/L (ref 20–31)
COLOR: YELLOW
CREAT SERPL-MCNC: 0.65 MG/DL (ref 0.7–1.2)
EOSINOPHILS ABSOLUTE: 0.2 K/UL (ref 0–0.7)
EOSINOPHILS RELATIVE PERCENT: 4.6 %
GFR AFRICAN AMERICAN: >60
GFR NON-AFRICAN AMERICAN: >60
GLOBULIN: 3.7 G/DL (ref 2.3–3.5)
GLUCOSE BLD-MCNC: 77 MG/DL (ref 70–99)
GLUCOSE URINE: NEGATIVE MG/DL
HCT VFR BLD CALC: 42.9 % (ref 42–52)
HEMOGLOBIN: 13.8 G/DL (ref 14–18)
INR BLD: 1
KETONES, URINE: NEGATIVE MG/DL
LEUKOCYTE ESTERASE, URINE: NEGATIVE
LYMPHOCYTES ABSOLUTE: 1.6 K/UL (ref 1–4.8)
LYMPHOCYTES RELATIVE PERCENT: 30.1 %
MCH RBC QN AUTO: 28.9 PG (ref 27–31.3)
MCHC RBC AUTO-ENTMCNC: 32.1 % (ref 33–37)
MCV RBC AUTO: 90.1 FL (ref 80–100)
MONOCYTES ABSOLUTE: 0.8 K/UL (ref 0.2–0.8)
MONOCYTES RELATIVE PERCENT: 15.3 %
NEUTROPHILS ABSOLUTE: 2.6 K/UL (ref 1.4–6.5)
NEUTROPHILS RELATIVE PERCENT: 49.2 %
NITRITE, URINE: NEGATIVE
PDW BLD-RTO: 18.3 % (ref 11.5–14.5)
PH UA: 5 (ref 5–9)
PLATELET # BLD: 365 K/UL (ref 130–400)
POTASSIUM SERPL-SCNC: 4.8 MEQ/L (ref 3.4–4.9)
PROTEIN UA: NEGATIVE MG/DL
PROTHROMBIN TIME: 13.5 SEC (ref 12.3–14.9)
RBC # BLD: 4.77 M/UL (ref 4.7–6.1)
SODIUM BLD-SCNC: 137 MEQ/L (ref 135–144)
SPECIFIC GRAVITY UA: 1.02 (ref 1–1.03)
TOTAL PROTEIN: 8 G/DL (ref 6.3–8)
URINE REFLEX TO CULTURE: NORMAL
UROBILINOGEN, URINE: 0.2 E.U./DL
WBC # BLD: 5.4 K/UL (ref 4.8–10.8)

## 2020-03-26 PROCEDURE — 99212 OFFICE O/P EST SF 10 MIN: CPT | Performed by: FAMILY MEDICINE

## 2020-03-26 PROCEDURE — 93000 ELECTROCARDIOGRAM COMPLETE: CPT | Performed by: FAMILY MEDICINE

## 2020-03-26 ASSESSMENT — ENCOUNTER SYMPTOMS
DIARRHEA: 0
SORE THROAT: 0
CONSTIPATION: 0
RHINORRHEA: 0
WHEEZING: 0
COUGH: 0
ABDOMINAL PAIN: 0
SHORTNESS OF BREATH: 0

## 2020-03-26 NOTE — PROGRESS NOTES
[address] PRIMARY CARE  [address] [facility] [address]  Dept: [phone]  Dept Fax: [phone]: [phone]   Chief Complaint  Chief Complaint   Patient presents with    Other     PATIENT IN NEED OF MEDICAL CLEARANCE FOR SURGERY 4/6/2020       HPI:  37 y.o.male who presents for preop exam:    Denies CP or SOB. Pain has been well controlled; he is hoping to get back to work after the hip is healed. Nonsmoker. Hx of R hip prosthetic joint infection for which he has been seeing ID and receiving IV ancef recently completed with PICC removed; planning revision procedure with Dr. Audelia Kowalski in the near 4/6/2020.      Past Medical History:   Diagnosis Date    Cancer [facility]) 1993    Hodgkins Lymphoma     Past Surgical History:   Procedure Laterality Date    HIP SURGERY Right 2/4/2020    RIGHT HIP  INCISION AND DRAINAGE performed by Marlin Keen MD at [address] [facility] HIP ARTHROPLASTY Right 5/31/2018    RIGHT HIP TOTAL HIP ARTHROPLASTY LITTLE performed by Remington Hernandez MD at [address] [place] [address] Right 2/6/2020    RIGHT HIP TOTAL ARTHROPLASTY removal of implant  and placement of antibiotic spacer, SUMIT CUP OSTEOTOME, LITTLE ARTICULATED SPACER, SYNTHES CABLES, ROOM 272 performed by Remington Hernandez MD at [facility]       Social History     Socioeconomic History    Marital status: Single     Spouse name: Not on file    Number of children: 2    Years of education: Not on file    Highest education level: Not on file   Occupational History    Occupation: maintenance    Social Needs    Financial resource strain: Not on file    Food insecurity     Worry: Not on file     Inability: Not on file    Transportation needs     Medical: Not on file     Non-medical: Not on file   Tobacco Use    Smoking status: Never Smoker    Negative for abdominal pain, constipation and diarrhea. Endocrine: Negative for polydipsia and polyuria. Genitourinary: Negative for dysuria, frequency and urgency. Musculoskeletal: Positive for arthralgias. Neurological: Negative for syncope, light-headedness, numbness and headaches. Psychiatric/Behavioral: Negative for sleep disturbance. The patient is not nervous/anxious. Vitals:    03/26/20 0930   BP: 116/80   Site: Left Upper Arm   Position: Sitting   Cuff Size: Medium Adult   Pulse: 74   Temp: 98.5 °F (36.9 °C)   TempSrc: Oral   SpO2: 97%   Weight: 232 lb 6.4 oz (105.4 kg)   Height: 6' 2\" (1.88 m)       Physical exam:  Physical Exam  Vitals signs reviewed. Constitutional:       General: He is not in acute distress. Appearance: He is well-developed. HENT:      Head: Normocephalic and atraumatic. Right Ear: Tympanic membrane, ear canal and external ear normal.      Left Ear: Tympanic membrane, ear canal and external ear normal.      Mouth/Throat:      Pharynx: No oropharyngeal exudate. Neck:      Musculoskeletal: Normal range of motion. Thyroid: No thyromegaly. Cardiovascular:      Rate and Rhythm: Normal rate and regular rhythm. Heart sounds: Normal heart sounds. No murmur. Pulmonary:      Effort: Pulmonary effort is normal. No respiratory distress. Breath sounds: Normal breath sounds. No wheezing. Abdominal:      General: There is no distension. Palpations: Abdomen is soft. Tenderness: There is no abdominal tenderness. There is no guarding or rebound. Lymphadenopathy:      Cervical: No cervical adenopathy. Skin:     General: Skin is warm and dry. Neurological:      Mental Status: He is alert and oriented to person, place, and time. Psychiatric:         Behavior: Behavior normal.         Assessment/Plan:  37 y.o. male here mainly for preop exam:  - EKG and labs reviewed; He is acceptable risk for surgery.    - He would benefit from vaccination for meningitis/pneumoccocal in the future given his hx of asplenia. Diagnosis Orders   1. Preop examination  CBC With Auto Differential    Protime-Inr    EKG 12 lead    Comprehensive Metabolic Panel    Aptt    Urine Reflex to Culture    EKG 12 lead   2. Primary osteoarthritis of right hip  CBC With Auto Differential    Protime-Inr    EKG 12 lead    Comprehensive Metabolic Panel    Aptt    Urine Reflex to Culture    EKG 12 lead   3. Pyogenic arthritis of right hip, due to unspecified organism (HCC)  CBC With Auto Differential    Protime-Inr    EKG 12 lead    Comprehensive Metabolic Panel    Aptt    Urine Reflex to Culture    EKG 12 lead   4. Hodgkin lymphoma, unspecified Hodgkin lymphoma type, unspecified body region ([facility] [address]     5.  H/O splenectomy            Scott Geronimo MD

## 2020-11-10 ENCOUNTER — OFFICE VISIT (OUTPATIENT)
Dept: INTERNAL MEDICINE | Age: 40
End: 2020-11-10
Payer: COMMERCIAL

## 2020-11-10 VITALS
WEIGHT: 247.2 LBS | BODY MASS INDEX: 31.73 KG/M2 | HEART RATE: 73 BPM | TEMPERATURE: 98.6 F | DIASTOLIC BLOOD PRESSURE: 70 MMHG | HEIGHT: 74 IN | OXYGEN SATURATION: 98 % | SYSTOLIC BLOOD PRESSURE: 108 MMHG

## 2020-11-10 PROBLEM — T84.9XXA DISORDER OF JOINT PROSTHESIS (HCC): Status: ACTIVE | Noted: 2020-11-10

## 2020-11-10 PROBLEM — Z87.39 HISTORY OF ARTHRITIS: Status: ACTIVE | Noted: 2020-11-10

## 2020-11-10 PROBLEM — Z85.9 PERSONAL HISTORY OF MALIGNANT NEOPLASM: Status: ACTIVE | Noted: 2020-11-10

## 2020-11-10 PROCEDURE — 99396 PREV VISIT EST AGE 40-64: CPT | Performed by: FAMILY MEDICINE

## 2020-11-10 RX ORDER — PANTOPRAZOLE SODIUM 20 MG/1
20 TABLET, DELAYED RELEASE ORAL
Qty: 90 TABLET | Refills: 3 | Status: SHIPPED | OUTPATIENT
Start: 2020-11-10 | End: 2021-02-12 | Stop reason: SDUPTHER

## 2020-11-10 ASSESSMENT — ENCOUNTER SYMPTOMS
CHOKING: 0
CHEST TIGHTNESS: 0
APNEA: 0

## 2020-11-10 NOTE — PROGRESS NOTES
Patient: Mark Oviedo    YOB: 1980    Date: 11/10/20       Patient Active Problem List    Diagnosis Date Noted    Hip pain, acute, right 02/04/2020    Status post total replacement of right hip 05/31/2018    Osteoarthritis of right hip 12/22/2017    Hodgkin lymphoma ([address] 06/19/2015     Overview Note:     13 + years ago      H/O splenectomy 06/19/2015    Regular astigmatism 05/14/2014    Tear film insufficiency 05/14/2014     Past Medical History:   Diagnosis Date    Cancer [facility]) 1993    Hodgkins Lymphoma     Past Surgical History:   Procedure Laterality Date    HIP SURGERY Right 2/4/2020    RIGHT HIP  INCISION AND DRAINAGE performed by Vee Nichols MD at [address] [facility] [address] Right 5/31/2018    RIGHT HIP TOTAL HIP ARTHROPLASTY LITTLE performed by Clarisa Shaw MD at [address] [facility] Right 2/6/2020    RIGHT HIP TOTAL ARTHROPLASTY removal of implant  and placement of antibiotic spacer, SUMIT CUP OSTEOTOME, LITTLE ARTICULATED SPACER, SYNTHES CABLES, ROOM 272 performed by Clarisa Shaw MD at [address] [facility]       Family History   Problem Relation Age of Onset    Depression Father         suicide    Depression Sister         suicide    Stroke Maternal Grandfather     Cancer Maternal Grandmother         lung    Heart Attack Maternal Grandmother      Social History     Socioeconomic History    Marital status: Single     Spouse name: Not on file    Number of children: 2    Years of education: Not on file    Highest education level: Not on file   Occupational History    Occupation: maintenance    Social Needs    Financial resource strain: Not on file    Food insecurity     Worry: Not on file     Inability: Not on file    Transportation needs     Medical: Not on file     Non-medical: Not on file   Tobacco Use    Smoking status: Never Smoker    Smokeless tobacco: Former HBA1C)]  Lab Results   Component Value Date    CHOL 157 11/09/2017     Lab Results   Component Value Date    HDL 59 11/09/2017     No components found for: LDL  No components found for: TG]    Chief Complaint   Patient presents with    Annual Exam       HPI - Annual Physical Exam    Lateral epicondyle pain B/L, L>R  He works at [employer], 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  Ongoing for 1.5 months    GERD  He was on ranitidine but no longer available  Heartburn is worse recently     He jumped off dock and landed on sternum  Occurred last summer  Still gets pain in the area     Social History     Substance and Sexual Activity   Sexual Activity Not Currently    Partners: Female     Social History     Substance and Sexual Activity   Alcohol Use Yes    Comment: 1 case of beer throughout the week      Social History     Tobacco Use   Smoking Status Never Smoker   Smokeless Tobacco Former User    Types: Chew     Social History     Substance and Sexual Activity   Drug Use No         Review of Systems   Constitutional: Negative for activity change, appetite change and chills. HENT: Negative for congestion, dental problem and drooling. Respiratory: Negative for apnea, choking and chest tightness. Musculoskeletal: Negative for neck pain and neck stiffness. Elbow and sternum pain       Physical Exam  Vitals:    11/10/20 1221   BP: 108/70   Pulse: 73   Temp: 98.6 °F (37 °C)   SpO2: 98%   Body mass index is 31.74 kg/m². Physical Exam  Constitutional:  Appears well-developed and well-nourished. No distress. HENT:   Head: Normocephalic and atraumatic. Right Ear: External ear normal.   Left Ear: External ear normal.   Nose: Nose normal.   Eyes: Conjunctivae and EOM are normal.  Right eye exhibits no discharge. Left eye exhibits no discharge. No scleral icterus. Neck: Normal range of motion. Cardiovascular: Normal rate, regular rhythm and normal heart sounds. No murmur heard.   Pulmonary/Chest: Effort normal and breath sounds normal. No respiratory distress. no wheezes. no rales. no tenderness. Musculoskeletal: Normal range of motion. Neurological: alert. Skin: not diaphoretic. Psychiatric: normal mood and affect. behavior is normal. Judgment and thought content normal.   Nursing note and vitals reviewed. Localized tenderness over the lateral epicondyle and proximal wrist extensor muscle. Pain with resisted wrist extension with the elbow in full extension   Pain with passive terminal wrist flexion with the elbow in full extension        Assessment/Plan:  Tone Rowan was seen today for annual exam, pain, heartburn and other. Diagnoses and all orders for this visit:    Annual physical exam    Gastroesophageal reflux disease without esophagitis  -     pantoprazole (PROTONIX) 20 MG tablet; Take 1 tablet by mouth every morning (before breakfast)  hand out provided, had a lengthy discussion with patient about treatment, it's side effects, the diagnosis & etiology of symptoms, along with management and expectations of outcome with the recommended treatment. Patient verbalized understanding. Lateral epicondylitis of both elbows  hand out provided, had a lengthy discussion with patient about treatment, it's side effects, the diagnosis & etiology of symptoms, along with management and expectations of outcome with the recommended treatment. Patient verbalized understanding.        Return in about 1 year (around 11/10/2021) for Yearly Exam.

## 2020-11-10 NOTE — LETTER
[facility] Primary Care  [address] [address]  Phone: [phone]  Fax: [phone]    Eb Lo MD        November 10, 2020     Patient: Alexandrea Nichole   YOB: 1980   Date of Visit: 11/10/2020       To Whom it May Concern:    Petra Prado was seen in my clinic on 11/10/2020. He may return to work at his next scheduled shift with out restrictions. If you have any questions or concerns, please don't hesitate to call.     Sincerely,         Eb Lo MD

## 2021-02-12 ENCOUNTER — OFFICE VISIT (OUTPATIENT)
Dept: INTERNAL MEDICINE | Age: 41
End: 2021-02-12
Payer: COMMERCIAL

## 2021-02-12 VITALS
HEIGHT: 74 IN | SYSTOLIC BLOOD PRESSURE: 128 MMHG | OXYGEN SATURATION: 96 % | BODY MASS INDEX: 32.47 KG/M2 | HEART RATE: 95 BPM | TEMPERATURE: 98.3 F | DIASTOLIC BLOOD PRESSURE: 70 MMHG | WEIGHT: 253 LBS

## 2021-02-12 DIAGNOSIS — R59.1 LYMPHADENOPATHY: Primary | ICD-10-CM

## 2021-02-12 DIAGNOSIS — D17.21 LIPOMA OF RIGHT UPPER EXTREMITY: ICD-10-CM

## 2021-02-12 DIAGNOSIS — R59.1 LYMPHADENOPATHY: ICD-10-CM

## 2021-02-12 DIAGNOSIS — K21.9 GASTROESOPHAGEAL REFLUX DISEASE WITHOUT ESOPHAGITIS: ICD-10-CM

## 2021-02-12 LAB
BASOPHILS ABSOLUTE: 0.1 K/UL (ref 0–0.2)
BASOPHILS RELATIVE PERCENT: 0.6 %
EOSINOPHILS ABSOLUTE: 0.2 K/UL (ref 0–0.7)
EOSINOPHILS RELATIVE PERCENT: 2.3 %
HCT VFR BLD CALC: 45.6 % (ref 42–52)
HEMOGLOBIN: 15 G/DL (ref 14–18)
LYMPHOCYTES ABSOLUTE: 2.2 K/UL (ref 1–4.8)
LYMPHOCYTES RELATIVE PERCENT: 25.1 %
MCH RBC QN AUTO: 31.2 PG (ref 27–31.3)
MCHC RBC AUTO-ENTMCNC: 32.9 % (ref 33–37)
MCV RBC AUTO: 94.7 FL (ref 80–100)
MONOCYTES ABSOLUTE: 1.1 K/UL (ref 0.2–0.8)
MONOCYTES RELATIVE PERCENT: 11.8 %
NEUTROPHILS ABSOLUTE: 5.4 K/UL (ref 1.4–6.5)
NEUTROPHILS RELATIVE PERCENT: 60.2 %
PDW BLD-RTO: 13.7 % (ref 11.5–14.5)
PLATELET # BLD: 326 K/UL (ref 130–400)
RBC # BLD: 4.82 M/UL (ref 4.7–6.1)
WBC # BLD: 9 K/UL (ref 4.8–10.8)

## 2021-02-12 PROCEDURE — 99214 OFFICE O/P EST MOD 30 MIN: CPT | Performed by: PHYSICIAN ASSISTANT

## 2021-02-12 RX ORDER — PANTOPRAZOLE SODIUM 20 MG/1
20 TABLET, DELAYED RELEASE ORAL
Qty: 90 TABLET | Refills: 3 | Status: SHIPPED | OUTPATIENT
Start: 2021-02-12 | End: 2022-06-03 | Stop reason: SDUPTHER

## 2021-02-12 SDOH — ECONOMIC STABILITY: TRANSPORTATION INSECURITY
IN THE PAST 12 MONTHS, HAS LACK OF TRANSPORTATION KEPT YOU FROM MEETINGS, WORK, OR FROM GETTING THINGS NEEDED FOR DAILY LIVING?: NO

## 2021-02-12 SDOH — ECONOMIC STABILITY: FOOD INSECURITY: WITHIN THE PAST 12 MONTHS, THE FOOD YOU BOUGHT JUST DIDN'T LAST AND YOU DIDN'T HAVE MONEY TO GET MORE.: NEVER TRUE

## 2021-02-12 SDOH — ECONOMIC STABILITY: INCOME INSECURITY: HOW HARD IS IT FOR YOU TO PAY FOR THE VERY BASICS LIKE FOOD, HOUSING, MEDICAL CARE, AND HEATING?: NOT HARD AT ALL

## 2021-02-12 ASSESSMENT — PATIENT HEALTH QUESTIONNAIRE - PHQ9
SUM OF ALL RESPONSES TO PHQ9 QUESTIONS 1 & 2: 0
SUM OF ALL RESPONSES TO PHQ QUESTIONS 1-9: 0

## 2021-02-12 NOTE — PROGRESS NOTES
Shon Yanez (: 1980) is a 41 y.o. male, Established patient, here for evaluation of the following chief complaint(s): Other (Pt has a knot on the rt side of his neck, and a bump on his rt arm. Says the neck bump he found pprox 2wks ago and the arm bump last night)        ASSESSMENT/PLAN:  1. Lymphadenopathy  - CBC With Auto Differential; Future  - US HEAD NECK SOFT TISSUE THYROID; Future    2. Lipoma of right upper extremity  - benign cyst   - can refer to general surgery if needed, de declines today     3. Gastroesophageal reflux disease without esophagitis  - pantoprazole (PROTONIX) 20 MG tablet; Take 1 tablet by mouth every morning (before breakfast)  Dispense: 90 tablet; Refill: 3        Return for results. SUBJECTIVE/OBJECTIVE:  HPI  Mass on the right side of his neck, noticed it about 2 weeks ago  States a small knot under his chin on the right side  It is not painful, no skin changes  Patient is concerned due to his childhood history of lymphoma  Denies any upper respiratory symptoms    Also has a small mass in his right deltoid area  Noticed it yesterday   No pain or skin changes    GERD  Stable on Protonix      Review of Systems   Constitutional: Negative. HENT: Negative. Respiratory: Negative. Cardiovascular: Negative. Gastrointestinal: Negative. Physical Exam  Vitals signs reviewed. Neck:      Musculoskeletal: Normal range of motion and neck supple. Lymphadenopathy:      Cervical: Cervical adenopathy (  Right tonsillar) present. Right cervical: No superficial, deep or posterior cervical adenopathy. Left cervical: No superficial, deep or posterior cervical adenopathy. Skin:     Comments: Subcutaneous cyst right deltoid, consistent with a lipoma   Neurological:      Mental Status: He is alert.          Vitals:    21 1558   BP: 128/70   Site: Left Upper Arm   Position: Sitting   Cuff Size: Large Adult   Pulse: 95   Temp: 98.3 °F (36.8 °C)   TempSrc: Temporal   SpO2: 96%   Weight: 253 lb (114.8 kg)   Height: 6' 2\" (1.88 m)                 An electronic signature was used to authenticate this note.     --JAZMÍN Lopez

## 2021-02-13 ASSESSMENT — ENCOUNTER SYMPTOMS
RESPIRATORY NEGATIVE: 1
GASTROINTESTINAL NEGATIVE: 1

## 2021-02-20 ENCOUNTER — HOSPITAL ENCOUNTER (OUTPATIENT)
Dept: ULTRASOUND IMAGING | Age: 41
Discharge: HOME | End: 2021-02-22
Payer: COMMERCIAL

## 2021-02-20 DIAGNOSIS — R59.1 LYMPHADENOPATHY: ICD-10-CM

## 2021-02-20 PROCEDURE — 76536 US EXAM OF HEAD AND NECK: CPT

## 2021-02-23 NOTE — RESULT ENCOUNTER NOTE
1st attempt to reach patient: Called patient @[phone] left  for patient to return call [phone] during normal business hours of 8:00 AM and 5 PM for information on last visit.

## 2021-02-24 ENCOUNTER — TELEPHONE (OUTPATIENT)
Dept: INTERNAL MEDICINE | Age: 41
End: 2021-02-24

## 2021-02-24 DIAGNOSIS — Z85.79 H/O LYMPHOMA: ICD-10-CM

## 2021-02-24 DIAGNOSIS — R59.1 LYMPHADENOPATHY: Primary | ICD-10-CM

## 2021-03-02 ENCOUNTER — INITIAL CONSULT (OUTPATIENT)
Dept: INTERVENTIONAL RADIOLOGY/VASCULAR | Age: 41
End: 2021-03-02
Payer: COMMERCIAL

## 2021-03-02 VITALS
TEMPERATURE: 97.8 F | HEART RATE: 95 BPM | DIASTOLIC BLOOD PRESSURE: 70 MMHG | BODY MASS INDEX: 32.48 KG/M2 | WEIGHT: 253 LBS | SYSTOLIC BLOOD PRESSURE: 128 MMHG

## 2021-03-02 DIAGNOSIS — Z85.9 PERSONAL HISTORY OF MALIGNANT NEOPLASM: ICD-10-CM

## 2021-03-02 DIAGNOSIS — R59.0 CERVICAL LYMPHADENOPATHY: Primary | ICD-10-CM

## 2021-03-02 DIAGNOSIS — C81.02 NODULAR LYMPHOCYTE PREDOMINANT HODGKIN LYMPHOMA OF INTRATHORACIC LYMPH NODES (HCC): ICD-10-CM

## 2021-03-02 DIAGNOSIS — R59.1 LYMPHADENOPATHY: ICD-10-CM

## 2021-03-02 PROCEDURE — 99244 OFF/OP CNSLTJ NEW/EST MOD 40: CPT | Performed by: NURSE PRACTITIONER

## 2021-03-02 ASSESSMENT — ENCOUNTER SYMPTOMS
VOMITING: 0
ABDOMINAL PAIN: 0
COUGH: 0
SHORTNESS OF BREATH: 0
GASTROINTESTINAL NEGATIVE: 1
TROUBLE SWALLOWING: 0
EYES NEGATIVE: 1
NAUSEA: 0
WHEEZING: 0
ABDOMINAL DISTENTION: 0
RESPIRATORY NEGATIVE: 1
DIARRHEA: 0
SORE THROAT: 0
CONSTIPATION: 0

## 2021-03-02 NOTE — PROGRESS NOTES
Niall Hernandez, a male of 41 y.o. came to the office 3/2/2021. No chief complaint on file. HPI: Niall Hernandez is a pleasant male who presents to clinic for consultation at the request of PCP for evaluation of cervical lymphadenopathy with US Guided needle biopsy. H/o Hodgkins Lymphoma as child. States he feels small lump on right side of neck about month ago. He was concerned because of his past H/O Hodgkins Lymphoma. Patient must press hard to be able to feel lump. Today he states lump on smaller and he is unable to palpate it himself. US reports two enlarged right neck lymph nodes. He states he is feeling \"great\" and no complaints.      Family History   Problem Relation Age of Onset    Depression Father         suicide    Depression Sister         suicide    Stroke Maternal Grandfather     Cancer Maternal Grandmother         lung    Heart Attack Maternal Grandmother        Past Surgical History:   Procedure Laterality Date    HIP SURGERY Right 2/4/2020    RIGHT HIP  INCISION AND DRAINAGE performed by Gabrielle Rodríguez MD at [address] HIP ARTHROPLASTY Right 5/31/2018    RIGHT HIP TOTAL HIP ARTHROPLASTY LITTLE performed by Delano Timmons MD at [address] Right 2/6/2020    RIGHT HIP TOTAL ARTHROPLASTY removal of implant  and placement of antibiotic spacer, SUMIT CUP OSTEOTOME, LITTLE ARTICULATED SPACER, SYNTHES CABLES, ROOM 272 performed by Delano Timmons MD at [address] [place] [address]          Past Medical History:   Diagnosis Date    Cancer ([facility] [address] 1993    Hodgkins Lymphoma       Social History     Socioeconomic History    Marital status: Single     Spouse name: Not on file    Number of children: 2    Years of education: Not on file    Highest education level: Not on file   Occupational History    Occupation: maintenance    Social Needs    Financial resource strain: Not hard at all  Food insecurity     Worry: Never true     Inability: Never true    Transportation needs     Medical: No     Non-medical: No   Tobacco Use    Smoking status: Never Smoker    Smokeless tobacco: Former User     Types: Chew   Substance and Sexual Activity    Alcohol use: Yes     Comment: 1 case of beer throughout the week     Drug use: No    Sexual activity: Not Currently     Partners: Female   Lifestyle    Physical activity     Days per week: Not on file     Minutes per session: Not on file    Stress: Not on file   Relationships    Social connections     Talks on phone: Not on file     Gets together: Not on file     Attends Christianity service: Not on file     Active member of club or organization: Not on file     Attends meetings of clubs or organizations: Not on file     Relationship status: Not on file    Intimate partner violence     Fear of current or ex partner: Not on file     Emotionally abused: Not on file     Physically abused: Not on file     Forced sexual activity: Not on file   Other Topics Concern    Not on file   Social History Narrative    Not on file       Allergies   Allergen Reactions    Tramadol Rash     Rash on legs       Current Outpatient Medications on File Prior to Visit   Medication Sig Dispense Refill    pantoprazole (PROTONIX) 20 MG tablet Take 1 tablet by mouth every morning (before breakfast) 90 tablet 3     No current facility-administered medications on file prior to visit. Review of Systems   Constitutional: Negative. Negative for chills, fatigue and fever. HENT: Negative for sore throat and trouble swallowing. Small lump on right side of neck. Eyes: Negative. Respiratory: Negative. Negative for cough, shortness of breath and wheezing. Cardiovascular: Negative. Negative for chest pain, palpitations and leg swelling. Gastrointestinal: Negative. Negative for abdominal distention, abdominal pain, constipation, diarrhea, nausea and vomiting. Endocrine: Negative. Negative for polyuria. Genitourinary: Negative. Negative for dysuria, frequency, hematuria and urgency. Musculoskeletal: Negative. Skin: Negative. Neurological: Negative. Negative for dizziness, light-headedness and headaches. Hematological: Negative. Psychiatric/Behavioral: Negative. OBJECTIVE:  /70   Pulse 95   Temp 97.8 °F (36.6 °C)   Wt 253 lb (114.8 kg)   BMI 32.48 kg/m²     Physical Exam  Vitals signs reviewed. Constitutional:       General: He is not in acute distress. Appearance: Normal appearance. He is not ill-appearing. HENT:      Head: Normocephalic and atraumatic. Nose: Nose normal. No congestion. Neck:      Musculoskeletal: Normal range of motion. Cardiovascular:      Rate and Rhythm: Normal rate. Heart sounds: Normal heart sounds. Pulmonary:      Effort: Pulmonary effort is normal.   Abdominal:      Palpations: Abdomen is soft. Musculoskeletal: Normal range of motion. Skin:     General: Skin is warm and dry. Capillary Refill: Capillary refill takes 2 to 3 seconds. Neurological:      Mental Status: He is alert and oriented to person, place, and time. Psychiatric:         Mood and Affect: Mood normal.         Behavior: Behavior normal.         Us Head Neck Soft Tissue Thyroid  Narrative   EXAMINATION: US HEAD NECK SOFT TISSUE THYROID       CLINICAL HISTORY: LYMPHADENOPATHY. HISTORY OF LYMPHOMA 30 YEARS AGO. PATIENT FEELS LUMP RIGHT LATERAL NECK INFERIOR TO MANDIBLE. NO PAIN.       COMPARISONS: None available.       FINDINGS: Multiple lymph nodes are identified, with the largest lymph nodes measuring 2.2 x 1.3 x 0.8 cm, and 2.0 x 1.2 x 0.7 cm. No abnormal fluid collections identified.           Result Date: 2/21/2021  MULTIPLE LYMPH NODES, RIGHT NECK, AREA OF CLINICAL CONCERN. ASSESSMENT AND PLAN:    Chart, medications, lab work reviewed. Assessment:   1. Cervical Lymphadenopathy.  US shows two enlarged

## 2021-03-10 ENCOUNTER — PROCEDURE VISIT (OUTPATIENT)
Dept: INTERVENTIONAL RADIOLOGY/VASCULAR | Age: 41
End: 2021-03-10

## 2021-03-10 VITALS
SYSTOLIC BLOOD PRESSURE: 128 MMHG | WEIGHT: 253 LBS | HEART RATE: 95 BPM | TEMPERATURE: 97.8 F | BODY MASS INDEX: 32.48 KG/M2 | DIASTOLIC BLOOD PRESSURE: 70 MMHG

## 2021-03-10 DIAGNOSIS — C81.02 NODULAR LYMPHOCYTE PREDOMINANT HODGKIN LYMPHOMA OF INTRATHORACIC LYMPH NODES (HCC): Primary | ICD-10-CM

## 2021-03-10 DIAGNOSIS — Z85.9 PERSONAL HISTORY OF MALIGNANT NEOPLASM: ICD-10-CM

## 2021-03-10 DIAGNOSIS — R59.0 CERVICAL LYMPHADENOPATHY: ICD-10-CM

## 2021-03-11 NOTE — PROGRESS NOTES
IMPRESSION:   Successful ultrasound-guided fine-needle aspiration of right submandibular enlarged lymph node. A core biopsy was not possible due to the small size of the lymph node in the proximity to the blood vessels posterior to the lymph node. CLINICAL HISTORY: Beulah Banks is a Male of 41 years age, referred for ultrasound-guided core biopsy of enlarged lymph node in the right submandibular area and right neck noted on recent sonography. PROCEDURE: Survey of the neck showed 2 enlarged lymph nodes in the right submandibular region. After obtaining informed consent, the patient was positioned supine on the sonography table. A transverse approach was used. The skin over the neck was cleansed with ChloraPrep. Cutaneous and deeper subcutaneous anesthesia was achieved using 1% Lidocaine. A 22-gauge coaxial biopsy system was inserted followed by the needle and its accurate position confirmed. A total of 2 fine-needle aspiration passes were performed and specimen sent in RPMI solution. Hemostasis was achieved by direct digital compression. Post procedure images did not demonstrate excessive hemorrhage at the target site. The patient tolerated the procedure well. Following the procedure, the wound was cleansed and compressed. Band-aid was applied and the patient was given post biopsy instructions. The patient left the department in good condition. A radiology nurse was in presence monitoring vital signs, assisting throughout the procedure.

## 2021-03-12 ENCOUNTER — TELEPHONE (OUTPATIENT)
Dept: INTERNAL MEDICINE | Age: 41
End: 2021-03-12

## 2021-03-12 LAB
INTERPRETATION: NORMAL
NUMBER OF MARKERS: 22 MARKERS
PROF LEUK/LYMPH PHENO 16 OR MORE MARKERS: NORMAL
SOURCE: NORMAL
TECHNICAL LEUK/LYMPH PHENO, 22 MARKERS: NORMAL

## 2021-03-12 NOTE — TELEPHONE ENCOUNTER
Pt had a Biopsy done yesterday and the results were sent to Amando, he called wanting to know what everything meant. I told him I would put a telephone encounter into his provider. He then said that Vivi Rivera is the one who ordered it and asked that I sent it to her.    Please review

## 2021-03-17 ENCOUNTER — OFFICE VISIT (OUTPATIENT)
Dept: INTERNAL MEDICINE | Age: 41
End: 2021-03-17
Payer: COMMERCIAL

## 2021-03-17 VITALS
HEIGHT: 74 IN | DIASTOLIC BLOOD PRESSURE: 80 MMHG | WEIGHT: 253 LBS | BODY MASS INDEX: 32.47 KG/M2 | HEART RATE: 84 BPM | TEMPERATURE: 98.3 F | SYSTOLIC BLOOD PRESSURE: 130 MMHG | OXYGEN SATURATION: 98 %

## 2021-03-17 DIAGNOSIS — Z71.2 ENCOUNTER TO DISCUSS TEST RESULTS: Primary | ICD-10-CM

## 2021-03-17 DIAGNOSIS — Z85.79 H/O LYMPHOMA: ICD-10-CM

## 2021-03-17 DIAGNOSIS — R59.1 LYMPHADENOPATHY: ICD-10-CM

## 2021-03-17 PROCEDURE — 99213 OFFICE O/P EST LOW 20 MIN: CPT | Performed by: PHYSICIAN ASSISTANT

## 2021-03-17 NOTE — PROGRESS NOTES
Paulette Grigsby (: 1980) is a 41 y.o. male, Established patient, here for evaluation of the following chief complaint(s):  Follow Up After Procedure (biopsy of mass )        ASSESSMENT/PLAN:  1. Encounter to discuss test results  2. Lymphadenopathy  3. H/O lymphoma  - biopsy day snot show any  evidence of malignancy , but I advised that she f/u with hematology/oncology due to his history , and the complexity of the results       No follow-ups on file. SUBJECTIVE/OBJECTIVE:  HPI    Follow-up after lymph node biopsy done by Dr. Foster Rendon  Patient had a large lymph node on the right side of his neck  He has a history of Hodgkin's lymphoma greater than 15 years ago  States the node is smaller, no pain , healed after the biopsy     IMPRESSION:   There is no flow cytometric evidence of monoclonality, plasma cell   dyscrasia,   or non-Hodgkin lymphoma. See comment. COMMENT:   Please correlate the results from this study morphologically and clinically   for proper interpretation, and keep in mind that flow cytometric   immunophenotyping is an ancillary study that is not intended to be used   to completely exclude the possibility of a hematopoietic neoplasm. A   previous flow cytometry study from 2017 reported no flow cytometric evidence of   monoclonality, acute leukemia, or lymphoproliferative disorder    Review of Systems   Constitutional: Negative. HENT: Negative. Respiratory: Negative. Cardiovascular: Negative. Gastrointestinal: Negative. Physical Exam  Vitals signs reviewed. Constitutional:       Appearance: Normal appearance. HENT:      Head: Normocephalic and atraumatic. Cardiovascular:      Rate and Rhythm: Normal rate. Lymphadenopathy:      Head:      Right side of head: Tonsillar adenopathy present. Neurological:      Mental Status: He is alert.          Vitals:    21 1616   BP: 130/80   Site: Left Upper Arm   Position: Sitting   Cuff Size: Medium Adult   Pulse: 84 Temp: 98.3 °F (36.8 °C)   TempSrc: Infrared   SpO2: 98%   Weight: 253 lb (114.8 kg)   Height: 6' 2\" (1.88 m)                 An electronic signature was used to authenticate this note.     --JAZMÍN Collins

## 2021-03-19 ASSESSMENT — ENCOUNTER SYMPTOMS
RESPIRATORY NEGATIVE: 1
GASTROINTESTINAL NEGATIVE: 1

## 2021-03-22 ENCOUNTER — TELEPHONE (OUTPATIENT)
Dept: INTERNAL MEDICINE | Age: 41
End: 2021-03-22

## 2021-03-22 DIAGNOSIS — R59.1 LYMPHADENOPATHY: Primary | ICD-10-CM

## 2021-03-22 DIAGNOSIS — Z85.79 H/O LYMPHOMA: ICD-10-CM

## 2021-03-22 NOTE — TELEPHONE ENCOUNTER
After speaking with Dr Paris Anton, he agrees that a hemologist /oncologist should look at the results to make sure no more testing needs to be done  .  I can not complete interpret this as a negative for cancer test.

## 2021-04-17 ENCOUNTER — HOSPITAL ENCOUNTER (OUTPATIENT)
Dept: CT IMAGING | Age: 41
Discharge: HOME | End: 2021-04-19
Payer: COMMERCIAL

## 2021-04-17 DIAGNOSIS — R59.0 VIRCHOW'S NODE: ICD-10-CM

## 2021-04-17 DIAGNOSIS — Z85.71 HISTORY OF HODGKIN'S DISEASE: ICD-10-CM

## 2021-04-17 PROCEDURE — 6360000004 HC RX CONTRAST MEDICATION: Performed by: INTERNAL MEDICINE

## 2021-04-17 PROCEDURE — 70491 CT SOFT TISSUE NECK W/DYE: CPT

## 2021-04-17 RX ADMIN — IOPAMIDOL 100 ML: 612 INJECTION, SOLUTION INTRAVENOUS at 09:09

## 2021-05-15 ENCOUNTER — HOSPITAL ENCOUNTER (OUTPATIENT)
Dept: ULTRASOUND IMAGING | Age: 41
Discharge: HOME | End: 2021-05-17
Payer: COMMERCIAL

## 2021-05-15 DIAGNOSIS — E07.89 OTHER SPECIFIED DISORDERS OF THYROID: ICD-10-CM

## 2021-05-15 PROCEDURE — 76536 US EXAM OF HEAD AND NECK: CPT

## 2021-07-15 ENCOUNTER — HOSPITAL ENCOUNTER (OUTPATIENT)
Dept: LAB | Age: 41
Discharge: HOME | End: 2021-07-15
Payer: COMMERCIAL

## 2021-07-15 LAB — TSH SERPL DL<=0.05 MIU/L-ACNC: 5.72 UIU/ML (ref 0.44–3.86)

## 2021-07-15 PROCEDURE — 36415 COLL VENOUS BLD VENIPUNCTURE: CPT

## 2021-07-15 PROCEDURE — 84443 ASSAY THYROID STIM HORMONE: CPT

## 2021-08-26 ENCOUNTER — HOSPITAL ENCOUNTER (OUTPATIENT)
Dept: LAB | Age: 41
Discharge: HOME | End: 2021-08-26
Payer: COMMERCIAL

## 2021-08-26 LAB — TSH SERPL DL<=0.05 MIU/L-ACNC: 5.67 UIU/ML (ref 0.44–3.86)

## 2021-08-26 PROCEDURE — 36415 COLL VENOUS BLD VENIPUNCTURE: CPT

## 2021-08-26 PROCEDURE — 84443 ASSAY THYROID STIM HORMONE: CPT

## 2021-09-29 ENCOUNTER — HOSPITAL ENCOUNTER (OUTPATIENT)
Dept: LAB | Age: 41
Discharge: HOME | End: 2021-09-29
Payer: COMMERCIAL

## 2021-09-29 LAB — TSH SERPL DL<=0.05 MIU/L-ACNC: 4.48 UIU/ML (ref 0.44–3.86)

## 2021-09-29 PROCEDURE — 84443 ASSAY THYROID STIM HORMONE: CPT

## 2021-09-29 PROCEDURE — 36415 COLL VENOUS BLD VENIPUNCTURE: CPT

## 2021-11-12 ENCOUNTER — HOSPITAL ENCOUNTER (OUTPATIENT)
Dept: LAB | Age: 41
Discharge: HOME | End: 2021-11-12
Payer: COMMERCIAL

## 2021-11-12 LAB — TSH SERPL DL<=0.05 MIU/L-ACNC: 3.45 UIU/ML (ref 0.44–3.86)

## 2021-11-12 PROCEDURE — 84443 ASSAY THYROID STIM HORMONE: CPT

## 2021-11-12 PROCEDURE — 36415 COLL VENOUS BLD VENIPUNCTURE: CPT

## 2022-06-03 ENCOUNTER — OFFICE VISIT (OUTPATIENT)
Dept: INTERNAL MEDICINE | Age: 42
End: 2022-06-03
Payer: COMMERCIAL

## 2022-06-03 VITALS
BODY MASS INDEX: 32.21 KG/M2 | OXYGEN SATURATION: 98 % | HEIGHT: 74 IN | TEMPERATURE: 98.8 F | WEIGHT: 251 LBS | SYSTOLIC BLOOD PRESSURE: 132 MMHG | HEART RATE: 77 BPM | DIASTOLIC BLOOD PRESSURE: 84 MMHG

## 2022-06-03 DIAGNOSIS — K21.9 GASTROESOPHAGEAL REFLUX DISEASE WITHOUT ESOPHAGITIS: ICD-10-CM

## 2022-06-03 DIAGNOSIS — E03.9 ACQUIRED HYPOTHYROIDISM: ICD-10-CM

## 2022-06-03 PROCEDURE — 99213 OFFICE O/P EST LOW 20 MIN: CPT | Performed by: PHYSICIAN ASSISTANT

## 2022-06-03 RX ORDER — LEVOTHYROXINE SODIUM 0.07 MG/1
75 TABLET ORAL DAILY
COMMUNITY

## 2022-06-03 RX ORDER — PANTOPRAZOLE SODIUM 20 MG/1
20 TABLET, DELAYED RELEASE ORAL
Qty: 90 TABLET | Refills: 3 | Status: SHIPPED | OUTPATIENT
Start: 2022-06-03

## 2022-06-03 SDOH — ECONOMIC STABILITY: FOOD INSECURITY: WITHIN THE PAST 12 MONTHS, YOU WORRIED THAT YOUR FOOD WOULD RUN OUT BEFORE YOU GOT MONEY TO BUY MORE.: NEVER TRUE

## 2022-06-03 SDOH — ECONOMIC STABILITY: FOOD INSECURITY: WITHIN THE PAST 12 MONTHS, THE FOOD YOU BOUGHT JUST DIDN'T LAST AND YOU DIDN'T HAVE MONEY TO GET MORE.: NEVER TRUE

## 2022-06-03 ASSESSMENT — ENCOUNTER SYMPTOMS: RESPIRATORY NEGATIVE: 1

## 2022-06-03 ASSESSMENT — PATIENT HEALTH QUESTIONNAIRE - PHQ9
9. THOUGHTS THAT YOU WOULD BE BETTER OFF DEAD, OR OF HURTING YOURSELF: 0
8. MOVING OR SPEAKING SO SLOWLY THAT OTHER PEOPLE COULD HAVE NOTICED. OR THE OPPOSITE, BEING SO FIGETY OR RESTLESS THAT YOU HAVE BEEN MOVING AROUND A LOT MORE THAN USUAL: 0
SUM OF ALL RESPONSES TO PHQ9 QUESTIONS 1 & 2: 0
7. TROUBLE CONCENTRATING ON THINGS, SUCH AS READING THE NEWSPAPER OR WATCHING TELEVISION: 0
4. FEELING TIRED OR HAVING LITTLE ENERGY: 0
3. TROUBLE FALLING OR STAYING ASLEEP: 0
1. LITTLE INTEREST OR PLEASURE IN DOING THINGS: 0
SUM OF ALL RESPONSES TO PHQ QUESTIONS 1-9: 0
5. POOR APPETITE OR OVEREATING: 0
6. FEELING BAD ABOUT YOURSELF - OR THAT YOU ARE A FAILURE OR HAVE LET YOURSELF OR YOUR FAMILY DOWN: 0
SUM OF ALL RESPONSES TO PHQ QUESTIONS 1-9: 0
10. IF YOU CHECKED OFF ANY PROBLEMS, HOW DIFFICULT HAVE THESE PROBLEMS MADE IT FOR YOU TO DO YOUR WORK, TAKE CARE OF THINGS AT HOME, OR GET ALONG WITH OTHER PEOPLE: 0
2. FEELING DOWN, DEPRESSED OR HOPELESS: 0

## 2022-06-03 ASSESSMENT — SOCIAL DETERMINANTS OF HEALTH (SDOH): HOW HARD IS IT FOR YOU TO PAY FOR THE VERY BASICS LIKE FOOD, HOUSING, MEDICAL CARE, AND HEATING?: NOT HARD AT ALL

## 2022-06-03 NOTE — PROGRESS NOTES
Jesus Tilley (: 1980) is a 35 y.o. male, Established patient, here for evaluation of the following chief complaint(s):  New Patient (refills needed)        ASSESSMENT/PLAN:  1. Gastroesophageal reflux disease without esophagitis  - pantoprazole (PROTONIX) 20 MG tablet; Take 1 tablet by mouth every morning (before breakfast)  Dispense: 90 tablet; Refill: 3  -Stable on current dose , continue      2. Acquired hypothyroidism  - seeing Dr Manda Mattson         No follow-ups on file. SUBJECTIVE/OBJECTIVE:  HPI      GERD  Stable on Protonix     Hypothyroid  Stable on current ode of Synthroid   Sees Dr Manda Mattson for that       Review of Systems   Constitutional: Negative. HENT: Negative. Respiratory: Negative. Cardiovascular: Negative. Endocrine: Negative. Neurological: Negative. Physical Exam  Vitals reviewed. Constitutional:       Appearance: Normal appearance. HENT:      Head: Normocephalic. Cardiovascular:      Rate and Rhythm: Normal rate and regular rhythm. Pulmonary:      Effort: Pulmonary effort is normal.      Breath sounds: Normal breath sounds. Neurological:      General: No focal deficit present. Mental Status: He is alert and oriented to person, place, and time. Vitals:    22 1434   BP: 132/84   Site: Left Upper Arm   Position: Sitting   Cuff Size: Large Adult   Pulse: 77   Temp: 98.8 °F (37.1 °C)   TempSrc: Oral   SpO2: 98%   Weight: 251 lb (113.9 kg)   Height: 6' 2\" (1.88 m)                 An electronic signature was used to authenticate this note.     --JAZMÍN Galindo

## 2022-07-01 ENCOUNTER — HOSPITAL ENCOUNTER (EMERGENCY)
Age: 42
Discharge: HOME | End: 2022-07-01
Attending: EMERGENCY MEDICINE
Payer: OTHER MISCELLANEOUS

## 2022-07-01 ENCOUNTER — APPOINTMENT (OUTPATIENT)
Dept: GENERAL RADIOLOGY | Age: 42
End: 2022-07-01
Payer: OTHER MISCELLANEOUS

## 2022-07-01 VITALS
HEART RATE: 88 BPM | TEMPERATURE: 97.9 F | HEIGHT: 74 IN | BODY MASS INDEX: 31.57 KG/M2 | DIASTOLIC BLOOD PRESSURE: 90 MMHG | OXYGEN SATURATION: 98 % | WEIGHT: 246 LBS | RESPIRATION RATE: 20 BRPM | SYSTOLIC BLOOD PRESSURE: 129 MMHG

## 2022-07-01 DIAGNOSIS — M62.838 SPASM OF MUSCLE: ICD-10-CM
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
## 2022-07-01 DIAGNOSIS — S39.012A STRAIN OF LUMBAR REGION, INITIAL ENCOUNTER: Primary | ICD-10-CM

## 2022-07-01 PROCEDURE — 99283 EMERGENCY DEPT VISIT LOW MDM: CPT

## 2022-07-01 PROCEDURE — 72110 X-RAY EXAM L-2 SPINE 4/>VWS: CPT

## 2022-07-01 RX ORDER — NAPROXEN 500 MG/1
500 TABLET ORAL 2 TIMES DAILY
Qty: 20 TABLET | Refills: 0 | Status: SHIPPED | OUTPATIENT
Start: 2022-07-01 | End: 2022-07-11

## 2022-07-01 RX ORDER — CYCLOBENZAPRINE HCL 10 MG
10 TABLET ORAL 3 TIMES DAILY PRN
Qty: 21 TABLET | Refills: 0 | Status: SHIPPED | OUTPATIENT
Start: 2022-07-01 | End: 2022-07-11

## 2022-07-01 ASSESSMENT — ENCOUNTER SYMPTOMS
BACK PAIN: 1
WHEEZING: 0
COUGH: 0
ABDOMINAL PAIN: 0
EYE DISCHARGE: 0
CHEST TIGHTNESS: 0
STRIDOR: 0
DIARRHEA: 0
CHOKING: 0
TROUBLE SWALLOWING: 0
EYE PAIN: 0
SHORTNESS OF BREATH: 0
VOICE CHANGE: 0
VOMITING: 0
SORE THROAT: 0
BLOOD IN STOOL: 0
EYE REDNESS: 0
FACIAL SWELLING: 0
CONSTIPATION: 0
SINUS PRESSURE: 0

## 2022-07-01 ASSESSMENT — PAIN DESCRIPTION - ORIENTATION: ORIENTATION: MID;LOWER

## 2022-07-01 ASSESSMENT — PAIN - FUNCTIONAL ASSESSMENT: PAIN_FUNCTIONAL_ASSESSMENT: 0-10

## 2022-07-01 ASSESSMENT — PAIN DESCRIPTION - LOCATION: LOCATION: BACK

## 2022-07-01 ASSESSMENT — PAIN DESCRIPTION - DESCRIPTORS: DESCRIPTORS: TIGHTNESS

## 2022-07-01 ASSESSMENT — PAIN SCALES - GENERAL: PAINLEVEL_OUTOF10: 6
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
## 2022-08-23 ENCOUNTER — HOSPITAL ENCOUNTER (OUTPATIENT)
Dept: LAB | Age: 42
Discharge: HOME | End: 2022-08-23
Payer: COMMERCIAL

## 2022-08-23 LAB — TSH SERPL DL<=0.05 MIU/L-ACNC: 4.04 UIU/ML (ref 0.44–3.86)

## 2022-08-23 PROCEDURE — 36415 COLL VENOUS BLD VENIPUNCTURE: CPT

## 2022-08-23 PROCEDURE — 84443 ASSAY THYROID STIM HORMONE: CPT

## 2022-09-29 ENCOUNTER — HOSPITAL ENCOUNTER (OUTPATIENT)
Dept: LAB | Age: 42
Discharge: HOME | End: 2022-09-29
Payer: COMMERCIAL

## 2022-09-29 LAB — TSH SERPL DL<=0.05 MIU/L-ACNC: 2.17 UIU/ML (ref 0.44–3.86)

## 2022-09-29 PROCEDURE — 84443 ASSAY THYROID STIM HORMONE: CPT

## 2022-09-29 PROCEDURE — 36415 COLL VENOUS BLD VENIPUNCTURE: CPT

## 2023-06-23 DIAGNOSIS — K21.9 GASTROESOPHAGEAL REFLUX DISEASE WITHOUT ESOPHAGITIS: ICD-10-CM

## 2023-06-23 NOTE — TELEPHONE ENCOUNTER
Comments:     Last Office Visit (last PCP visit):   6/3/2022    Next Visit Date:  Future Appointments   Date Time Provider [address]   7/11/2023  3:15 PM [address], [address] [facility]       **If hasn't been seen in over a year OR hasn't followed up according to last diabetes/ADHD visit, make appointment for patient before sending refill to provider.     Rx requested:  Requested Prescriptions     Pending Prescriptions Disp Refills    pantoprazole (PROTONIX) 20 MG tablet [Pharmacy Med Name: pantoprazole 20 mg tablet,delayed release] 90 tablet 3     Sig: TAKE 1 TABLET BY MOUTH EVERY MORNING BEFORE BREAKFAST

## 2023-06-26 RX ORDER — PANTOPRAZOLE SODIUM 20 MG/1
TABLET, DELAYED RELEASE ORAL
Qty: 30 TABLET | Refills: 0 | Status: SHIPPED | OUTPATIENT
Start: 2023-06-26 | End: 2023-07-11 | Stop reason: SDUPTHER

## 2023-07-11 ENCOUNTER — OFFICE VISIT (OUTPATIENT)
Dept: INTERNAL MEDICINE | Age: 43
End: 2023-07-11
Payer: COMMERCIAL

## 2023-07-11 VITALS
OXYGEN SATURATION: 96 % | SYSTOLIC BLOOD PRESSURE: 124 MMHG | TEMPERATURE: 97.8 F | DIASTOLIC BLOOD PRESSURE: 82 MMHG | BODY MASS INDEX: 31.32 KG/M2 | WEIGHT: 244 LBS | RESPIRATION RATE: 16 BRPM | HEIGHT: 74 IN | HEART RATE: 75 BPM

## 2023-07-11 DIAGNOSIS — Z13.1 SCREENING FOR DIABETES MELLITUS: ICD-10-CM

## 2023-07-11 DIAGNOSIS — Z00.00 ENCOUNTER FOR WELL ADULT EXAM WITHOUT ABNORMAL FINDINGS: Primary | ICD-10-CM

## 2023-07-11 DIAGNOSIS — E03.9 ACQUIRED HYPOTHYROIDISM: ICD-10-CM

## 2023-07-11 DIAGNOSIS — Z13.220 NEED FOR LIPID SCREENING: ICD-10-CM

## 2023-07-11 DIAGNOSIS — K21.9 GASTROESOPHAGEAL REFLUX DISEASE WITHOUT ESOPHAGITIS: ICD-10-CM

## 2023-07-11 DIAGNOSIS — D17.1 LIPOMA OF BACK: ICD-10-CM

## 2023-07-11 PROCEDURE — 99396 PREV VISIT EST AGE 40-64: CPT | Performed by: PHYSICIAN ASSISTANT

## 2023-07-11 RX ORDER — PANTOPRAZOLE SODIUM 20 MG/1
20 TABLET, DELAYED RELEASE ORAL
Qty: 90 TABLET | Refills: 3 | Status: SHIPPED | OUTPATIENT
Start: 2023-07-11

## 2023-07-11 RX ORDER — PANTOPRAZOLE SODIUM 20 MG/1
20 TABLET, DELAYED RELEASE ORAL
Qty: 90 TABLET | Refills: 1 | Status: SHIPPED | OUTPATIENT
Start: 2023-07-11 | End: 2023-07-11 | Stop reason: SDUPTHER

## 2023-07-11 RX ORDER — LEVOTHYROXINE SODIUM 0.1 MG/1
100 TABLET ORAL DAILY
COMMUNITY
Start: 2023-06-05

## 2023-07-11 RX ORDER — MELOXICAM 15 MG/1
TABLET ORAL
COMMUNITY
Start: 2023-06-15

## 2023-07-11 SDOH — ECONOMIC STABILITY: INCOME INSECURITY: HOW HARD IS IT FOR YOU TO PAY FOR THE VERY BASICS LIKE FOOD, HOUSING, MEDICAL CARE, AND HEATING?: NOT HARD AT ALL

## 2023-07-11 SDOH — ECONOMIC STABILITY: HOUSING INSECURITY
IN THE LAST 12 MONTHS, WAS THERE A TIME WHEN YOU DID NOT HAVE A STEADY PLACE TO SLEEP OR SLEPT IN A SHELTER (INCLUDING NOW)?: NO

## 2023-07-11 SDOH — ECONOMIC STABILITY: FOOD INSECURITY: WITHIN THE PAST 12 MONTHS, YOU WORRIED THAT YOUR FOOD WOULD RUN OUT BEFORE YOU GOT MONEY TO BUY MORE.: NEVER TRUE

## 2023-07-11 SDOH — ECONOMIC STABILITY: FOOD INSECURITY: WITHIN THE PAST 12 MONTHS, THE FOOD YOU BOUGHT JUST DIDN'T LAST AND YOU DIDN'T HAVE MONEY TO GET MORE.: NEVER TRUE

## 2023-07-11 ASSESSMENT — PATIENT HEALTH QUESTIONNAIRE - PHQ9
2. FEELING DOWN, DEPRESSED OR HOPELESS: 0
SUM OF ALL RESPONSES TO PHQ QUESTIONS 1-9: 0
SUM OF ALL RESPONSES TO PHQ9 QUESTIONS 1 & 2: 0
SUM OF ALL RESPONSES TO PHQ QUESTIONS 1-9: 0
1. LITTLE INTEREST OR PLEASURE IN DOING THINGS: 0

## 2023-07-11 NOTE — PROGRESS NOTES
Well Adult Note  Name: Gigi Monroy Date: 2023   MRN: [id number] Sex: Male   Age: 38 y.o. Ethnicity: Non- / Non    : 1980 Race: White (non-)      Bertha Arteaga is here for well adult exam.  History:  New concerns - none   Smoker -  none   Alcohol consumption-  yes, beer a few a day and more on the weekends    Regular exercise -  No      Review of Systems   All other systems reviewed and are negative. Allergies   Allergen Reactions    Tramadol Rash     Rash on legs         Prior to Visit Medications    Medication Sig Taking? Authorizing Provider   levothyroxine (SYNTHROID) 100 MCG tablet Take 1 tablet by mouth daily Yes Historical Provider, MD   meloxicam (MOBIC) 15 MG tablet TAKE 1 TABLET DAILY AS NEEDED.  Yes Historical Provider, MD   pantoprazole (PROTONIX) 20 MG tablet Take 1 tablet by mouth every morning (before breakfast) Yes JAZMÍN Ashton   naproxen (NAPROSYN) 500 MG tablet Take 1 tablet by mouth 2 times daily for 20 doses  All Alanis MD         Past Medical History:   Diagnosis Date    Cancer [facility])     Hodgkins Lymphoma       Past Surgical History:   Procedure Laterality Date    HIP SURGERY Right 2020    RIGHT HIP  INCISION AND DRAINAGE performed by Shruthi Early MD at [address] [facility] ACETBLR/PROX FEM PROSTC AGRFT/ALGRFT Right 2018    RIGHT HIP TOTAL HIP ARTHROPLASTY LITTLE performed by Lilia Vieyra MD at [address] [place] [address] Right 2020    RIGHT HIP TOTAL ARTHROPLASTY removal of implant  and placement of antibiotic spacer, SUMIT CUP OSTEOTOME, LITTLE ARTICULATED SPACER, SYNTHES CABLES, ROOM 272 performed by Lilia Vieyra MD at [facility]  3/10/2021     LYMPH NODE BIOPSY 3/10/2021 Pineda Balderas MD [facility] RAD VASC INTERVNL    VASECTOMY           Family History   Problem Relation Age of Onset    Depression Father

## 2023-07-21 ENCOUNTER — OFFICE VISIT (OUTPATIENT)
Dept: FAMILY MEDICINE CLINIC | Age: 43
End: 2023-07-21
Payer: COMMERCIAL

## 2023-07-21 VITALS
DIASTOLIC BLOOD PRESSURE: 86 MMHG | RESPIRATION RATE: 14 BRPM | OXYGEN SATURATION: 100 % | HEART RATE: 99 BPM | WEIGHT: 232.6 LBS | TEMPERATURE: 98.8 F | BODY MASS INDEX: 29.86 KG/M2 | SYSTOLIC BLOOD PRESSURE: 126 MMHG

## 2023-07-21 DIAGNOSIS — G89.29 CHRONIC LEFT HIP PAIN: Primary | ICD-10-CM

## 2023-07-21 DIAGNOSIS — M25.552 CHRONIC LEFT HIP PAIN: Primary | ICD-10-CM

## 2023-07-21 PROBLEM — E04.2 MULTINODULAR GOITER: Status: ACTIVE | Noted: 2023-07-21

## 2023-07-21 PROBLEM — M16.11 UNILATERAL PRIMARY OSTEOARTHRITIS, RIGHT HIP: Status: ACTIVE | Noted: 2018-08-28

## 2023-07-21 PROBLEM — Z96.649 HISTORY OF TOTAL HIP REPLACEMENT: Status: ACTIVE | Noted: 2023-07-21

## 2023-07-21 PROBLEM — M62.830 SPASM OF THORACIC BACK MUSCLE: Status: ACTIVE | Noted: 2023-07-21

## 2023-07-21 PROBLEM — M16.9 OSTEOARTHRITIS OF HIP: Status: ACTIVE | Noted: 2022-09-16

## 2023-07-21 PROBLEM — M25.559 ARTHRALGIA OF HIP: Status: ACTIVE | Noted: 2023-07-21

## 2023-07-21 PROCEDURE — 99213 OFFICE O/P EST LOW 20 MIN: CPT

## 2023-07-21 RX ORDER — METHYLPREDNISOLONE 4 MG/1
TABLET ORAL
Qty: 1 KIT | Refills: 0 | Status: SHIPPED | OUTPATIENT
Start: 2023-07-21 | End: 2023-07-27

## 2023-07-21 ASSESSMENT — ENCOUNTER SYMPTOMS
WHEEZING: 0
SHORTNESS OF BREATH: 0
COLOR CHANGE: 0
COUGH: 0

## 2023-07-21 NOTE — PROGRESS NOTES
expectations have been discussed with the patient who expresses understanding and desires to proceed. Close follow up to evaluate treatment results and for coordination of care. I have reviewed the patient's medical history in detail and updated the computerized patient record.     SHRUTHI Wiggins NP

## 2023-11-01 ENCOUNTER — OFFICE VISIT (OUTPATIENT)
Dept: ORTHOPEDIC SURGERY | Facility: CLINIC | Age: 43
End: 2023-11-01
Payer: COMMERCIAL

## 2023-11-01 DIAGNOSIS — M16.12 PRIMARY OSTEOARTHRITIS OF LEFT HIP: Primary | ICD-10-CM

## 2023-11-01 PROBLEM — Z96.649 STATUS POST TOTAL REPLACEMENT OF HIP: Status: ACTIVE | Noted: 2023-11-01

## 2023-11-01 PROBLEM — M62.830 SPASM OF THORACIC BACK MUSCLE: Status: ACTIVE | Noted: 2023-11-01

## 2023-11-01 PROBLEM — E04.2 MULTIPLE THYROID NODULES: Status: ACTIVE | Noted: 2023-11-01

## 2023-11-01 PROBLEM — E03.9 HYPOTHYROIDISM: Status: ACTIVE | Noted: 2023-11-01

## 2023-11-01 PROBLEM — T84.9XXA COMPLICATIONS DUE TO INTERNAL JOINT PROSTHESIS (CMS-HCC): Status: ACTIVE | Noted: 2023-11-01

## 2023-11-01 PROBLEM — M16.9 OSTEOARTHRITIS OF HIP: Status: ACTIVE | Noted: 2023-11-01

## 2023-11-01 PROCEDURE — 99213 OFFICE O/P EST LOW 20 MIN: CPT | Performed by: ORTHOPAEDIC SURGERY

## 2023-11-01 RX ORDER — CYCLOBENZAPRINE HCL 10 MG
10 TABLET ORAL NIGHTLY PRN
Qty: 15 TABLET | Refills: 0 | Status: SHIPPED | OUTPATIENT
Start: 2023-11-01 | End: 2023-11-16

## 2023-11-01 RX ORDER — BACLOFEN 10 MG/1
TABLET ORAL
COMMUNITY
Start: 2020-02-20

## 2023-11-01 RX ORDER — METAXALONE 800 MG/1
TABLET ORAL
COMMUNITY
Start: 2020-03-10

## 2023-11-01 RX ORDER — FERROUS SULFATE 325(65) MG
TABLET ORAL
COMMUNITY
Start: 2020-03-16

## 2023-11-01 RX ORDER — AMOXICILLIN 250 MG
CAPSULE ORAL
COMMUNITY
Start: 2020-02-20

## 2023-11-01 RX ORDER — LEVOTHYROXINE SODIUM 25 UG/1
1 TABLET ORAL DAILY
COMMUNITY
Start: 2021-07-27

## 2023-11-01 RX ORDER — LEVOTHYROXINE SODIUM 75 UG/1
1 TABLET ORAL DAILY
COMMUNITY
Start: 2021-10-01

## 2023-11-01 RX ORDER — DIAZEPAM 5 MG/1
TABLET ORAL
COMMUNITY
Start: 2020-02-20

## 2023-11-01 RX ORDER — OXYCODONE HYDROCHLORIDE 5 MG/1
TABLET ORAL
COMMUNITY
Start: 2020-02-10

## 2023-11-01 RX ORDER — DOCUSATE SODIUM 100 MG/1
CAPSULE, LIQUID FILLED ORAL
COMMUNITY
Start: 2020-04-07

## 2023-11-01 RX ORDER — ASPIRIN 81 MG/1
TABLET ORAL
COMMUNITY
Start: 2020-02-10

## 2023-11-01 RX ORDER — CYCLOBENZAPRINE HCL 10 MG
TABLET ORAL
COMMUNITY
Start: 2020-04-07 | End: 2023-11-01 | Stop reason: SDUPTHER

## 2023-11-01 RX ORDER — LEVOTHYROXINE SODIUM 100 UG/1
1 TABLET ORAL DAILY
COMMUNITY
Start: 2022-09-02 | End: 2024-01-30 | Stop reason: SDUPTHER

## 2023-11-01 RX ORDER — OXYCODONE AND ACETAMINOPHEN 5; 325 MG/1; MG/1
TABLET ORAL
COMMUNITY
Start: 2020-04-07

## 2023-11-01 RX ORDER — MELOXICAM 15 MG/1
1 TABLET ORAL DAILY PRN
COMMUNITY
Start: 2020-03-10

## 2023-11-01 RX ORDER — L. ACIDOPHILUS/L.BULGARICUS 1MM CELL
TABLET ORAL
COMMUNITY

## 2023-11-01 RX ORDER — PANTOPRAZOLE SODIUM 20 MG/1
20 TABLET, DELAYED RELEASE ORAL DAILY PRN
COMMUNITY

## 2023-11-01 RX ORDER — ACETAMINOPHEN 325 MG/1
650 TABLET ORAL EVERY 6 HOURS PRN
COMMUNITY
Start: 2020-04-07

## 2023-11-01 NOTE — PROGRESS NOTES
Subjective    Patient ID: Jonah    Chief Complaint:   Chief Complaint   Patient presents with    Left Hip - Follow-up     Left hip pain      History of present illness    43-year-old gentleman presents clinic today for follow-up evaluation left hip osteoarthritis.  He states that the meloxicam seems to be helping.  He states that at times he has had flareups which causes him to go on his meloxicam daily.  Usually he just uses this intermittently.   For the most part doing well.  He is having very little groin pain at this time.  He has been able to weight-bear as tolerated.  He was going through physical therapy which seemed to help.  He is here today to update his paperwork in regards to intermittent FMLA whenever he gets these flareups.  At times he has difficulty sleeping due to the amount of pain that he has present.      Past medical , Surgical, Family and social history reviewed.      Physical exam  General: No acute distress and breathing comfortably.  Patient is pleasant and cooperative with the examination.    Extremity  Left distal vascular intact.  Slightly limited range of motion with passive flexion left hip.  Mild groin pain.  Mild tender palpation laterally.  No calf swelling or tenderness.  Negative Homans' sign.  Negative straight leg raise test.  2+ pulses bilateral lower extremity.  Capillary refill within normal is distally.  No signs of infection.    Diagnostics  [ none]  No results found.     Procedure  [ none]    Assessment  Left hip osteoarthritis    Treatment plan  1.  This time we had a discussion in regards to continued conservative treatment  2.  He will continue with meloxicam as needed.  Prescription for muscle relaxants sent to the pharmacy today for muscle spasm.  3.  He will follow-up with us if he needs anything in future.  Paperwork was updated.  4.  All of the patient's questions were answered.    This note was prepared using voice recognition software.  The details of this  note are correct and have been reviewed, and corrected to the best of my ability.  Some grammatical areas may persist related to the Dragon software    Nayan Arteaga PA-C, [facility]  [facility]    [phone]

## 2024-01-30 ENCOUNTER — TELEPHONE (OUTPATIENT)
Dept: OTOLARYNGOLOGY | Facility: CLINIC | Age: 44
End: 2024-01-30
Payer: COMMERCIAL

## 2024-01-30 DIAGNOSIS — E03.9 HYPOTHYROIDISM, UNSPECIFIED TYPE: Primary | ICD-10-CM

## 2024-01-30 RX ORDER — LEVOTHYROXINE SODIUM 100 UG/1
100 TABLET ORAL DAILY
Qty: 30 TABLET | Refills: 0 | Status: SHIPPED | OUTPATIENT
Start: 2024-01-30

## 2024-01-30 NOTE — TELEPHONE ENCOUNTER
Patient is out of his levothyroxine 100 mg. Can you refill or do you want to see him his last visit was back in 2022?

## 2024-02-21 ENCOUNTER — OFFICE VISIT (OUTPATIENT)
Dept: OTOLARYNGOLOGY | Facility: CLINIC | Age: 44
End: 2024-02-21
Payer: COMMERCIAL

## 2024-02-21 DIAGNOSIS — E03.9 HYPOTHYROIDISM, UNSPECIFIED TYPE: Primary | ICD-10-CM

## 2024-02-21 PROCEDURE — 1036F TOBACCO NON-USER: CPT | Performed by: OTOLARYNGOLOGY

## 2024-02-21 PROCEDURE — 99213 OFFICE O/P EST LOW 20 MIN: CPT | Performed by: OTOLARYNGOLOGY

## 2024-02-21 RX ORDER — LEVOTHYROXINE SODIUM 100 UG/1
100 TABLET ORAL
Qty: 30 TABLET | Refills: 0 | Status: SHIPPED | OUTPATIENT
Start: 2024-02-21 | End: 2025-02-20

## 2024-02-21 NOTE — PROGRESS NOTES
Jonah Velasquez is a 43 y.o. year old male patient with FU ON THYROID / MED REFILLS       Patient presents to the office today for follow-up on thyroid with known history of hypothyroidism taking 100 mcg of Synthroid.  Patient is in need of a refill of Synthroid but has not had a repeat TSH since at least September 2022.  Patient here today for further assessment.  He does complain of some mild fatigue which she states is new over the last several weeks.        Physical Exam:   General appearance: No acute distress. Normal facies. Symmetric facial movement. No gross lesions of the face are noted.  The external ear structures appear normal. The ear canals patent and the tympanic membranes are intact without evidence of air-fluid levels, retraction, or congenital defects.  Anterior rhinoscopy notes essentially a midline nasal septum. Examination is noted for normal healthy mucosal membranes without any evidence of lesions, polyps, or exudate. The tongue is normally mobile. There are no lesions on the gingiva, buccal, or oral mucosa. There are no oral cavity masses.  The neck is negative for mass lymphadenopathy. The trachea and parotid are clear. The thyroid bed is grossly unremarkable. The salivary gland structures are grossly unremarkable.    Assessment/Plan   1.  Hypothyroidism    Patient with known history of hypothyroidism.  Patient will be given a 1 month refill on Synthroid 100 mcg however will be set up for repeat TSH as this has not been completed in greater than 1 year.  We will contact patient with the results and adjust the dose accordingly.  All questions were answered in this regard accordingly.

## 2024-02-24 ENCOUNTER — HOSPITAL ENCOUNTER (OUTPATIENT)
Dept: LAB | Age: 44
Discharge: HOME | End: 2024-02-24
Payer: COMMERCIAL

## 2024-02-24 DIAGNOSIS — Z13.1 SCREENING FOR DIABETES MELLITUS: ICD-10-CM

## 2024-02-24 DIAGNOSIS — Z00.00 ENCOUNTER FOR WELL ADULT EXAM WITHOUT ABNORMAL FINDINGS: ICD-10-CM

## 2024-02-24 DIAGNOSIS — Z13.220 NEED FOR LIPID SCREENING: ICD-10-CM

## 2024-02-24 LAB
ALBUMIN SERPL-MCNC: 4.4 G/DL (ref 3.5–4.6)
ALP SERPL-CCNC: 55 U/L (ref 35–104)
ALT SERPL-CCNC: 21 U/L (ref 0–41)
ANION GAP SERPL CALCULATED.3IONS-SCNC: 11 MEQ/L (ref 9–15)
AST SERPL-CCNC: 27 U/L (ref 0–40)
BASOPHILS # BLD: 0 K/UL (ref 0–0.2)
BASOPHILS NFR BLD: 0.3 %
BILIRUB SERPL-MCNC: 0.3 MG/DL (ref 0.2–0.7)
BUN SERPL-MCNC: 19 MG/DL (ref 6–20)
CALCIUM SERPL-MCNC: 9.5 MG/DL (ref 8.5–9.9)
CHLORIDE SERPL-SCNC: 105 MEQ/L (ref 95–107)
CHOLEST SERPL-MCNC: 180 MG/DL (ref 0–199)
CO2 SERPL-SCNC: 26 MEQ/L (ref 20–31)
CREAT SERPL-MCNC: 0.76 MG/DL (ref 0.7–1.2)
EOSINOPHIL # BLD: 0.2 K/UL (ref 0–0.7)
EOSINOPHIL NFR BLD: 2.4 %
ERYTHROCYTE [DISTWIDTH] IN BLOOD BY AUTOMATED COUNT: 11.9 % (ref 11.5–14.5)
GLOBULIN SER CALC-MCNC: 3 G/DL (ref 2.3–3.5)
GLUCOSE SERPL-MCNC: 97 MG/DL (ref 70–99)
HBA1C MFR BLD: 5.4 % (ref 4.8–5.9)
HCT VFR BLD AUTO: 46.3 % (ref 42–52)
HDLC SERPL-MCNC: 47 MG/DL (ref 40–59)
HGB BLD-MCNC: 15.2 G/DL (ref 14–18)
LDLC SERPL CALC-MCNC: 120 MG/DL (ref 0–129)
LYMPHOCYTES # BLD: 1.7 K/UL (ref 1–4.8)
LYMPHOCYTES NFR BLD: 26.1 %
MCH RBC QN AUTO: 30.5 PG (ref 27–31.3)
MCHC RBC AUTO-ENTMCNC: 32.8 % (ref 33–37)
MCV RBC AUTO: 93 FL (ref 79–92.2)
MONOCYTES # BLD: 0.7 K/UL (ref 0.2–0.8)
MONOCYTES NFR BLD: 11.1 %
NEUTROPHILS # BLD: 3.8 K/UL (ref 1.4–6.5)
NEUTS SEG NFR BLD: 59.8 %
PLATELET # BLD AUTO: 308 K/UL (ref 130–400)
POTASSIUM SERPL-SCNC: 4.5 MEQ/L (ref 3.4–4.9)
PROT SERPL-MCNC: 7.4 G/DL (ref 6.3–8)
RBC # BLD AUTO: 4.98 M/UL (ref 4.7–6.1)
SODIUM SERPL-SCNC: 142 MEQ/L (ref 135–144)
TRIGL SERPL-MCNC: 63 MG/DL (ref 0–150)
TSH SERPL-MCNC: 2.04 UIU/ML (ref 0.44–3.86)
WBC # BLD AUTO: 6.3 K/UL (ref 4.8–10.8)

## 2024-02-24 PROCEDURE — 36415 COLL VENOUS BLD VENIPUNCTURE: CPT

## 2024-02-24 PROCEDURE — 80061 LIPID PANEL: CPT

## 2024-02-24 PROCEDURE — 80053 COMPREHEN METABOLIC PANEL: CPT

## 2024-02-24 PROCEDURE — 85025 COMPLETE CBC W/AUTO DIFF WBC: CPT

## 2024-02-24 PROCEDURE — 83036 HEMOGLOBIN GLYCOSYLATED A1C: CPT

## 2024-02-24 PROCEDURE — 84443 ASSAY THYROID STIM HORMONE: CPT

## 2024-02-29 ENCOUNTER — TELEPHONE (OUTPATIENT)
Dept: OTOLARYNGOLOGY | Facility: CLINIC | Age: 44
End: 2024-02-29
Payer: COMMERCIAL

## 2024-02-29 DIAGNOSIS — E03.9 HYPOTHYROIDISM, UNSPECIFIED TYPE: Primary | ICD-10-CM

## 2024-02-29 RX ORDER — LEVOTHYROXINE SODIUM 100 UG/1
100 TABLET ORAL
Qty: 30 TABLET | Refills: 11 | Status: SHIPPED | OUTPATIENT
Start: 2024-02-29 | End: 2025-02-28

## 2024-02-29 NOTE — TELEPHONE ENCOUNTER
Pt had updated TSH at [facility] 02/24 and needs a refill of his levothyroxine. He's not sure if his meds need adjusted with the updated blood work.

## 2024-07-09 ENCOUNTER — OFFICE VISIT (OUTPATIENT)
Dept: INTERNAL MEDICINE | Age: 44
End: 2024-07-09
Payer: COMMERCIAL

## 2024-07-09 VITALS
BODY MASS INDEX: 30.29 KG/M2 | SYSTOLIC BLOOD PRESSURE: 120 MMHG | DIASTOLIC BLOOD PRESSURE: 74 MMHG | HEIGHT: 74 IN | WEIGHT: 236 LBS | HEART RATE: 86 BPM | OXYGEN SATURATION: 98 % | TEMPERATURE: 97.2 F

## 2024-07-09 DIAGNOSIS — K21.9 GASTROESOPHAGEAL REFLUX DISEASE WITHOUT ESOPHAGITIS: Primary | ICD-10-CM

## 2024-07-09 DIAGNOSIS — S46.102A INJURY OF TENDON OF LONG HEAD OF LEFT BICEPS, INITIAL ENCOUNTER: ICD-10-CM

## 2024-07-09 DIAGNOSIS — R53.83 LOW ENERGY: ICD-10-CM

## 2024-07-09 PROCEDURE — 99214 OFFICE O/P EST MOD 30 MIN: CPT | Performed by: PHYSICIAN ASSISTANT

## 2024-07-09 RX ORDER — VALACYCLOVIR HYDROCHLORIDE 1 G/1
2000 TABLET, FILM COATED ORAL 2 TIMES DAILY
Qty: 4 TABLET | Refills: 3 | Status: SHIPPED | OUTPATIENT
Start: 2024-07-09

## 2024-07-09 RX ORDER — ONDANSETRON 4 MG/1
4 TABLET, FILM COATED ORAL 3 TIMES DAILY PRN
Qty: 30 TABLET | Refills: 0 | Status: SHIPPED | OUTPATIENT
Start: 2024-07-09

## 2024-07-09 RX ORDER — PANTOPRAZOLE SODIUM 20 MG/1
20 TABLET, DELAYED RELEASE ORAL
Qty: 90 TABLET | Refills: 3 | Status: SHIPPED | OUTPATIENT
Start: 2024-07-09

## 2024-07-09 ASSESSMENT — PATIENT HEALTH QUESTIONNAIRE - PHQ9
SUM OF ALL RESPONSES TO PHQ QUESTIONS 1-9: 0
1. LITTLE INTEREST OR PLEASURE IN DOING THINGS: NOT AT ALL
SUM OF ALL RESPONSES TO PHQ9 QUESTIONS 1 & 2: 0
SUM OF ALL RESPONSES TO PHQ QUESTIONS 1-9: 0
2. FEELING DOWN, DEPRESSED OR HOPELESS: NOT AT ALL

## 2024-07-09 NOTE — PROGRESS NOTES
Khoi Keller (: 1980) is a 43 y.o. male, Established patient, here for evaluation of the following chief complaint(s):  Gastroesophageal Reflux (Ongoing x 2-3 weeks.  Vomiting on .  Unable to keep anything in. /Denies any fevers/body aches ), Mouth Lesions (Gets cold sores occasionally.  Would like something when he has a flare), and Arm Injury (Pt injured his left bicep a couple weeks ago on a slip n slide.  It bruised, but never hurt.  There are a couple of hard lumps.  )        ASSESSMENT/PLAN:  1. Gastroesophageal reflux disease without esophagitis  - Non-pharmacologic treatments were discussed including: eating smaller meals, elevation of the head of bed at night, avoidance of caffeine, chocolate, nicotine and peppermint, and avoiding tight fitting clothing.   - pantoprazole (PROTONIX) 20 MG tablet; Take 1 tablet by mouth every morning (before breakfast)  Dispense: 90 tablet; Refill: 3    2. Low energy  - Testosterone, total; Future    3. Injury of tendon of long head of left biceps, initial encounter  -Likely an injury to the tendon  -Where he was palpating was likely swelling due to the injury  -Advised him to return if any problems occur        No follow-ups on file.    SUBJECTIVE/OBJECTIVE:  HPI  Also vomiting x 2 days on say and Sun this week  Diarrhea with reflux x 2 days   He was feverish   He is better now       Low energy  Some issues with sexually  He wants his testosterone checked      Arm injury  States he injured his left bicep a few weeks ago while on a slip inside  States the bicep bruised but never hurt  He feels like there is a few lumps in the area now    Review of Systems   HENT:  Positive for mouth sores. Negative for sore throat.    Respiratory:  Negative for cough and wheezing.    Gastrointestinal:  Positive for abdominal pain.       Physical Exam  Constitutional:       Appearance: Normal appearance. He is normal weight.   HENT:      Head: Normocephalic.

## 2024-07-10 ENCOUNTER — OFFICE VISIT (OUTPATIENT)
Dept: ORTHOPEDIC SURGERY | Facility: CLINIC | Age: 44
End: 2024-07-10
Payer: COMMERCIAL

## 2024-07-10 ENCOUNTER — HOSPITAL ENCOUNTER (OUTPATIENT)
Dept: RADIOLOGY | Facility: CLINIC | Age: 44
Discharge: HOME | End: 2024-07-10
Payer: COMMERCIAL

## 2024-07-10 DIAGNOSIS — M16.12 PRIMARY OSTEOARTHRITIS OF LEFT HIP: Primary | ICD-10-CM

## 2024-07-10 DIAGNOSIS — M25.552 PAIN OF LEFT HIP: ICD-10-CM

## 2024-07-10 PROCEDURE — 99213 OFFICE O/P EST LOW 20 MIN: CPT | Performed by: ORTHOPAEDIC SURGERY

## 2024-07-10 PROCEDURE — 73502 X-RAY EXAM HIP UNI 2-3 VIEWS: CPT | Mod: LT

## 2024-07-10 PROCEDURE — 73502 X-RAY EXAM HIP UNI 2-3 VIEWS: CPT | Mod: LEFT SIDE | Performed by: ORTHOPAEDIC SURGERY

## 2024-07-10 PROCEDURE — 1036F TOBACCO NON-USER: CPT | Performed by: ORTHOPAEDIC SURGERY

## 2024-07-10 RX ORDER — MELOXICAM 15 MG/1
15 TABLET ORAL
Qty: 30 TABLET | Refills: 2 | Status: SHIPPED | OUTPATIENT
Start: 2024-07-10

## 2024-07-10 NOTE — PROGRESS NOTES
Subjective    Patient ID: Jonah    Chief Complaint:   Chief Complaint   Patient presents with    Left Hip - Pain     OA, xrays today       History of present illness    43-year-old gentleman presented clinic today for follow-up evaluation left hip osteoarthritis.  He states meloxicam seems to be helping quite a bit.  He has a busy summer due to his job being more intense in the summer.  Seems to be doing well with conservative treatment plan wants to keep going.      Past medical , Surgical, Family and social history reviewed.      Physical exam  General: No acute distress and breathing comfortably.  Patient is pleasant and cooperative with the examination.    Extremity  Left hip is neurovascular intact.  Good range of motion.  Mild groin pain.  Negative straight leg raise test.  2+ pulses bilateral extremity.  Capillary fill lowness distally.  No sign of infection.    Diagnostics  Please see dictated x-ray report  No results found.     Procedure  [ none]    Assessment  Left hip osteoarthritis    Treatment plan  1.  Will go ahead and continue with oral meloxicam new prescription sent to the pharmacy  2.  We updated his work FMLA paperwork.  3.  He will continue weightbearing activity as tolerated.  Call us when he is ready to schedule or if he needs another follow-up.  For complete plan and/or surgical details, please refer to Dr. Parham's portion of the split/shared dictation.  4.  All of the patient's questions were answered.    Orders Placed This Encounter    XR hip left with pelvis when performed 2 or 3 views       This note was prepared using voice recognition software.  The details of this note are correct and have been reviewed, and corrected to the best of my ability.  Some grammatical areas may persist related to the Dragon software    Nayan Arteaga PA-C, [facility]  [facility]  [facility] [place]    [phone]    In a face-to-face encounter performed today, I Arron Parham MD performed  a history and physical examination, discussed pertinent diagnostic studies if indicated, and discussed diagnosis and management strategies with both the patient and the midlevel provider.  I reviewed the midlevel's note and agree with the documented findings and plan of care.  Greater than 50% of the evaluation and treatment decision was performed by the physician myself during today's visit.    43 gentleman well-known to me status post right total hip replacement right hip is doing great left hip is giving him some trouble but he wanted to check in just to see how things are coming along he is been able to perform his activities of daily living has been taking meloxicam without side effects.  On examination he has mild pain with internal ex rotation of the hip straight leg is testing is negative neurologically intact distally brisk cap refill.  X-rays are obtained today see my dictated x-ray report.  Impression is osteoarthritis left hip.  Plan is continue current treatment regimen prescription for meloxicam sent to pharmacy he will call if he has increased pain or discomfort otherwise follow-up as needed.      Patient has severe impairment related to her presenting condition.  It is significantly impairing bodily function.  We did discuss surgical and nonsurgical options.    This note was prepared using voice recognition software.  The details of this note are correct and have been reviewed, and corrected to the best of my ability.  Some grammatical areas may persist related to the Dragon software    Arron Parham MD  Senior Attending Physician  [facility]    [phone]

## 2024-11-06 ENCOUNTER — OFFICE VISIT (OUTPATIENT)
Dept: INTERNAL MEDICINE | Age: 44
End: 2024-11-06
Payer: COMMERCIAL

## 2024-11-06 VITALS
DIASTOLIC BLOOD PRESSURE: 90 MMHG | SYSTOLIC BLOOD PRESSURE: 142 MMHG | TEMPERATURE: 97.3 F | OXYGEN SATURATION: 96 % | HEART RATE: 85 BPM | WEIGHT: 248.6 LBS | BODY MASS INDEX: 31.92 KG/M2

## 2024-11-06 DIAGNOSIS — K21.9 GASTROESOPHAGEAL REFLUX DISEASE WITHOUT ESOPHAGITIS: ICD-10-CM

## 2024-11-06 DIAGNOSIS — M16.11 PRIMARY OSTEOARTHRITIS OF RIGHT HIP: ICD-10-CM

## 2024-11-06 DIAGNOSIS — Z12.11 ENCOUNTER FOR COLORECTAL CANCER SCREENING: ICD-10-CM

## 2024-11-06 DIAGNOSIS — Z12.12 ENCOUNTER FOR COLORECTAL CANCER SCREENING: ICD-10-CM

## 2024-11-06 DIAGNOSIS — R53.83 OTHER FATIGUE: ICD-10-CM

## 2024-11-06 DIAGNOSIS — E03.9 ACQUIRED HYPOTHYROIDISM: Primary | ICD-10-CM

## 2024-11-06 PROCEDURE — 99214 OFFICE O/P EST MOD 30 MIN: CPT | Performed by: STUDENT IN AN ORGANIZED HEALTH CARE EDUCATION/TRAINING PROGRAM

## 2024-11-06 RX ORDER — MELOXICAM 15 MG/1
15 TABLET ORAL DAILY
Qty: 90 TABLET | Refills: 0 | Status: SHIPPED | OUTPATIENT
Start: 2024-11-06 | End: 2025-02-04

## 2024-11-06 RX ORDER — PANTOPRAZOLE SODIUM 20 MG/1
20 TABLET, DELAYED RELEASE ORAL
Qty: 90 TABLET | Refills: 3 | Status: SHIPPED | OUTPATIENT
Start: 2024-11-06

## 2024-11-06 RX ORDER — LEVOTHYROXINE SODIUM 100 UG/1
100 TABLET ORAL DAILY
Qty: 90 TABLET | Refills: 1 | Status: SHIPPED | OUTPATIENT
Start: 2024-11-06

## 2024-11-06 RX ORDER — VALACYCLOVIR HYDROCHLORIDE 1 G/1
2000 TABLET, FILM COATED ORAL 2 TIMES DAILY
Qty: 4 TABLET | Refills: 3 | Status: SHIPPED | OUTPATIENT
Start: 2024-11-06

## 2024-11-06 SDOH — ECONOMIC STABILITY: INCOME INSECURITY: HOW HARD IS IT FOR YOU TO PAY FOR THE VERY BASICS LIKE FOOD, HOUSING, MEDICAL CARE, AND HEATING?: NOT VERY HARD

## 2024-11-06 SDOH — ECONOMIC STABILITY: FOOD INSECURITY: WITHIN THE PAST 12 MONTHS, YOU WORRIED THAT YOUR FOOD WOULD RUN OUT BEFORE YOU GOT MONEY TO BUY MORE.: NEVER TRUE

## 2024-11-06 SDOH — ECONOMIC STABILITY: FOOD INSECURITY: WITHIN THE PAST 12 MONTHS, THE FOOD YOU BOUGHT JUST DIDN'T LAST AND YOU DIDN'T HAVE MONEY TO GET MORE.: NEVER TRUE

## 2024-11-06 NOTE — PROGRESS NOTES
[facility] Internal Medicine  [facility]   [address] [place] [id number]   P: [phone]      Khoi Keller (:  1980) is a 44 y.o. male,Established patient, here for evaluation of the following chief complaint(s):  New Patient (Previous PCP Loly Gipson ) and Annual Exam      Assessment & Plan   ASSESSMENT/PLAN:  1. Acquired hypothyroidism  -     levothyroxine (SYNTHROID) 100 MCG tablet; Take 1 tablet by mouth daily, Disp-90 tablet, R-1Normal  -     Ambulatory referral to Endocrinology  -     TSH with Reflex; Future  2. Gastroesophageal reflux disease without esophagitis  -     pantoprazole (PROTONIX) 20 MG tablet; Take 1 tablet by mouth every morning (before breakfast), Disp-90 tablet, R-3Normal  3. Encounter for colorectal cancer screening  -     Iva Louise MD, Gastroenterology, [place]  4. Primary osteoarthritis of right hip  -     meloxicam (MOBIC) 15 MG tablet; Take 1 tablet by mouth daily, Disp-90 tablet, R-0Normal  5. Other fatigue  -     Testosterone, free, total; Future  -     Testosterone, free, total; Future      No results found for any visits on 24.     No follow-ups on file.         Subjective   SUBJECTIVE/OBJECTIVE:  HPI    Mr. Khoi Keller is a 43 yo male with pmh of hypothyroidism, multinodular goiter, s/p R thyroid lobectomy and excision of parathyroid, OA of the R hip, s/p R total hip replacement, Hodgkin lymphoma who presents to [facility] care.     Patient follows with Dr. Marcano with  Orthopedics. He is currently following for L hip OA. He is on Meloxicam.     He does not follow with Endocrinology since thyroid lobectomy     ROS:     Negative except as noted in HPI         Objective   Physical Exam  Constitutional:       Appearance: Normal appearance.   HENT:      Head: Normocephalic and atraumatic.   Eyes:      Extraocular Movements: Extraocular movements intact.   Cardiovascular:      Rate and Rhythm: Normal rate and regular

## 2024-12-06 PROBLEM — Z12.12 ENCOUNTER FOR COLORECTAL CANCER SCREENING: Status: RESOLVED | Noted: 2024-11-06 | Resolved: 2024-12-06

## 2024-12-06 PROBLEM — Z12.11 ENCOUNTER FOR COLORECTAL CANCER SCREENING: Status: RESOLVED | Noted: 2024-11-06 | Resolved: 2024-12-06

## 2024-12-19 RX ORDER — POLYETHYLENE GLYCOL 3350, SODIUM CHLORIDE, SODIUM BICARBONATE, POTASSIUM CHLORIDE 420; 11.2; 5.72; 1.48 G/4L; G/4L; G/4L; G/4L
4000 POWDER, FOR SOLUTION ORAL ONCE
Qty: 4000 ML | Refills: 0 | Status: SHIPPED | OUTPATIENT
Start: 2024-12-19 | End: 2024-12-19

## 2024-12-23 ENCOUNTER — PREP FOR PROCEDURE (OUTPATIENT)
Dept: GASTROENTEROLOGY | Age: 44
End: 2024-12-23

## 2024-12-23 RX ORDER — SODIUM CHLORIDE 0.9 % (FLUSH) 0.9 %
5-40 SYRINGE (ML) INJECTION EVERY 12 HOURS SCHEDULED
Status: CANCELLED | OUTPATIENT
Start: 2024-12-23

## 2024-12-23 RX ORDER — SODIUM CHLORIDE 9 MG/ML
INJECTION, SOLUTION INTRAVENOUS PRN
Status: CANCELLED | OUTPATIENT
Start: 2024-12-23

## 2024-12-23 RX ORDER — SODIUM CHLORIDE 0.9 % (FLUSH) 0.9 %
5-40 SYRINGE (ML) INJECTION PRN
Status: CANCELLED | OUTPATIENT
Start: 2024-12-23

## 2024-12-23 RX ORDER — SODIUM CHLORIDE 9 MG/ML
INJECTION, SOLUTION INTRAVENOUS CONTINUOUS
Status: CANCELLED | OUTPATIENT
Start: 2024-12-23

## 2025-01-10 ENCOUNTER — OFFICE VISIT (OUTPATIENT)
Dept: ENDOCRINOLOGY | Age: 45
End: 2025-01-10

## 2025-01-10 VITALS
SYSTOLIC BLOOD PRESSURE: 126 MMHG | HEIGHT: 74 IN | HEART RATE: 72 BPM | DIASTOLIC BLOOD PRESSURE: 62 MMHG | WEIGHT: 256 LBS | BODY MASS INDEX: 32.85 KG/M2

## 2025-01-10 DIAGNOSIS — R53.82 CHRONIC FATIGUE: ICD-10-CM

## 2025-01-10 DIAGNOSIS — E03.9 ACQUIRED HYPOTHYROIDISM: Primary | ICD-10-CM

## 2025-01-10 DIAGNOSIS — R68.82 LOW LIBIDO: ICD-10-CM

## 2025-01-10 ASSESSMENT — ENCOUNTER SYMPTOMS
SORE THROAT: 0
ABDOMINAL PAIN: 0
NAUSEA: 0
COUGH: 0
WHEEZING: 0
RHINORRHEA: 0
VOMITING: 0
SHORTNESS OF BREATH: 0
DIARRHEA: 0
SINUS PRESSURE: 0

## 2025-01-10 NOTE — PROGRESS NOTES
1/10/2025    Assessment:       Diagnosis Orders   1. Acquired hypothyroidism  Comprehensive Metabolic Panel    TSH reflex to FT4    T4, Free      2. Chronic fatigue  Comprehensive Metabolic Panel    Testosterone, free, total      3. Low libido  Comprehensive Metabolic Panel    Testosterone, free, total        PLAN:     Levothyroxine 100 mcg by mouth daily  Labs today, I will make further recommendations based on those results   Repeat labs 1 week before your next appointment  Follow-up with me in 3 months      Orders Placed This Encounter   Procedures    Comprehensive Metabolic Panel     Standing Status:   Future     Standing Expiration Date:   1/10/2026    TSH reflex to FT4     Standing Status:   Standing     Number of Occurrences:   10     Standing Expiration Date:   1/10/2026    T4, Free     Standing Status:   Standing     Number of Occurrences:   10     Standing Expiration Date:   1/10/2026    Testosterone, free, total     Standing Status:   Future     Standing Expiration Date:   1/10/2026     No orders of the defined types were placed in this encounter.    No follow-ups on file.  Subjective:     Chief Complaint   Patient presents with    New Patient    Hypothyroidism    Multinodular Goiter     Vitals:    01/10/25 1440   BP: 126/62   Pulse: 72   Weight: 116.1 kg (256 lb)   Height: 1.88 m (6' 2\")     Wt Readings from Last 3 Encounters:   01/10/25 116.1 kg (256 lb)   11/06/24 112.8 kg (248 lb 9.6 oz)   07/09/24 107 kg (236 lb)     BP Readings from Last 3 Encounters:   01/10/25 126/62   11/06/24 (!) 142/90   07/09/24 120/74     Khoi is a 44-year-old male presents today for evaluation of his thyroid.  He has a history of Hodgkin's lymphoma, and in 2021 he noticed a lump on the anterior area of his neck.  Ultrasound revealed a multinodular thyroid suspicious for cancer.  He was referred to an ENT surgeon and underwent right thyroidectomy.  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

## 2025-01-18 ENCOUNTER — HOSPITAL ENCOUNTER (OUTPATIENT)
Dept: LAB | Age: 45
Discharge: HOME | End: 2025-01-18
Payer: COMMERCIAL

## 2025-01-18 DIAGNOSIS — R68.82 LOW LIBIDO: ICD-10-CM

## 2025-01-18 DIAGNOSIS — E03.9 ACQUIRED HYPOTHYROIDISM: ICD-10-CM

## 2025-01-18 DIAGNOSIS — R53.82 CHRONIC FATIGUE: ICD-10-CM

## 2025-01-18 LAB
ALBUMIN SERPL-MCNC: 4.7 G/DL (ref 3.5–4.6)
ALP SERPL-CCNC: 62 U/L (ref 35–104)
ALT SERPL-CCNC: 28 U/L (ref 0–41)
ANION GAP SERPL CALCULATED.3IONS-SCNC: 12 MEQ/L (ref 9–15)
AST SERPL-CCNC: 33 U/L (ref 0–40)
BILIRUB SERPL-MCNC: 0.5 MG/DL (ref 0.2–0.7)
BUN SERPL-MCNC: 15 MG/DL (ref 6–20)
CALCIUM SERPL-MCNC: 9.3 MG/DL (ref 8.5–9.9)
CHLORIDE SERPL-SCNC: 104 MEQ/L (ref 95–107)
CO2 SERPL-SCNC: 25 MEQ/L (ref 20–31)
CREAT SERPL-MCNC: 1.13 MG/DL (ref 0.7–1.2)
GLOBULIN SER CALC-MCNC: 3 G/DL (ref 2.3–3.5)
GLUCOSE SERPL-MCNC: 80 MG/DL (ref 70–99)
POTASSIUM SERPL-SCNC: 4.9 MEQ/L (ref 3.4–4.9)
PROT SERPL-MCNC: 7.7 G/DL (ref 6.3–8)
SHBG SERPL-SCNC: 24 NMOL/L (ref 17–56)
SODIUM SERPL-SCNC: 141 MEQ/L (ref 135–144)
T4 FREE SERPL-MCNC: 1.17 NG/DL (ref 0.84–1.68)
TESTOST FREE SERPL-MCNC: 61.8 PG/ML (ref 47–244)
TESTOST SERPL-MCNC: 267 NG/DL (ref 249–836)
TSH REFLEX: 1.61 UIU/ML (ref 0.44–3.86)

## 2025-01-18 PROCEDURE — 84439 ASSAY OF FREE THYROXINE: CPT

## 2025-01-18 PROCEDURE — 84270 ASSAY OF SEX HORMONE GLOBUL: CPT

## 2025-01-18 PROCEDURE — 84443 ASSAY THYROID STIM HORMONE: CPT

## 2025-01-18 PROCEDURE — 84403 ASSAY OF TOTAL TESTOSTERONE: CPT

## 2025-01-18 PROCEDURE — 36415 COLL VENOUS BLD VENIPUNCTURE: CPT

## 2025-01-18 PROCEDURE — 80053 COMPREHEN METABOLIC PANEL: CPT

## 2025-01-22 ENCOUNTER — ANESTHESIA (OUTPATIENT)
Dept: OPERATING ROOM | Age: 45
End: 2025-01-22
Payer: COMMERCIAL

## 2025-01-22 ENCOUNTER — ANESTHESIA EVENT (OUTPATIENT)
Dept: OPERATING ROOM | Age: 45
End: 2025-01-22
Payer: COMMERCIAL

## 2025-01-22 ENCOUNTER — HOSPITAL ENCOUNTER (OUTPATIENT)
Age: 45
Setting detail: OUTPATIENT SURGERY
Discharge: HOME | End: 2025-01-22
Attending: SPECIALIST | Admitting: SPECIALIST
Payer: COMMERCIAL

## 2025-01-22 VITALS
SYSTOLIC BLOOD PRESSURE: 112 MMHG | RESPIRATION RATE: 16 BRPM | BODY MASS INDEX: 32.08 KG/M2 | HEIGHT: 74 IN | DIASTOLIC BLOOD PRESSURE: 81 MMHG | WEIGHT: 250 LBS | TEMPERATURE: 97.9 F | OXYGEN SATURATION: 97 % | HEART RATE: 86 BPM

## 2025-01-22 DIAGNOSIS — Z12.11 COLON CANCER SCREENING: ICD-10-CM

## 2025-01-22 PROBLEM — D12.2 ADENOMATOUS POLYP OF ASCENDING COLON: Status: ACTIVE | Noted: 2025-01-22

## 2025-01-22 PROBLEM — D12.4 ADENOMATOUS POLYP OF DESCENDING COLON: Status: ACTIVE | Noted: 2025-01-22

## 2025-01-22 PROBLEM — D12.5 ADENOMATOUS POLYP OF SIGMOID COLON: Status: ACTIVE | Noted: 2025-01-22

## 2025-01-22 PROCEDURE — 2580000003 HC RX 258: Performed by: SPECIALIST

## 2025-01-22 PROCEDURE — 88305 TISSUE EXAM BY PATHOLOGIST: CPT

## 2025-01-22 PROCEDURE — 6360000002 HC RX W HCPCS

## 2025-01-22 PROCEDURE — 3700000000 HC ANESTHESIA ATTENDED CARE: Performed by: SPECIALIST

## 2025-01-22 PROCEDURE — 3700000001 HC ADD 15 MINUTES (ANESTHESIA): Performed by: SPECIALIST

## 2025-01-22 PROCEDURE — 45380 COLONOSCOPY AND BIOPSY: CPT | Performed by: SPECIALIST

## 2025-01-22 PROCEDURE — 2500000003 HC RX 250 WO HCPCS: Performed by: SPECIALIST

## 2025-01-22 PROCEDURE — 2709999900 HC NON-CHARGEABLE SUPPLY: Performed by: SPECIALIST

## 2025-01-22 PROCEDURE — 45385 COLONOSCOPY W/LESION REMOVAL: CPT | Performed by: SPECIALIST

## 2025-01-22 PROCEDURE — 7100000011 HC PHASE II RECOVERY - ADDTL 15 MIN: Performed by: SPECIALIST

## 2025-01-22 PROCEDURE — 7100000010 HC PHASE II RECOVERY - FIRST 15 MIN: Performed by: SPECIALIST

## 2025-01-22 PROCEDURE — 3609027000 HC COLONOSCOPY: Performed by: SPECIALIST

## 2025-01-22 RX ORDER — SODIUM CHLORIDE 9 MG/ML
INJECTION, SOLUTION INTRAVENOUS PRN
Status: DISCONTINUED | OUTPATIENT
Start: 2025-01-22 | End: 2025-01-22 | Stop reason: HOSPADM

## 2025-01-22 RX ORDER — PROPOFOL 10 MG/ML
INJECTION, EMULSION INTRAVENOUS
Status: DISCONTINUED | OUTPATIENT
Start: 2025-01-22 | End: 2025-01-22 | Stop reason: SDUPTHER

## 2025-01-22 RX ORDER — GLYCOPYRROLATE 1 MG/5 ML
SYRINGE (ML) INTRAVENOUS
Status: DISCONTINUED | OUTPATIENT
Start: 2025-01-22 | End: 2025-01-22 | Stop reason: SDUPTHER

## 2025-01-22 RX ORDER — SODIUM CHLORIDE 0.9 % (FLUSH) 0.9 %
5-40 SYRINGE (ML) INJECTION PRN
Status: DISCONTINUED | OUTPATIENT
Start: 2025-01-22 | End: 2025-01-22 | Stop reason: HOSPADM

## 2025-01-22 RX ORDER — SODIUM CHLORIDE, SODIUM LACTATE, POTASSIUM CHLORIDE, CALCIUM CHLORIDE 600; 310; 30; 20 MG/100ML; MG/100ML; MG/100ML; MG/100ML
INJECTION, SOLUTION INTRAVENOUS CONTINUOUS
Status: DISCONTINUED | OUTPATIENT
Start: 2025-01-22 | End: 2025-01-22 | Stop reason: HOSPADM

## 2025-01-22 RX ORDER — LIDOCAINE HYDROCHLORIDE 20 MG/ML
INJECTION, SOLUTION EPIDURAL; INFILTRATION; INTRACAUDAL; PERINEURAL
Status: DISCONTINUED | OUTPATIENT
Start: 2025-01-22 | End: 2025-01-22 | Stop reason: SDUPTHER

## 2025-01-22 RX ORDER — SODIUM CHLORIDE 9 MG/ML
INJECTION, SOLUTION INTRAVENOUS CONTINUOUS
Status: DISCONTINUED | OUTPATIENT
Start: 2025-01-22 | End: 2025-01-22 | Stop reason: RX

## 2025-01-22 RX ORDER — SODIUM CHLORIDE 0.9 % (FLUSH) 0.9 %
5-40 SYRINGE (ML) INJECTION EVERY 12 HOURS SCHEDULED
Status: DISCONTINUED | OUTPATIENT
Start: 2025-01-22 | End: 2025-01-22 | Stop reason: HOSPADM

## 2025-01-22 RX ADMIN — PROPOFOL 50 MG: 10 INJECTION, EMULSION INTRAVENOUS at 07:46

## 2025-01-22 RX ADMIN — SODIUM CHLORIDE, SODIUM LACTATE, POTASSIUM CHLORIDE, AND CALCIUM CHLORIDE: .6; .31; .03; .02 INJECTION, SOLUTION INTRAVENOUS at 06:51

## 2025-01-22 RX ADMIN — PROPOFOL 50 MG: 10 INJECTION, EMULSION INTRAVENOUS at 07:52

## 2025-01-22 RX ADMIN — LIDOCAINE HYDROCHLORIDE 3 ML: 20 INJECTION, SOLUTION EPIDURAL; INFILTRATION; INTRACAUDAL; PERINEURAL at 07:32

## 2025-01-22 RX ADMIN — PROPOFOL 50 MG: 10 INJECTION, EMULSION INTRAVENOUS at 07:34

## 2025-01-22 RX ADMIN — PROPOFOL 100 MG: 10 INJECTION, EMULSION INTRAVENOUS at 07:33

## 2025-01-22 RX ADMIN — PROPOFOL 30 MG: 10 INJECTION, EMULSION INTRAVENOUS at 07:37

## 2025-01-22 RX ADMIN — PROPOFOL 50 MG: 10 INJECTION, EMULSION INTRAVENOUS at 07:43

## 2025-01-22 RX ADMIN — PROPOFOL 50 MG: 10 INJECTION, EMULSION INTRAVENOUS at 07:42

## 2025-01-22 RX ADMIN — PROPOFOL 50 MG: 10 INJECTION, EMULSION INTRAVENOUS at 07:49

## 2025-01-22 RX ADMIN — PROPOFOL 120 MG: 10 INJECTION, EMULSION INTRAVENOUS at 07:32

## 2025-01-22 RX ADMIN — PROPOFOL 50 MG: 10 INJECTION, EMULSION INTRAVENOUS at 07:55

## 2025-01-22 RX ADMIN — PROPOFOL 50 MG: 10 INJECTION, EMULSION INTRAVENOUS at 07:40

## 2025-01-22 RX ADMIN — Medication 0.2 MG: at 07:37

## 2025-01-22 RX ADMIN — PROPOFOL 50 MG: 10 INJECTION, EMULSION INTRAVENOUS at 07:39

## 2025-01-22 ASSESSMENT — PAIN - FUNCTIONAL ASSESSMENT
PAIN_FUNCTIONAL_ASSESSMENT: 0-10
PAIN_FUNCTIONAL_ASSESSMENT: 0-10

## 2025-01-22 NOTE — PROGRESS NOTES
Patient ID:  Khoi Keller  [id number]  44 y.o.  1980  Patient received in PACU BED 2 Report received from Anesthesia and Surgical Nurse.   Attached to Monitor, VSS, See Nursing Assessment and Flowsheets for detailed Information  Awake, following commands, answering questions appropriately. O2 Sats>92 on Room air  Taking po fluids well, VSS. Passing flatus.  IV Discontinued per protocol, catheter intact, patient tolerated well.  Discharge Instructions given, patient verbalizes and understanding.  Pt to be discharged to home with responsible adult     Electronically signed by Xiomara Hernandez RN on 1/22/25

## 2025-01-22 NOTE — ANESTHESIA POSTPROCEDURE EVALUATION
Department of Anesthesiology  Postprocedure Note    Patient: Khoi Keller  MRN: [id number]  YOB: 1980  Date of evaluation: 1/22/2025    Procedure Summary       Date: 01/22/25 Room / Location: [address]    Anesthesia Start: 0729 Anesthesia Stop: 0801    Procedure: COLORECTAL CANCER SCREENING, NOT HIGH RISK Diagnosis:       Colon cancer screening      (Colon cancer screening [Z12.11])    Surgeons: Iva Grace MD Responsible Provider: Prisca Faith APRN - CRNA    Anesthesia Type: MAC ASA Status: 2            Anesthesia Type: No value filed.    Fausto Phase I: Fausto Score: 10    Fausto Phase II:      Anesthesia Post Evaluation    Patient location during evaluation: bedside  Patient participation: complete - patient participated  Level of consciousness: awake and awake and alert  Airway patency: patent  Nausea & Vomiting: no nausea and no vomiting  Cardiovascular status: blood pressure returned to baseline and hemodynamically stable  Respiratory status: acceptable  Hydration status: euvolemic  Pain management: adequate        No notable events documented.

## 2025-01-22 NOTE — ANESTHESIA PRE PROCEDURE
Department of Anesthesiology  Preprocedure Note       Name:  Khoi Keller   Age:  44 y.o.  :  1980                                          MRN:  [id number]         Date:  2025      Surgeon: Surgeon(s):  Iva Grace MD    Procedure: Procedure(s):  COLORECTAL CANCER SCREENING, NOT HIGH RISK    Medications prior to admission:   Prior to Admission medications    Medication Sig Start Date End Date Taking? Authorizing Provider   valACYclovir (VALTREX) 1 g tablet Take 2 tablets by mouth 2 times daily  Patient taking differently: Take 2 tablets by mouth 2 times daily PRN 24  Yes Alyce Ahn DO   levothyroxine (SYNTHROID) 100 MCG tablet Take 1 tablet by mouth daily 24   Alyce Ahn DO   meloxicam (MOBIC) 15 MG tablet Take 1 tablet by mouth daily 24  Alyce Ahn DO   pantoprazole (PROTONIX) 20 MG tablet Take 1 tablet by mouth every morning (before breakfast) 24   Alyce Ahn DO       Current medications:    Current Facility-Administered Medications   Medication Dose Route Frequency Provider Last Rate Last Admin    sodium chloride flush 0.9 % injection 5-40 mL  5-40 mL IntraVENous 2 times per day Iva Grace MD        sodium chloride flush 0.9 % injection 5-40 mL  5-40 mL IntraVENous PRN Iva Grace MD        0.9 % sodium chloride infusion   IntraVENous PRN Iva Grace MD        lactated ringers infusion   IntraVENous Continuous Iva Grace  mL/hr at 25 0651 New Bag at 25 0651       Allergies:    Allergies   Allergen Reactions    Tramadol Rash     Rash on legs       Problem List:    Patient Active Problem List   Diagnosis Code    Hodgkin lymphoma (HCC) C81.90    H/O splenectomy Z90.81    Regular astigmatism H52.229    Tear film insufficiency H04.129    Osteoarthritis of right hip M16.11    Status post total replacement of right hip Z96.641    Hip pain, acute, right M25.551    Disorder of joint prosthesis (HCC)

## 2025-01-22 NOTE — H&P
Patient Name: Khoi Keller  : 1980  MRN: [id number]  DATE: 25      ENDOSCOPY  History and Physical    Procedure:    [] Diagnostic Colonoscopy       [x] Screening Colonoscopy  [] EGD      [] ERCP      [] EUS       [] Other    [x] Previous office notes/History and Physical reviewed from the patients chart. Please see EMR for further details of HPI. I have examined the patient's status immediately prior to the procedure and:      Indications/HPI:    []Abdominal Pain  []Cancer- GI/Lung  []Fhx of colon CA/polyps  []History of Polyps  []Meeks’s   []Melena  []Abnormal Imaging  []Dysphagia    []Persistent Pneumonia  []Anemia  []Food Impaction  []History of Polyps  []GI Bleed  []Pulmonary nodule/Mass  []Change in bowel habits []Heartburn/Reflux  []Rectal Bleed (BRBPR)  []Chest Pain - Non Cardiac []Heme (+) Stoo  l[]Ulcers  []Constipation  []Hemoptysis   []Varices  []Diarrhea  []Hypoxemia  []Nausea/Vomiting  []Screening   []Crohns/Colitis  []Other:     Anesthesia:   [x] MAC [] Moderate Sedation   [] General   [] None     ROS: 12 pt Review of Symptoms was negative unless mentioned above    Medications:   Prior to Admission medications    Medication Sig Start Date End Date Taking? Authorizing Provider   valACYclovir (VALTREX) 1 g tablet Take 2 tablets by mouth 2 times daily  Patient taking differently: Take 2 tablets by mouth 2 times daily PRN 24  Yes Alyce Ahn DO   levothyroxine (SYNTHROID) 100 MCG tablet Take 1 tablet by mouth daily 24   Alyce Ahn DO   meloxicam (MOBIC) 15 MG tablet Take 1 tablet by mouth daily 24  Alyce Ahn DO   pantoprazole (PROTONIX) 20 MG tablet Take 1 tablet by mouth every morning (before breakfast) 24   Alyce Ahn DO       Allergies:   Allergies   Allergen Reactions    Tramadol Rash     Rash on legs        History of allergic reaction to anesthesia:  No    Past Medical History:  Past Medical History:   Diagnosis Date    Cancer

## 2025-02-04 ENCOUNTER — OFFICE VISIT (OUTPATIENT)
Dept: ORTHOPEDIC SURGERY | Facility: CLINIC | Age: 45
End: 2025-02-04
Payer: COMMERCIAL

## 2025-02-04 DIAGNOSIS — M16.12 PRIMARY OSTEOARTHRITIS OF LEFT HIP: Primary | ICD-10-CM

## 2025-02-04 DIAGNOSIS — M25.552 PAIN OF LEFT HIP: ICD-10-CM

## 2025-02-04 PROCEDURE — 1036F TOBACCO NON-USER: CPT | Performed by: ORTHOPAEDIC SURGERY

## 2025-02-04 PROCEDURE — 99213 OFFICE O/P EST LOW 20 MIN: CPT | Performed by: ORTHOPAEDIC SURGERY

## 2025-02-04 RX ORDER — MELOXICAM 15 MG/1
15 TABLET ORAL
Qty: 30 TABLET | Refills: 2 | Status: SHIPPED | OUTPATIENT
Start: 2025-02-04

## 2025-02-05 NOTE — PROGRESS NOTES
History of present illness    44-year-old male here for follow-up of his left hip he knows he has significant left hip arthritis he has had a right total hip replacement in the past he says the meloxicam seems to be helping sometimes he finds it easier to sleep in a reclining chair then in his bed laying flat seem to aggravate his hip pain he has good days and bad days.      Past medical , Surgical, Family and social history reviewed.      Physical exam  General: No acute distress and breathing comfortably.  Patient is pleasant and cooperative with the examination.    Extremity  Moderate pain with internal ex rotation of the left hip flexion abduction external rotation was positive straight leg testing is negative.  Neurological intact distally brisk cap refill compartments soft calf is nontender    Diagnostics      No results found.     Procedure  [ none]    Assessment  Left hip arthritis    Treatment plan  1.  The natural history of the condition and its associated treatment alternatives including surgical and nonsurgical options were discussed with the patient at length.  2.  Meloxicam seems to be helping him he is requesting a refill on his anti-inflammatory he understands hip arthritis as he went through this on his right hip he is going to try to continue as long as possible before any hip replacement surgery.  He will follow-up as needed.  3. [   ]  4.  All of the patient's questions were answered.    Orders Placed This Encounter    meloxicam (Mobic) 15 mg tablet       This note was prepared using voice recognition software.  The details of this note are correct and have been reviewed, and corrected to the best of my ability.  Some grammatical areas may persist related to the Dragon software    Arron Parham MD  Senior Attending Physician  [facility]  [facility] [place]    [phone]

## 2025-03-26 DIAGNOSIS — E03.9 HYPOTHYROIDISM, UNSPECIFIED TYPE: Primary | ICD-10-CM

## 2025-04-11 ENCOUNTER — OFFICE VISIT (OUTPATIENT)
Dept: ENDOCRINOLOGY | Age: 45
End: 2025-04-11
Payer: COMMERCIAL

## 2025-04-11 VITALS
HEART RATE: 88 BPM | HEIGHT: 74 IN | WEIGHT: 250 LBS | BODY MASS INDEX: 32.08 KG/M2 | OXYGEN SATURATION: 97 % | DIASTOLIC BLOOD PRESSURE: 94 MMHG | SYSTOLIC BLOOD PRESSURE: 118 MMHG

## 2025-04-11 DIAGNOSIS — E29.1 HYPOGONADISM IN MALE: ICD-10-CM

## 2025-04-11 DIAGNOSIS — Z90.89 H/O PARATHYROIDECTOMY: ICD-10-CM

## 2025-04-11 DIAGNOSIS — E89.0 POSTOPERATIVE HYPOTHYROIDISM: Primary | ICD-10-CM

## 2025-04-11 DIAGNOSIS — Z98.890 H/O PARATHYROIDECTOMY: ICD-10-CM

## 2025-04-11 PROBLEM — E04.2 MULTINODULAR GOITER: Status: RESOLVED | Noted: 2023-07-21 | Resolved: 2025-04-11

## 2025-04-11 PROCEDURE — 99214 OFFICE O/P EST MOD 30 MIN: CPT | Performed by: PHYSICIAN ASSISTANT

## 2025-04-11 RX ORDER — LEVOTHYROXINE SODIUM 100 UG/1
100 TABLET ORAL DAILY
Qty: 90 TABLET | Refills: 1 | Status: SHIPPED | OUTPATIENT
Start: 2025-04-11

## 2025-04-11 ASSESSMENT — ENCOUNTER SYMPTOMS
SORE THROAT: 0
VOMITING: 0
ABDOMINAL PAIN: 0
NAUSEA: 0
SHORTNESS OF BREATH: 0
RHINORRHEA: 0
COUGH: 0
DIARRHEA: 0
SINUS PRESSURE: 0
WHEEZING: 0

## 2025-04-11 NOTE — PROGRESS NOTES
and diet.      Side effects, adverse effects of the medication prescribed today, as well as treatment plan/ rationale and result expectations have been discussed with the patient who expresses understanding and desires to proceed.     Close follow up to evaluate treatment results and for coordination of care.  I have reviewed the patient's medical history in detail and updated the computerized patient record.

## 2025-04-16 ENCOUNTER — HOSPITAL ENCOUNTER (OUTPATIENT)
Dept: LAB | Age: 45
Discharge: HOME | End: 2025-04-16
Payer: COMMERCIAL

## 2025-04-16 DIAGNOSIS — Z90.89 H/O PARATHYROIDECTOMY: ICD-10-CM

## 2025-04-16 DIAGNOSIS — E29.1 HYPOGONADISM IN MALE: ICD-10-CM

## 2025-04-16 DIAGNOSIS — E89.0 POSTOPERATIVE HYPOTHYROIDISM: ICD-10-CM

## 2025-04-16 DIAGNOSIS — Z98.890 H/O PARATHYROIDECTOMY: ICD-10-CM

## 2025-04-16 LAB
ALBUMIN SERPL-MCNC: 4.4 G/DL (ref 3.5–4.6)
ALP SERPL-CCNC: 70 U/L (ref 35–104)
ALT SERPL-CCNC: 367 U/L (ref 0–41)
ANION GAP SERPL CALCULATED.3IONS-SCNC: 12 MEQ/L (ref 9–15)
AST SERPL-CCNC: 337 U/L (ref 0–40)
BILIRUB SERPL-MCNC: 0.8 MG/DL (ref 0.2–0.7)
BUN SERPL-MCNC: 8 MG/DL (ref 6–20)
CALCIUM SERPL-MCNC: 9.2 MG/DL (ref 8.5–9.9)
CHLORIDE SERPL-SCNC: 98 MEQ/L (ref 95–107)
CO2 SERPL-SCNC: 28 MEQ/L (ref 20–31)
CREAT SERPL-MCNC: 0.73 MG/DL (ref 0.7–1.2)
ERYTHROCYTE [DISTWIDTH] IN BLOOD BY AUTOMATED COUNT: 14.6 % (ref 11.5–14.5)
GLOBULIN SER CALC-MCNC: 3.3 G/DL (ref 2.3–3.5)
GLUCOSE SERPL-MCNC: 88 MG/DL (ref 70–99)
HCT VFR BLD AUTO: 48.2 % (ref 42–52)
HGB BLD-MCNC: 15.8 G/DL (ref 14–18)
MCH RBC QN AUTO: 32.2 PG (ref 27–31.3)
MCHC RBC AUTO-ENTMCNC: 32.8 % (ref 33–37)
MCV RBC AUTO: 98.4 FL (ref 79–92.2)
PLATELET # BLD AUTO: 259 K/UL (ref 130–400)
POTASSIUM SERPL-SCNC: 4.2 MEQ/L (ref 3.4–4.9)
PROT SERPL-MCNC: 7.7 G/DL (ref 6.3–8)
PSA SERPL-MCNC: 1.8 NG/ML (ref 0–4)
PTH-INTACT SERPL-MCNC: 66.9 PG/ML (ref 15–65)
RBC # BLD AUTO: 4.9 M/UL (ref 4.7–6.1)
SODIUM SERPL-SCNC: 138 MEQ/L (ref 135–144)
TSH REFLEX: 3.99 UIU/ML (ref 0.44–3.86)
WBC # BLD AUTO: 5.6 K/UL (ref 4.8–10.8)

## 2025-04-16 PROCEDURE — 83970 ASSAY OF PARATHORMONE: CPT

## 2025-04-16 PROCEDURE — 84443 ASSAY THYROID STIM HORMONE: CPT

## 2025-04-16 PROCEDURE — 86376 MICROSOMAL ANTIBODY EACH: CPT

## 2025-04-16 PROCEDURE — 84403 ASSAY OF TOTAL TESTOSTERONE: CPT

## 2025-04-16 PROCEDURE — 85027 COMPLETE CBC AUTOMATED: CPT

## 2025-04-16 PROCEDURE — 36415 COLL VENOUS BLD VENIPUNCTURE: CPT

## 2025-04-16 PROCEDURE — 84153 ASSAY OF PSA TOTAL: CPT

## 2025-04-16 PROCEDURE — 80053 COMPREHEN METABOLIC PANEL: CPT

## 2025-04-16 PROCEDURE — 84270 ASSAY OF SEX HORMONE GLOBUL: CPT

## 2025-04-17 LAB
SHBG SERPL-SCNC: 21 NMOL/L (ref 10–60)
TESTOST FREE SERPL-MCNC: 129.2 PG/ML (ref 47–244)
TESTOST SERPL-MCNC: 490 NG/DL (ref 249–836)
THYROPEROXIDASE IGG SERPL-ACNC: <4 IU/ML (ref 0–25)

## 2025-05-09 ENCOUNTER — TELEPHONE (OUTPATIENT)
Dept: ORTHOPEDIC SURGERY | Facility: CLINIC | Age: 45
End: 2025-05-09
Payer: COMMERCIAL

## 2025-05-09 DIAGNOSIS — M25.552 PAIN OF LEFT HIP: ICD-10-CM

## 2025-05-09 RX ORDER — MELOXICAM 15 MG/1
15 TABLET ORAL
Qty: 30 TABLET | Refills: 2 | Status: SHIPPED | OUTPATIENT
Start: 2025-05-09

## 2025-05-10 NOTE — PROGRESS NOTES
Former User     Types: Chew   Substance and Sexual Activity    Alcohol use: Yes     Comment: 1 case of beer throughout the week     Drug use: No    Sexual activity: Not Currently     Partners: Female   Lifestyle    Physical activity:     Days per week: Not on file     Minutes per session: Not on file    Stress: Not on file   Relationships    Social connections:     Talks on phone: Not on file     Gets together: Not on file     Attends Hoahaoism service: Not on file     Active member of club or organization: Not on file     Attends meetings of clubs or organizations: Not on file     Relationship status: Not on file    Intimate partner violence:     Fear of current or ex partner: Not on file     Emotionally abused: Not on file     Physically abused: Not on file     Forced sexual activity: Not on file   Other Topics Concern    Not on file   Social History Narrative    Not on file     Current Outpatient Medications on File Prior to Visit   Medication Sig Dispense Refill    aspirin 81 MG EC tablet Take 1 tablet by mouth 2 times daily 60 tablet 0    lactobacillus acidophilus (FLORANEX) Take 1 tablet by mouth 2 times daily 60 tablet 0    ceFAZolin (ANCEF) 2-3 GM-%(50ML) IVPB (duplex) Infuse 50 mLs intravenously every 8 hours 30 each 0     No current facility-administered medications on file prior to visit.       Allergies   Allergen Reactions    Tramadol Rash     Rash on legs       Chief Complaint   Patient presents with    Post-Op Check     RT hip replacement removal due to infection        HPI    Recent hospital admission requiring removal of hardware from previous total hip arthroplasty done by orthopedic in 2018  Patient had recovered well and was doing great in terms of his hip replacement when all of a sudden the hip started to hurt and the incision started to swell, turn red, and became purulent  Suspicion was for an abscess versus seroma patient was scheduled for aspiration but when his pain worsened mood and affect. behavior is normal. Judgment and thought content normal.   Nursing note and vitals reviewed. Assessment:  Marlin Clay was seen today for post-op check. Diagnoses and all orders for this visit:    Pyogenic arthritis of right hip, due to unspecified organism ([address]  -     Amb External Referral To Pain Clinic  -     oxyCODONE (ROXICODONE) 5 MG immediate release tablet; Take 1 tablet by mouth 2 times daily as needed for Pain for up to 30 days. -     diazePAM (VALIUM) 5 MG tablet; Take 1 tablet by mouth nightly for 30 days. -     baclofen (LIORESAL) 10 MG tablet; Take 1 tablet by mouth 3 times daily for 10 days  -     sennosides-docusate sodium (SENOKOT-S) 8.6-50 MG tablet; Take 1 tablet by mouth 2 times daily  Status post total replacement of right hip  -     Amb External Referral To Pain Clinic  -     oxyCODONE (ROXICODONE) 5 MG immediate release tablet; Take 1 tablet by mouth 2 times daily as needed for Pain for up to 30 days. -     diazePAM (VALIUM) 5 MG tablet; Take 1 tablet by mouth nightly for 30 days. -     baclofen (LIORESAL) 10 MG tablet; Take 1 tablet by mouth 3 times daily for 10 days  -     sennosides-docusate sodium (SENOKOT-S) 8.6-50 MG tablet; Take 1 tablet by mouth 2 times daily  -     Handicap Placard MISC; by Does not apply route Expires 2/20/2025, hip replacement. Septic joint of previously replaced joint with hardware  Agreed to continuing pain medications for only up to a month, if he will require long-term pain medication I advised he will need to see pain management, referral placed  Continue follow-up with Ortho  Continue follow-up with infectious disease    Serum potassium elevated  -     Basic Metabolic Panel;  Future  Likely a falsely elevated level  Repeat labs      Orders Placed This Encounter   Procedures    Basic Metabolic Panel     Standing Status:   Future     Number of Occurrences:   1     Standing Expiration Date:   5/20/2020    Amb External Referral To Pain cranial nerves 2-12 intact/central and peripheral vision intact/extra-ocular movements intact/tongue is midline

## 2025-07-25 ENCOUNTER — OFFICE VISIT (OUTPATIENT)
Dept: ENDOCRINOLOGY | Age: 45
End: 2025-07-25
Payer: COMMERCIAL

## 2025-07-25 VITALS
DIASTOLIC BLOOD PRESSURE: 82 MMHG | SYSTOLIC BLOOD PRESSURE: 138 MMHG | HEIGHT: 74 IN | HEART RATE: 75 BPM | OXYGEN SATURATION: 97 % | BODY MASS INDEX: 29.9 KG/M2 | WEIGHT: 233 LBS

## 2025-07-25 DIAGNOSIS — E29.1 HYPOGONADISM IN MALE: ICD-10-CM

## 2025-07-25 DIAGNOSIS — E89.0 POSTOPERATIVE HYPOTHYROIDISM: Primary | ICD-10-CM

## 2025-07-25 PROCEDURE — 99214 OFFICE O/P EST MOD 30 MIN: CPT | Performed by: PHYSICIAN ASSISTANT

## 2025-07-25 RX ORDER — LEVOTHYROXINE SODIUM 100 UG/1
100 TABLET ORAL DAILY
Qty: 90 TABLET | Refills: 1 | Status: CANCELLED | OUTPATIENT
Start: 2025-07-25

## 2025-07-25 RX ORDER — LEVOTHYROXINE SODIUM 100 UG/1
100 TABLET ORAL DAILY
Qty: 90 TABLET | Refills: 1 | Status: SHIPPED | OUTPATIENT
Start: 2025-07-25

## 2025-07-25 ASSESSMENT — ENCOUNTER SYMPTOMS
VOMITING: 0
NAUSEA: 0
RHINORRHEA: 0
ABDOMINAL PAIN: 0
WHEEZING: 0
SINUS PRESSURE: 0
COUGH: 0
SHORTNESS OF BREATH: 0
DIARRHEA: 0
SORE THROAT: 0

## 2025-07-25 NOTE — PROGRESS NOTES
coordination of care.  I have reviewed the patient's medical history in detail and updated the computerized patient record.

## 2025-07-26 ENCOUNTER — HOSPITAL ENCOUNTER (OUTPATIENT)
Dept: LAB | Age: 45
Discharge: HOME | End: 2025-07-26
Payer: COMMERCIAL

## 2025-07-26 DIAGNOSIS — E89.0 POSTOPERATIVE HYPOTHYROIDISM: ICD-10-CM

## 2025-07-26 DIAGNOSIS — E29.1 HYPOGONADISM IN MALE: ICD-10-CM

## 2025-07-26 LAB
ALBUMIN SERPL-MCNC: 4.4 G/DL (ref 3.5–4.6)
ALP SERPL-CCNC: 60 U/L (ref 35–104)
ALT SERPL-CCNC: 81 U/L (ref 0–41)
ANION GAP SERPL CALCULATED.3IONS-SCNC: 11 MEQ/L (ref 9–15)
AST SERPL-CCNC: 89 U/L (ref 0–40)
BILIRUB SERPL-MCNC: 0.4 MG/DL (ref 0.2–0.7)
BUN SERPL-MCNC: 6 MG/DL (ref 6–20)
CALCIUM SERPL-MCNC: 9.1 MG/DL (ref 8.5–9.9)
CHLORIDE SERPL-SCNC: 108 MEQ/L (ref 95–107)
CO2 SERPL-SCNC: 24 MEQ/L (ref 20–31)
CREAT SERPL-MCNC: 0.69 MG/DL (ref 0.7–1.2)
ERYTHROCYTE [DISTWIDTH] IN BLOOD BY AUTOMATED COUNT: 13.8 % (ref 11.5–14.5)
GLOBULIN SER CALC-MCNC: 3.1 G/DL (ref 2.3–3.5)
GLUCOSE SERPL-MCNC: 78 MG/DL (ref 70–99)
HCT VFR BLD AUTO: 48.4 % (ref 42–52)
HGB BLD-MCNC: 15.8 G/DL (ref 14–18)
MCH RBC QN AUTO: 33.8 PG (ref 27–31.3)
MCHC RBC AUTO-ENTMCNC: 32.6 % (ref 33–37)
MCV RBC AUTO: 103.6 FL (ref 79–92.2)
PLATELET # BLD AUTO: 268 K/UL (ref 130–400)
POTASSIUM SERPL-SCNC: 4.3 MEQ/L (ref 3.4–4.9)
PROT SERPL-MCNC: 7.5 G/DL (ref 6.3–8)
RBC # BLD AUTO: 4.67 M/UL (ref 4.7–6.1)
SODIUM SERPL-SCNC: 143 MEQ/L (ref 135–144)
T4 FREE SERPL-MCNC: 1.11 NG/DL (ref 0.84–1.68)
TSH REFLEX: 2.46 UIU/ML (ref 0.44–3.86)
WBC # BLD AUTO: 4.9 K/UL (ref 4.8–10.8)

## 2025-07-26 PROCEDURE — 80053 COMPREHEN METABOLIC PANEL: CPT

## 2025-07-26 PROCEDURE — 84270 ASSAY OF SEX HORMONE GLOBUL: CPT

## 2025-07-26 PROCEDURE — 84439 ASSAY OF FREE THYROXINE: CPT

## 2025-07-26 PROCEDURE — 84443 ASSAY THYROID STIM HORMONE: CPT

## 2025-07-26 PROCEDURE — 84403 ASSAY OF TOTAL TESTOSTERONE: CPT

## 2025-07-26 PROCEDURE — 85027 COMPLETE CBC AUTOMATED: CPT

## 2025-07-26 PROCEDURE — 36415 COLL VENOUS BLD VENIPUNCTURE: CPT

## 2025-07-27 LAB
SHBG SERPL-SCNC: 21 NMOL/L (ref 17–56)
TESTOST FREE SERPL-MCNC: 86.4 PG/ML (ref 47–244)
TESTOST SERPL-MCNC: 342 NG/DL (ref 249–836)

## 2025-08-14 DIAGNOSIS — K21.9 GASTROESOPHAGEAL REFLUX DISEASE WITHOUT ESOPHAGITIS: ICD-10-CM

## 2025-08-15 RX ORDER — PANTOPRAZOLE SODIUM 20 MG/1
20 TABLET, DELAYED RELEASE ORAL
Qty: 90 TABLET | Refills: 3 | Status: SHIPPED | OUTPATIENT
Start: 2025-08-15

## (undated) DEVICE — CHLORAPREP 26ML ORANGE

## (undated) DEVICE — SPONGE DRN W4XL4IN RAYON/POLYESTER 6 PLY NONWOVEN PRECUT

## (undated) DEVICE — SUTURE RETRIEVER

## (undated) DEVICE — DRAPE EQUIP TRNSPRT CONTAINMENT FOR BK TAB

## (undated) DEVICE — 4-PORT MANIFOLD: Brand: NEPTUNE 2

## (undated) DEVICE — SYRINGE MED 50ML LUERLOCK TIP

## (undated) DEVICE — SUTURE MCRYL SZ 4-0 L27IN ABSRB UD L19MM PS-2 1/2 CIR PRIM Y426H

## (undated) DEVICE — Z DISCONTINUED PER MEDLINE USE 2741944 DRESSING AQUACEL 12 IN SURG W9XL30CM SIL CVR WTRPRF VIR BACT BARR ANTIMIC

## (undated) DEVICE — BW-412T DISP COMBO CLEANING BRUSH: Brand: SINGLE USE COMBINATION CLEANING BRUSH

## (undated) DEVICE — SUTURE MCRYL + SZ 3-0 L36IN ABSRB UD CT-1 L36MM 1/2 CIR MCP944H

## (undated) DEVICE — ENDOSCOPIC TRAY TRNSPRT 20.5X16.5X4.1 IN RECYCL SUGAR PULP

## (undated) DEVICE — GLOVE ORANGE PI 8   MSG9080

## (undated) DEVICE — TUBE ENDOSCP COLON CHANNEL

## (undated) DEVICE — LABEL MED MINI W/ MARKER

## (undated) DEVICE — Device: Brand: STABLECUT®

## (undated) DEVICE — DRAPE,HIP,W/POUCHES,STERILE: Brand: MEDLINE

## (undated) DEVICE — TOTAL TRAY, DB, 100% SILI FOLEY, 16FR 10: Brand: MEDLINE

## (undated) DEVICE — TIBURON TOP SHEET: Brand: CONVERTORS

## (undated) DEVICE — SUTURE VCRL + SZ 2-0 L36IN ABSRB UD L36MM CT-1 1/2 CIR VCP945H

## (undated) DEVICE — KIT EVAC 400CC PVC RADPQ Y CONN

## (undated) DEVICE — HOOD: Brand: FLYTE, SURGICOOL

## (undated) DEVICE — TIBURON SPLIT SHEET: Brand: CONVERTORS

## (undated) DEVICE — GLOVE ORANGE PI 7   MSG9070

## (undated) DEVICE — Device

## (undated) DEVICE — CEMENT MIXING SYSTEM WITH FEMORAL BREAKWAY NOZZLE: Brand: REVOLUTION

## (undated) DEVICE — ADAPTER FLSH PMP FLD MGMT GI IRRIG OFP 2 DISPOSABLE

## (undated) DEVICE — SHEET, DRAPE, SPLIT, STERILE: Brand: MEDLINE

## (undated) DEVICE — KIT EVAC 400CC DIA3/16IN H PAT 12.5IN 3 SPR RND SHP PVC DRN

## (undated) DEVICE — USAGE FEE F/OSTEOTOME

## (undated) DEVICE — NEEDLE SPNL L3 1/2IN OD18GA DMND PT PLAS GRN HUB SENSTCH

## (undated) DEVICE — PREVENA PLUS 125 THERAPY UNITPREVENA PLUS 125 THERAPY UNIT WITH PREVENA PLUS 150ML CANISTER: Brand: PREVENA PLUS™

## (undated) DEVICE — SUTURE TICRN BR BL NDL C-20 SZ 5 30 8886302779

## (undated) DEVICE — SIPS DUAL 2 MINUTE TIP

## (undated) DEVICE — PACK PROCEDURE SURG TOT HIP DRP

## (undated) DEVICE — STAPLER SKIN H3.9MM WIRE DIA0.58MM CRWN 6.9MM 35 STPL FIX

## (undated) DEVICE — PILLOW ABD SM W12XH6XL18IN LEG FOAM NYLEX CVR W/ STRP DISP

## (undated) DEVICE — MEDI-VAC NON-CONDUCTIVE SUCTION TUBING: Brand: CARDINAL HEALTH

## (undated) DEVICE — GLOVE ORANGE PI 7 1/2   MSG9075

## (undated) DEVICE — SUTURE VCRL SZ 1 L36IN ABSRB UD L36MM CT-1 1/2 CIR J947H

## (undated) DEVICE — PENCIL SMOKE EVAC PUSH BUTTON COATED

## (undated) DEVICE — TUBING IRRIGATION 140/160/180/190 SER GI ENDOSCP SMARTCAP

## (undated) DEVICE — SUTURE ETHBND EXCEL SZ 2 L30IN NONABSORBABLE GRN L40MM V-37 MX69G

## (undated) DEVICE — ELECTRODE PT RET AD L9FT HI MOIST COND ADH HYDRGEL CORDED

## (undated) DEVICE — FEMORAL CANAL BRUSH, IRRIGATION/SUCTION

## (undated) DEVICE — BLADE SAW W6.3XL60MM THK0.64MM CUT THK1.04MM REPL SHT RECIP

## (undated) DEVICE — 3M™ STERI-DRAPE™ U-DRAPE 1015: Brand: STERI-DRAPE™

## (undated) DEVICE — SUTURE ABSORBABLE BRAIDED 0 CT-1 8X27 IN UD VICRYL JJ41G

## (undated) DEVICE — DRAIN SURG 10FR 100% SIL RND END PERF W/ TRCR

## (undated) DEVICE — T4 HOOD

## (undated) DEVICE — DRESSING NEG PRSS 13CM PREVENA

## (undated) DEVICE — ENDO CARRY-ON PROCEDURE KIT INCLUDES LUBRICANT, DEFENDO OLYMPUS AIR, WATER, SUCTION, BIOPSY VALVE KIT, ENZYMATIC SPONGE, AND BASIN.: Brand: ENDO CARRY-ON PROCEDURE KIT

## (undated) DEVICE — PILLOW POS W15XH6XL22IN RASPBERRY FOAM ABD W/ STRP DISP FOR

## (undated) DEVICE — FORCEPS BX L240CM JAW DIA2.4MM ORNG L CAP W/ NDL DISP RAD

## (undated) DEVICE — 3M™ STERI-STRIP™ REINFORCED ADHESIVE SKIN CLOSURES, R1547, 1/2 IN X 4 IN (12 MM X 100 MM), 6 STRIPS/ENVELOPE: Brand: 3M™ STERI-STRIP™

## (undated) DEVICE — Z DISCONTINUED PER MEDLINE USE 2741943 DRESSING AQUACEL 10 IN ALG W9XL25CM SIL CVR WTRPRF VIR BACT BARR ANTIMIC

## (undated) DEVICE — DRESSING NEG PRSS 20CM PREVENA

## (undated) DEVICE — Z DISCONTINUED PER MEDLINE USE 2741942 DRESSING AQUACEL 6 IN ALG W9XL15CM SIL CVR WTRPRF VIR BACT BARR ANTIMIC